# Patient Record
Sex: FEMALE | Race: WHITE | NOT HISPANIC OR LATINO | Employment: PART TIME | URBAN - METROPOLITAN AREA
[De-identification: names, ages, dates, MRNs, and addresses within clinical notes are randomized per-mention and may not be internally consistent; named-entity substitution may affect disease eponyms.]

---

## 2017-04-03 ENCOUNTER — HOSPITAL ENCOUNTER (OUTPATIENT)
Dept: MAMMOGRAPHY | Facility: CLINIC | Age: 62
Discharge: HOME | End: 2017-04-03
Attending: OBSTETRICS & GYNECOLOGY | Admitting: OBSTETRICS & GYNECOLOGY
Payer: COMMERCIAL

## 2017-04-03 DIAGNOSIS — Z12.31 VISIT FOR SCREENING MAMMOGRAM: ICD-10-CM

## 2017-04-03 PROCEDURE — G0202 SCR MAMMO BI INCL CAD: HCPCS

## 2017-05-02 DIAGNOSIS — I10 ESSENTIAL HYPERTENSION: ICD-10-CM

## 2017-05-02 DIAGNOSIS — E03.9 HYPOTHYROIDISM, UNSPECIFIED TYPE: ICD-10-CM

## 2017-05-02 RX ORDER — ATENOLOL 100 MG/1
TABLET ORAL
Qty: 30 TABLET | Refills: 0 | Status: SHIPPED | OUTPATIENT
Start: 2017-05-02 | End: 2017-05-27

## 2017-05-02 RX ORDER — LEVOTHYROXINE SODIUM 75 UG/1
TABLET ORAL
Qty: 30 TABLET | Refills: 0 | Status: SHIPPED | OUTPATIENT
Start: 2017-05-02 | End: 2017-07-06

## 2017-05-02 NOTE — TELEPHONE ENCOUNTER
atenolol (TENORMIN) 100 MG tablet    Last Written Prescription Date: 4/11/16  Last Fill Quantity: 30, # refills: 0    Last Office Visit with [facility], [facility] or [facility] prescribing provider:  6/9/16 with Dr. Boyer  Future Office Visit:    DUE for labs and OV.    BP Readings from Last 3 Encounters:   06/22/16 168/80   06/09/16 138/74   12/03/15 158/78       Levothyroxine   Last Written Prescription Date: 6/9/16  Last Office Visit with [facility] or [facility] prescribing provider:  6/9/16 with Dr. Boyer        TSH   Date Value Ref Range Status   10/21/2016 5.27 (H) 0.40 - 4.00 mU/L Final

## 2017-05-27 ENCOUNTER — MYC REFILL (OUTPATIENT)
Dept: INTERNAL MEDICINE | Facility: CLINIC | Age: 62
End: 2017-05-27

## 2017-05-27 DIAGNOSIS — M10.9 GOUT, UNSPECIFIED: ICD-10-CM

## 2017-05-27 DIAGNOSIS — I10 ESSENTIAL HYPERTENSION: ICD-10-CM

## 2017-05-27 DIAGNOSIS — E78.5 HYPERLIPIDEMIA LDL GOAL <100: ICD-10-CM

## 2017-05-30 RX ORDER — LOVASTATIN 20 MG
20 TABLET ORAL EVERY EVENING
Qty: 30 TABLET | Refills: 0 | Status: SHIPPED | OUTPATIENT
Start: 2017-05-30 | End: 2017-07-06

## 2017-05-30 RX ORDER — ATENOLOL 100 MG/1
100 TABLET ORAL DAILY
Qty: 30 TABLET | Refills: 0 | Status: SHIPPED | OUTPATIENT
Start: 2017-05-30 | End: 2017-07-06

## 2017-05-30 RX ORDER — HYDROCHLOROTHIAZIDE 25 MG/1
25 TABLET ORAL EVERY MORNING
Qty: 30 TABLET | Refills: 0 | Status: SHIPPED | OUTPATIENT
Start: 2017-05-30 | End: 2017-07-06

## 2017-05-30 RX ORDER — LOSARTAN POTASSIUM 100 MG/1
50 TABLET ORAL 2 TIMES DAILY
Qty: 30 TABLET | Refills: 0 | Status: SHIPPED | OUTPATIENT
Start: 2017-05-30 | End: 2017-07-06

## 2017-05-30 RX ORDER — ALLOPURINOL 300 MG/1
300 TABLET ORAL DAILY
Qty: 30 TABLET | Refills: 0 | Status: SHIPPED | OUTPATIENT
Start: 2017-05-30 | End: 2017-07-06

## 2017-05-30 NOTE — TELEPHONE ENCOUNTER
Medication is being filled for 1 time refill only due to:  Due for fasting labs and office visit

## 2017-05-30 NOTE — TELEPHONE ENCOUNTER
Message from MyChart:  Original authorizing provider: MD Jenna Garzon would like a refill of the following medications:  allopurinol (ZYLOPRIM) 300 MG tablet [Wilmar Boyer MD]  lovastatin (MEVACOR) 20 MG tablet [Wilmar Boyer MD]  hydrochlorothiazide (HYDRODIURIL) 25 MG tablet [Wilmar Boyer MD]  losartan (COZAAR) 100 MG tablet [Wilmar Boyer MD]  atenolol (TENORMIN) 100 MG tablet [Wilmar Boyer MD]    Preferred pharmacy: [id number] [id number] [address]    Comment:

## 2017-06-03 DIAGNOSIS — I10 ESSENTIAL HYPERTENSION: ICD-10-CM

## 2017-06-03 DIAGNOSIS — M10.9 GOUT, UNSPECIFIED: ICD-10-CM

## 2017-06-03 DIAGNOSIS — E78.5 HYPERLIPIDEMIA LDL GOAL <100: ICD-10-CM

## 2017-06-05 RX ORDER — ALLOPURINOL 300 MG/1
TABLET ORAL
Qty: 30 TABLET | Refills: 0 | OUTPATIENT
Start: 2017-06-05

## 2017-06-05 RX ORDER — LOSARTAN POTASSIUM 100 MG/1
TABLET ORAL
Qty: 30 TABLET | Refills: 10 | OUTPATIENT
Start: 2017-06-05

## 2017-06-05 RX ORDER — HYDROCHLOROTHIAZIDE 25 MG/1
TABLET ORAL
Qty: 30 TABLET | Refills: 10 | OUTPATIENT
Start: 2017-06-05

## 2017-06-05 RX ORDER — LOVASTATIN 20 MG
TABLET ORAL
Qty: 30 TABLET | Refills: 10 | OUTPATIENT
Start: 2017-06-05

## 2017-06-10 ENCOUNTER — HEALTH MAINTENANCE LETTER (OUTPATIENT)
Age: 62
End: 2017-06-10

## 2017-06-30 DIAGNOSIS — E11.9 TYPE 2 DIABETES MELLITUS WITHOUT COMPLICATION, WITHOUT LONG-TERM CURRENT USE OF INSULIN (H): Primary | ICD-10-CM

## 2017-06-30 NOTE — TELEPHONE ENCOUNTER
metFORMIN (GLUCOPHAGE) 1000 MG tablet         Last Written Prescription Date: 6/09/2016  Last Fill Quantity: 60, # refills: 11  Last Office Visit with [facility], [facility] or  [facility] prescribing provider:  6/22/2016   Next 5 appointments (look out 90 days)     Jul 06, 2017  9:30 AM CDT   PHYSICAL with Wilmar Boyer MD   [facility]    [address] [place] [id number] [phone]                   BP Readings from Last 3 Encounters:   06/22/16 168/80   06/09/16 138/74   12/03/15 158/78     Lab Results   Component Value Date    MICROL 33 06/08/2016     Lab Results   Component Value Date    UMALCR 22.57 06/08/2016     Creatinine   Date Value Ref Range Status   06/08/2016 0.77 0.52 - 1.04 mg/dL Final   ]  GFR Estimate   Date Value Ref Range Status   06/08/2016 75 >60 mL/min/1.7m2 Final     Comment:     Non  GFR Calc   05/01/2015 86 >60 mL/min/1.7m2 Final     Comment:     Non  GFR Calc   09/30/2014 79 >60 mL/min/1.7m2 Final     Comment:     Non  GFR Calc     GFR Estimate If Black   Date Value Ref Range Status   06/08/2016 >90   GFR Calc   >60 mL/min/1.7m2 Final   05/01/2015 >90   GFR Calc   >60 mL/min/1.7m2 Final   09/30/2014 >90   GFR Calc   >60 mL/min/1.7m2 Final     Lab Results   Component Value Date    CHOL 172 06/08/2016     Lab Results   Component Value Date    HDL 37 06/08/2016     Lab Results   Component Value Date    LDL 92 06/08/2016     Lab Results   Component Value Date    TRIG 214 06/08/2016     Lab Results   Component Value Date    CHOLHDLRATIO 4.6 05/01/2015     Lab Results   Component Value Date    AST 66 03/21/2016     Lab Results   Component Value Date    ALT 67 03/21/2016     Lab Results   Component Value Date    A1C 6.5 10/21/2016    A1C 6.9 03/21/2016    A1C 7.5 05/01/2015    A1C 7.4 09/30/2014    A1C 7.8 04/24/2014     Potassium   Date  Value Ref Range Status   06/08/2016 4.8 3.4 - 5.3 mmol/L Final

## 2017-06-30 NOTE — TELEPHONE ENCOUNTER
Routing refill request to provider for review/approval because:  Labs not current:  LIPIDS, micro albumin, BMP, AST/ALT  Patient needs to be seen because it has been more than 1 year since last office visit.  BP out of range per [facility] protocol

## 2017-07-06 ENCOUNTER — OFFICE VISIT (OUTPATIENT)
Dept: INTERNAL MEDICINE | Facility: CLINIC | Age: 62
End: 2017-07-06
Payer: COMMERCIAL

## 2017-07-06 VITALS
DIASTOLIC BLOOD PRESSURE: 80 MMHG | TEMPERATURE: 98 F | BODY MASS INDEX: 29.4 KG/M2 | SYSTOLIC BLOOD PRESSURE: 136 MMHG | HEART RATE: 55 BPM | HEIGHT: 68 IN | WEIGHT: 194 LBS | OXYGEN SATURATION: 98 %

## 2017-07-06 DIAGNOSIS — Z00.01 ENCOUNTER FOR ROUTINE ADULT MEDICAL EXAM WITH ABNORMAL FINDINGS: Primary | ICD-10-CM

## 2017-07-06 DIAGNOSIS — E03.9 HYPOTHYROIDISM, UNSPECIFIED TYPE: ICD-10-CM

## 2017-07-06 DIAGNOSIS — E78.5 HYPERLIPIDEMIA LDL GOAL <100: ICD-10-CM

## 2017-07-06 DIAGNOSIS — E11.9 TYPE 2 DIABETES MELLITUS WITHOUT COMPLICATION, WITHOUT LONG-TERM CURRENT USE OF INSULIN (H): ICD-10-CM

## 2017-07-06 DIAGNOSIS — Z23 NEED FOR TDAP VACCINATION: ICD-10-CM

## 2017-07-06 DIAGNOSIS — I10 ESSENTIAL HYPERTENSION: ICD-10-CM

## 2017-07-06 DIAGNOSIS — M10.9 GOUT, UNSPECIFIED: ICD-10-CM

## 2017-07-06 LAB
ALBUMIN SERPL-MCNC: 3.7 G/DL (ref 3.4–5)
ALP SERPL-CCNC: 61 U/L (ref 40–150)
ALT SERPL W P-5'-P-CCNC: 48 U/L (ref 0–50)
ANION GAP SERPL CALCULATED.3IONS-SCNC: 7 MMOL/L (ref 3–14)
AST SERPL W P-5'-P-CCNC: 52 U/L (ref 0–45)
BILIRUB SERPL-MCNC: 0.4 MG/DL (ref 0.2–1.3)
BUN SERPL-MCNC: 17 MG/DL (ref 7–30)
CALCIUM SERPL-MCNC: 8.9 MG/DL (ref 8.5–10.1)
CHLORIDE SERPL-SCNC: 105 MMOL/L (ref 94–109)
CHOLEST SERPL-MCNC: 172 MG/DL
CO2 SERPL-SCNC: 27 MMOL/L (ref 20–32)
CREAT SERPL-MCNC: 0.79 MG/DL (ref 0.52–1.04)
CREAT UR-MCNC: 83 MG/DL
GFR SERPL CREATININE-BSD FRML MDRD: 73 ML/MIN/1.7M2
GLUCOSE SERPL-MCNC: 118 MG/DL (ref 70–99)
HBA1C MFR BLD: 6.5 % (ref 4.3–6)
HDLC SERPL-MCNC: 41 MG/DL
LDLC SERPL CALC-MCNC: 85 MG/DL
MICROALBUMIN UR-MCNC: 22 MG/L
MICROALBUMIN/CREAT UR: 25.96 MG/G CR (ref 0–25)
NONHDLC SERPL-MCNC: 131 MG/DL
POTASSIUM SERPL-SCNC: 4.5 MMOL/L (ref 3.4–5.3)
PROT SERPL-MCNC: 7.4 G/DL (ref 6.8–8.8)
SODIUM SERPL-SCNC: 139 MMOL/L (ref 133–144)
T4 FREE SERPL-MCNC: 1.14 NG/DL (ref 0.76–1.46)
TRIGL SERPL-MCNC: 230 MG/DL
TSH SERPL DL<=0.005 MIU/L-ACNC: 4.39 MU/L (ref 0.4–4)

## 2017-07-06 PROCEDURE — 99396 PREV VISIT EST AGE 40-64: CPT | Mod: 25 | Performed by: INTERNAL MEDICINE

## 2017-07-06 PROCEDURE — 90715 TDAP VACCINE 7 YRS/> IM: CPT | Performed by: INTERNAL MEDICINE

## 2017-07-06 PROCEDURE — 90471 IMMUNIZATION ADMIN: CPT | Performed by: INTERNAL MEDICINE

## 2017-07-06 PROCEDURE — 84439 ASSAY OF FREE THYROXINE: CPT | Performed by: INTERNAL MEDICINE

## 2017-07-06 PROCEDURE — 84443 ASSAY THYROID STIM HORMONE: CPT | Performed by: INTERNAL MEDICINE

## 2017-07-06 PROCEDURE — 80061 LIPID PANEL: CPT | Performed by: INTERNAL MEDICINE

## 2017-07-06 PROCEDURE — 83036 HEMOGLOBIN GLYCOSYLATED A1C: CPT | Performed by: INTERNAL MEDICINE

## 2017-07-06 PROCEDURE — 80053 COMPREHEN METABOLIC PANEL: CPT | Performed by: INTERNAL MEDICINE

## 2017-07-06 PROCEDURE — 82043 UR ALBUMIN QUANTITATIVE: CPT | Performed by: INTERNAL MEDICINE

## 2017-07-06 PROCEDURE — 36415 COLL VENOUS BLD VENIPUNCTURE: CPT | Performed by: INTERNAL MEDICINE

## 2017-07-06 RX ORDER — ALBUTEROL SULFATE 90 UG/1
2 AEROSOL, METERED RESPIRATORY (INHALATION) EVERY 6 HOURS PRN
Qty: 1 INHALER | Refills: 0 | Status: CANCELLED | OUTPATIENT
Start: 2017-07-06

## 2017-07-06 RX ORDER — ATENOLOL 100 MG/1
100 TABLET ORAL DAILY
Qty: 30 TABLET | Refills: 11 | Status: SHIPPED | OUTPATIENT
Start: 2017-07-06 | End: 2018-07-16

## 2017-07-06 RX ORDER — LOSARTAN POTASSIUM 100 MG/1
50 TABLET ORAL 2 TIMES DAILY
Qty: 30 TABLET | Refills: 11 | Status: SHIPPED | OUTPATIENT
Start: 2017-07-06 | End: 2018-06-30

## 2017-07-06 RX ORDER — LEVOTHYROXINE SODIUM 75 UG/1
TABLET ORAL
Qty: 30 TABLET | Refills: 0
Start: 2017-07-06 | End: 2017-10-04 | Stop reason: DRUGHIGH

## 2017-07-06 RX ORDER — LOVASTATIN 20 MG
20 TABLET ORAL EVERY EVENING
Qty: 30 TABLET | Refills: 11 | Status: SHIPPED | OUTPATIENT
Start: 2017-07-06 | End: 2018-07-03

## 2017-07-06 RX ORDER — HYDROCHLOROTHIAZIDE 25 MG/1
25 TABLET ORAL EVERY MORNING
Qty: 30 TABLET | Refills: 11 | Status: SHIPPED | OUTPATIENT
Start: 2017-07-06 | End: 2018-06-30

## 2017-07-06 RX ORDER — ALLOPURINOL 300 MG/1
300 TABLET ORAL DAILY
Qty: 30 TABLET | Refills: 11 | Status: SHIPPED | OUTPATIENT
Start: 2017-07-06 | End: 2018-06-30

## 2017-07-06 NOTE — Clinical Note
Please abstract: Sees Dr Mcnulty ([facility]) with exam  April 2017. Last pap  done April 2016 and normal per pt

## 2017-07-06 NOTE — NURSING NOTE
"Chief Complaint   Patient presents with     Physical       Initial /80  Pulse 55  Temp 98  F (36.7  C) (Oral)  Ht 5' 7.5\" (1.715 m)  Wt 194 lb (88 kg)  SpO2 98%  BMI 29.94 kg/m2 Estimated body mass index is 29.94 kg/(m^2) as calculated from the following:    Height as of this encounter: 5' 7.5\" (1.715 m).    Weight as of this encounter: 194 lb (88 kg).  Medication Reconciliation: complete  "

## 2017-07-06 NOTE — PROGRESS NOTES
SUBJECTIVE:   CC: Jenna Card is an 61 year old woman who presents for preventive health visit.     Healthy Habits:  Answers for HPI/ROS submitted by the patient on 7/6/2017   Annual Exam:  Getting at least 3 servings of Calcium per day:: Yes  Bi-annual eye exam:: Yes  Dental care twice a year:: Yes  Sleep apnea or symptoms of sleep apnea:: Excessive snoring  Diet:: Diabetic  Frequency of exercise:: 2-3 days/week  Taking medications regularly:: Yes  Medication side effects:: None  Additional concerns today:: No  PHQ-2 Score: 0  Duration of exercise:: 15-30 minutes    Sees Dr Mcnulty ([facility]) with exam  April 2017. Last pap  Done April 2016 and normal per pt  Eye exam Dec 2016        Today's PHQ-2 Score:   PHQ-2 ( 1999 [employer]) 7/6/2017 7/6/2017   Q1: Little interest or pleasure in doing things 0 0   Q2: Feeling down, depressed or hopeless 0 0   PHQ-2 Score 0 0   Q1: Little interest or pleasure in doing things Not at all -   Q2: Feeling down, depressed or hopeless Not at all -   PHQ-2 Score 0 -       Abuse: Current or Past(Physical, Sexual or Emotional)- No  Do you feel safe in your environment - Yes    Social History   Substance Use Topics     Smoking status: Never Smoker     Smokeless tobacco: Not on file     Alcohol use Yes      Comment: 1 drink weekly     The patient does not drink >3 drinks per day nor >7 drinks per week.    Reviewed orders with patient.  Reviewed health maintenance and updated orders accordingly - Yes  Labs reviewed in [facility]    Patient over age 50, mutual decision to screen reflected in health maintenance.    Pertinent mammograms are reviewed under the imaging tab.  History of abnormal Pap smear: NO - age 30-65 PAP every 5 years with negative HPV co-testing recommended    Reviewed and updated as needed this visit by clinical staff  Tobacco  Allergies         Reviewed and updated as needed this visit by Provider            ROS:  C: NEGATIVE for fever, chills, change in weight  I:  "NEGATIVE for worrisome rashes, moles or lesions  E: NEGATIVE for vision changes or irritation. Eye exam Dec 2016.   ENT: NEGATIVE for ear, mouth and throat problems  R: NEGATIVE for significant cough or SOB  B: NEGATIVE for masses, tenderness or discharge  CV: NEGATIVE for chest pain, palpitations or peripheral edema  GI: NEGATIVE for nausea, abdominal pain, heartburn, or change in bowel habits  : NEGATIVE for unusual urinary or vaginal symptoms. No vaginal bleeding.  M: NEGATIVE for significant arthralgias or myalgia that limit activity. Hx osteoarthritis   N: NEGATIVE for weakness, dizziness or paresthesias except for rare CTS sx right side in AM  P: NEGATIVE for changes in mood or affect   E: Hx diabetes mellitus and thyroid treatment. Due for labs      Sugars AM and bedtime with Metformin 500mg BID:  103,129  104,107  89,99  95,102  100,101  105  107    Had previous neg Hep C screening with [facility] in Aug 2014 per pt.  your    OBJECTIVE:   /80  Pulse 55  Temp 98  F (36.7  C) (Oral)  Ht 5' 7.5\" (1.715 m)  Wt 194 lb (88 kg)  SpO2 98%  BMI 29.94 kg/m2  EXAM:  General appearance - healthy, alert, no distress  Skin - No rashes or lesions.  Head - normocephalic, atraumatic  Eyes - GIRMA, EOMI, fundi exam with nondilated pupils negative.  Ears - External ears normal. Canals clear. TM's normal.  Nose/Sinuses - Nares normal. Septum midline. Mucosa normal. No drainage or sinus tenderness.  Oropharynx - No erythema, no adenopathy, no exudates.  Neck - Supple without adenopathy or thyromegaly. No bruits.  Lungs - Clear to auscultation without wheezes/rhonchi.  Heart - Regular rate and rhythm without murmurs, clicks, or gallops.  Nodes - No supraclavicular, axillary, or inguinal adenopathy palpable.  Breasts - deferred  Abdomen - Abdomen soft, non-tender. BS normal. No masses or hepatosplenomegaly palpable. No bruits.  Extremities -No cyanosis, clubbing or edema.    Musculoskeletal - Spine ROM normal. Muscular " strength intact.   Peripheral pulses - radial=4/4, femoral=4/4, posterior tibial=4/4, dorsalis pedis=4/4,  Neuro - Gait normal. Reflexes normal and symmetric. Sensation grossly WNL.  Genital/Rectal - deferred      ASSESSMENT/PLAN:   1. Encounter for routine adult medical exam with abnormal findings    Pap, mammogram through GYN. Needs TDAP vaccine. Will do pneumovax at next appt    2. Type 2 diabetes mellitus without complication, without long-term current use of insulin (H)  Blood sugars at goal. Labs as ordered. Continue mds  - blood glucose monitoring (ACCU-CHEK MARSHALL PLUS) test strip; Check blood sugars once daily  Dispense: 1 Box; Refill: 11  - metFORMIN (GLUCOPHAGE) 500 MG tablet; Take 1 tablet (500 mg) by mouth 2 times daily (with meals)  Dispense: 180 tablet; Refill: 3  - Hemoglobin A1c  - Comprehensive metabolic panel  - Lipid panel reflex to direct LDL  - Microalbumin quantitative random urine  - TSH with free T4 reflex    3. Gout, unspecified  Controlled. Continue medications  - allopurinol (ZYLOPRIM) 300 MG tablet; Take 1 tablet (300 mg) by mouth daily  Dispense: 30 tablet; Refill: 11    4. Hyperlipidemia LDL goal <100  Controlled. Continue medications  - lovastatin (MEVACOR) 20 MG tablet; Take 1 tablet (20 mg) by mouth every evening  Dispense: 30 tablet; Refill: 11  - Comprehensive metabolic panel  - Lipid panel reflex to direct LDL    5. Essential hypertension  Controlled. Continue medications  - hydrochlorothiazide (HYDRODIURIL) 25 MG tablet; Take 1 tablet (25 mg) by mouth every morning  Dispense: 30 tablet; Refill: 11  - losartan (COZAAR) 100 MG tablet; Take 0.5 tablets (50 mg) by mouth 2 times daily  Dispense: 30 tablet; Refill: 11  - atenolol (TENORMIN) 100 MG tablet; Take 1 tablet (100 mg) by mouth daily TAKE 1 TABLET (100 MG) BY MOUTH EVERY EVENING  Dispense: 30 tablet; Refill: 11    6. Hypothyroidism, unspecified type   Lab as ordered to see if room to adjust dose  - levothyroxine  "(SYNTHROID/LEVOTHROID) 75 MCG tablet; 1/2 tab daily  Dispense: 30 tablet; Refill: 0    7. Need for Tdap vaccination  - TDAP VACCINE (ADACEL)      COUNSELING:   Reviewed preventive health counseling, as reflected in patient instructions         reports that she has never smoked. She does not have any smokeless tobacco history on file.    Estimated body mass index is 29.94 kg/(m^2) as calculated from the following:    Height as of this encounter: 5' 7.5\" (1.715 m).    Weight as of this encounter: 194 lb (88 kg).   Weight management plan: Discussed healthy diet and exercise guidelines and patient will follow up in 12 months in clinic to re-evaluate.    Counseling Resources:  ATP IV Guidelines  Pooled Cohorts Equation Calculator  Breast Cancer Risk Calculator  FRAX Risk Assessment  ICSI Preventive Guidelines  Dietary Guidelines for Americans, 2010  USDA's MyPlate  ASA Prophylaxis  Lung CA Screening      PLAN:     TDAP vaccine  Future pneumovax at next appt  Labs as ordered  Continue meds  Will review thyroid status with 37.5mcg dose currently    Wilmar Boyer MD  [facility]  "

## 2017-07-06 NOTE — NURSING NOTE
Screening Questionnaire for Adult Immunization    Are you sick today?   No   Do you have allergies to medications, food, a vaccine component or latex?   No   Have you ever had a serious reaction after receiving a vaccination?   No   Do you have a long-term health problem with heart disease, lung disease, asthma, kidney disease, metabolic disease (e.g. diabetes), anemia, or other blood disorder?   No   Do you have cancer, leukemia, HIV/AIDS, or any other immune system problem?   No   In the past 3 months, have you taken medications that affect  your immune system, such as prednisone, other steroids, or anticancer drugs; drugs for the treatment of rheumatoid arthritis, Crohn s disease, or psoriasis; or have you had radiation treatments?   No   Have you had a seizure, or a brain or other nervous system problem?   No   During the past year, have you received a transfusion of blood or blood     products, or been given immune (gamma) globulin or antiviral drug?   No   For women: Are you pregnant or is there a chance you could become        pregnant during the next month?   No   Have you received any vaccinations in the past 4 weeks?   No     Immunization questionnaire answers were all negative.      MNVFC doesn't apply on this patient    Per orders of Dr. Boyer, injection of TDAP given by Adrianna Camarena. Patient instructed to remain in clinic for 15 minutes afterwards, and to report any adverse reaction to me immediately.Prior to injection verified patient identity using patient's name and date of birth.     Screening performed by Adrianna Camarena on 7/6/2017 at 10:22 AM.  .

## 2017-07-06 NOTE — MR AVS SNAPSHOT
After Visit Summary   7/6/2017    Jenna Card    MRN: [id number]           Patient Information     Date Of Birth          1955        Visit Information        Provider Department      7/6/2017 9:30 AM Wilmar Boyer MD [facility]        Today's Diagnoses     Encounter for routine adult medical exam with abnormal findings    -  1    Type 2 diabetes mellitus without complication, without long-term current use of insulin (H)        Gout, unspecified        Hyperlipidemia LDL goal <100        Essential hypertension        Hypothyroidism, unspecified type        Need for Tdap vaccination          Care Instructions      Preventive Health Recommendations  Female Ages 50 - 64    Yearly exam: See your health care provider every year in order to  o Review health changes.   o Discuss preventive care.    o Review your medicines if your doctor has prescribed any.      Get a Pap test every three years (unless you have an abnormal result and your provider advises testing more often).    If you get Pap tests with HPV test, you only need to test every 5 years, unless you have an abnormal result.     You do not need a Pap test if your uterus was removed (hysterectomy) and you have not had cancer.    You should be tested each year for STDs (sexually transmitted diseases) if you're at risk.     Have a mammogram every 1 to 2 years.    Have a colonoscopy at age 50, or have a yearly FIT test (stool test). These exams screen for colon cancer.      Have a cholesterol test every 5 years, or more often if advised.    Have a diabetes test (fasting glucose) every three years. If you are at risk for diabetes, you should have this test more often.     If you are at risk for osteoporosis (brittle bone disease), think about having a bone density scan (DEXA).    Shots: Get a flu shot each year. Get a tetanus shot every 10 years.    Nutrition:     Eat at least 5 servings of fruits and vegetables each  day.    Eat whole-grain bread, whole-wheat pasta and brown rice instead of white grains and rice.    Talk to your provider about Calcium and Vitamin D.     Lifestyle    Exercise at least 150 minutes a week (30 minutes a day, 5 days a week). This will help you control your weight and prevent disease.    Limit alcohol to one drink per day.    No smoking.     Wear sunscreen to prevent skin cancer.     See your dentist every six months for an exam and cleaning.    See your eye doctor every 1 to 2 years.            Follow-ups after your visit        Who to contact     If you have questions or need follow up information about today's clinic visit or your schedule please contact [facility] directly at [phone].  Normal or non-critical lab and imaging results will be communicated to you by ServiceFramehart, letter or phone within 4 business days after the clinic has received the results. If you do not hear from us within 7 days, please contact the clinic through [employer] or phone. If you have a critical or abnormal lab result, we will notify you by phone as soon as possible.  Submit refill requests through [employer] or call your pharmacy and they will forward the refill request to us. Please allow 3 business days for your refill to be completed.          Additional Information About Your Visit        [employer] Information     [employer] gives you secure access to your electronic health record. If you see a primary care provider, you can also send messages to your care team and make appointments. If you have questions, please call your primary care clinic.  If you do not have a primary care provider, please call [phone] and they will assist you.        Care EveryWhere ID     This is your Care EveryWhere ID. This could be used by other organizations to access your [place] medical records  [id number]        Your Vitals Were     Pulse Temperature Height Pulse Oximetry BMI (Body Mass Index)       55 98  F  "(36.7  C) (Oral) 5' 7.5\" (1.715 m) 98% 29.94 kg/m2        Blood Pressure from Last 3 Encounters:   07/06/17 136/80   06/22/16 168/80   06/09/16 138/74    Weight from Last 3 Encounters:   07/06/17 194 lb (88 kg)   06/22/16 195 lb (88.5 kg)   06/09/16 195 lb (88.5 kg)              We Performed the Following     Comprehensive metabolic panel     Hemoglobin A1c     Lipid panel reflex to direct LDL     Microalbumin quantitative random urine     T4 free     TDAP VACCINE (ADACEL)     TSH with free T4 reflex     VACCINE ADMINISTRATION, INITIAL          Today's Medication Changes          These changes are accurate as of: 7/6/17  9:37 PM.  If you have any questions, ask your nurse or doctor.               These medicines have changed or have updated prescriptions.        Dose/Directions    levothyroxine 75 MCG tablet   Commonly known as:  SYNTHROID/LEVOTHROID   This may have changed:  See the new instructions.   Used for:  Hypothyroidism, unspecified type   Changed by:  Wilmar Boyer MD        1/2 tab daily   Quantity:  30 tablet   Refills:  0       * metFORMIN 1000 MG tablet   Commonly known as:  GLUCOPHAGE   This may have changed:  Another medication with the same name was added. Make sure you understand how and when to take each.   Used for:  Type 2 diabetes mellitus without complication, without long-term current use of insulin (H)   Changed by:  Wilmar Boyer MD        TAKE 1 TABLET (1,000 MG) BY MOUTH 2 TIMES DAILY (WITH MEALS)   Quantity:  60 tablet   Refills:  10       * metFORMIN 500 MG tablet   Commonly known as:  GLUCOPHAGE   This may have changed:  You were already taking a medication with the same name, and this prescription was added. Make sure you understand how and when to take each.   Used for:  Type 2 diabetes mellitus without complication, without long-term current use of insulin (H)   Changed by:  Wilmar Boyer MD        Dose:  500 mg   Take 1 tablet (500 mg) by mouth 2 times daily (with meals) "   Quantity:  180 tablet   Refills:  3       * Notice:  This list has 2 medication(s) that are the same as other medications prescribed for you. Read the directions carefully, and ask your doctor or other care provider to review them with you.         Where to get your medicines      These medications were sent to [id number] - [place], [place] - [address]  [address] [facility] [address]    Hours:  9Am-9Pm (M-F) 9Am-6Pm (S&S) Phone:  [phone]     allopurinol 300 MG tablet    atenolol 100 MG tablet    blood glucose monitoring test strip    hydrochlorothiazide 25 MG tablet    losartan 100 MG tablet    lovastatin 20 MG tablet    metFORMIN 500 MG tablet         Some of these will need a paper prescription and others can be bought over the counter.  Ask your nurse if you have questions.     You don't need a prescription for these medications     levothyroxine 75 MCG tablet                Primary Care Provider Office Phone # Fax #    Wilmar Boyer [phone]       [facility] [address] [place] [address]        Equal Access to Services     BRIANA SON : Hadii ariela ku hadasho Soomaali, waaxda luqadaha, qaybta kaalmada adeegyachar, millicent guerin . So [facility] [phone].    ATENCIÓN: Si habla español, tiene a weiss disposición servicios gratuitos de asistencia lingüística. Evelia al [phone].    We comply with applicable federal civil rights laws and [place] laws. We do not discriminate on the basis of race, color, national origin, age, disability sex, sexual orientation or gender identity.            Thank you!     Thank you for choosing [facility]  for your care. Our goal is always to provide you with excellent care. Hearing back from our patients is one way we can continue to improve our services. Please take a few minutes to complete the written survey that you may receive in the mail after your visit with us.  Thank you!             Your Updated Medication List - Protect others around you: Learn how to safely use, store and throw away your medicines at www.disposemymeds.org.          This list is accurate as of: 7/6/17  9:37 PM.  Always use your most recent med list.                   Brand Name Dispense Instructions for use Diagnosis    albuterol 108 (90 BASE) MCG/ACT Inhaler    PROAIR HFA/PROVENTIL HFA/VENTOLIN HFA    1 Inhaler    Inhale 2 puffs into the lungs every 6 hours as needed for shortness of breath / dyspnea or wheezing    Acute bronchitis with symptoms > 10 days       allopurinol 300 MG tablet    ZYLOPRIM    30 tablet    Take 1 tablet (300 mg) by mouth daily    Gout, unspecified       aspirin 81 MG tablet      Take 81 mg by mouth daily        atenolol 100 MG tablet    TENORMIN    30 tablet    Take 1 tablet (100 mg) by mouth daily TAKE 1 TABLET (100 MG) BY MOUTH EVERY EVENING    Essential hypertension       blood glucose monitoring lancets     1 Box    Use to test blood sugar one time daily or as directed.    Type 2 diabetes, HbA1c goal < 7% (H)       blood glucose monitoring meter device kit           blood glucose monitoring test strip    ACCU-CHEK MARSHALL PLUS    1 Box    Check blood sugars once daily    Type 2 diabetes mellitus without complication, without long-term current use of insulin (H)       CoQ10 200 MG Caps      Take 200 mg by mouth daily        fish oil-omega-3 fatty acids 1000 MG capsule      Take 1 capsule (1 g) by mouth daily    Hyperlipidemia LDL goal <100       fluticasone 110 MCG/ACT Inhaler    FLOVENT HFA    1 Inhaler    Inhale 2 puffs into the lungs 2 times daily    Acute bronchitis with symptoms > 10 days       hydrochlorothiazide 25 MG tablet    HYDRODIURIL    30 tablet    Take 1 tablet (25 mg) by mouth every morning    Essential hypertension       levothyroxine 75 MCG tablet    SYNTHROID/LEVOTHROID    30 tablet    1/2 tab daily    Hypothyroidism, unspecified type       losartan 100 MG  tablet    COZAAR    30 tablet    Take 0.5 tablets (50 mg) by mouth 2 times daily    Essential hypertension       lovastatin 20 MG tablet    MEVACOR    30 tablet    Take 1 tablet (20 mg) by mouth every evening    Hyperlipidemia LDL goal <100       * metFORMIN 1000 MG tablet    GLUCOPHAGE    60 tablet    TAKE 1 TABLET (1,000 MG) BY MOUTH 2 TIMES DAILY (WITH MEALS)    Type 2 diabetes mellitus without complication, without long-term current use of insulin (H)       * metFORMIN 500 MG tablet    GLUCOPHAGE    180 tablet    Take 1 tablet (500 mg) by mouth 2 times daily (with meals)    Type 2 diabetes mellitus without complication, without long-term current use of insulin (H)       triamcinolone 55 MCG/ACT Inhaler    NASACORT AQ    1 Bottle    Spray 2 sprays into both nostrils daily    Post-nasal drainage       * Notice:  This list has 2 medication(s) that are the same as other medications prescribed for you. Read the directions carefully, and ask your doctor or other care provider to review them with you.

## 2017-08-08 ENCOUNTER — TELEPHONE (OUTPATIENT)
Dept: NURSING | Facility: CLINIC | Age: 62
End: 2017-08-08

## 2017-08-08 NOTE — TELEPHONE ENCOUNTER
Pharmacy calling.  Wanting ok to change Atenolol 100mg qd to Atenolol 50mg, 2 per day, due to backorder on 100mg tablets.  RN approved this.

## 2017-10-03 DIAGNOSIS — E03.9 HYPOTHYROIDISM, UNSPECIFIED TYPE: ICD-10-CM

## 2017-10-03 DIAGNOSIS — E11.9 TYPE 2 DIABETES MELLITUS WITHOUT COMPLICATION, WITHOUT LONG-TERM CURRENT USE OF INSULIN (H): Primary | ICD-10-CM

## 2017-10-03 NOTE — TELEPHONE ENCOUNTER
levothyroxine     Last Written Prescription Date: 7/6/17  Last Quantity: 30, # refills: 0  Last Office Visit with G, P or [facility] prescribing provider: 7/6/17        TSH   Date Value Ref Range Status   07/06/2017 4.39 (H) 0.40 - 4.00 mU/L Final

## 2017-10-04 ENCOUNTER — MYC MEDICAL ADVICE (OUTPATIENT)
Dept: URGENT CARE | Facility: URGENT CARE | Age: 62
End: 2017-10-04

## 2017-10-04 DIAGNOSIS — E03.9 HYPOTHYROIDISM, UNSPECIFIED TYPE: Primary | ICD-10-CM

## 2017-10-04 RX ORDER — LEVOTHYROXINE SODIUM 50 UG/1
50 TABLET ORAL DAILY
Qty: 30 TABLET | Refills: 11 | Status: SHIPPED | OUTPATIENT
Start: 2017-10-04 | End: 2018-07-16 | Stop reason: DRUGHIGH

## 2017-10-04 RX ORDER — LEVOTHYROXINE SODIUM 88 UG/1
88 TABLET ORAL DAILY
Qty: 30 TABLET | Refills: 11 | Status: SHIPPED | OUTPATIENT
Start: 2017-10-04 | End: 2017-10-04 | Stop reason: DRUGHIGH

## 2017-10-04 RX ORDER — LEVOTHYROXINE SODIUM 75 UG/1
TABLET ORAL
Qty: 30 TABLET | Refills: 0 | OUTPATIENT
Start: 2017-10-04

## 2017-10-04 NOTE — TELEPHONE ENCOUNTER
Patient stated she got your voice message but she has been taking Levothyroxine 37.5 mcg not 75 mcg.  What dose would you like for patient to take now? Please advise.

## 2017-10-04 NOTE — TELEPHONE ENCOUNTER
MD spoke with [facility] pharmacy. Rx for levothyroxine 88mcg tab cancelled and rx done instead for 50mcg tab, 1 tab daily. Tried to call ptJaelyn WILLINGHAM LM with plan and also sent [facility] message with into

## 2017-10-04 NOTE — TELEPHONE ENCOUNTER
Med dose changed. LM for pt on her telephone line per [facility] and also sent [facility] message. Future labs ordered

## 2018-01-18 ENCOUNTER — MYC REFILL (OUTPATIENT)
Dept: INTERNAL MEDICINE | Facility: CLINIC | Age: 63
End: 2018-01-18

## 2018-01-18 DIAGNOSIS — J20.9 ACUTE BRONCHITIS WITH SYMPTOMS > 10 DAYS: ICD-10-CM

## 2018-01-18 RX ORDER — FLUTICASONE PROPIONATE 110 UG/1
2 AEROSOL, METERED RESPIRATORY (INHALATION) 2 TIMES DAILY
Qty: 1 INHALER | Refills: 1 | Status: CANCELLED | OUTPATIENT
Start: 2018-01-18

## 2018-01-18 NOTE — TELEPHONE ENCOUNTER
Message from [employer]:  Original authorizing provider: KIAH Torrez would like a refill of the following medications:  fluticasone (FLOVENT HFA) 110 MCG/ACT inhaler [Adrianna Neal PA-C]    Preferred pharmacy: [id number] [id number] [address]    Comment:

## 2018-01-22 NOTE — TELEPHONE ENCOUNTER
Routing refill request to provider for review/approval because:  Drug not on the [facility] refill protocol for diagnosis associated

## 2018-01-23 ENCOUNTER — MYC MEDICAL ADVICE (OUTPATIENT)
Dept: INTERNAL MEDICINE | Facility: CLINIC | Age: 63
End: 2018-01-23

## 2018-01-23 RX ORDER — DEXAMETHASONE 4 MG/1
TABLET ORAL
Qty: 1 INHALER | Refills: 0 | OUTPATIENT
Start: 2018-01-23

## 2018-04-23 ENCOUNTER — HOSPITAL ENCOUNTER (OUTPATIENT)
Dept: MAMMOGRAPHY | Facility: CLINIC | Age: 63
Discharge: HOME | End: 2018-04-23
Attending: OBSTETRICS & GYNECOLOGY | Admitting: OBSTETRICS & GYNECOLOGY
Payer: COMMERCIAL

## 2018-04-23 DIAGNOSIS — Z12.31 VISIT FOR SCREENING MAMMOGRAM: ICD-10-CM

## 2018-04-23 PROCEDURE — 77063 BREAST TOMOSYNTHESIS BI: CPT

## 2018-05-18 DIAGNOSIS — E03.9 HYPOTHYROIDISM, UNSPECIFIED TYPE: ICD-10-CM

## 2018-05-18 DIAGNOSIS — E11.9 TYPE 2 DIABETES MELLITUS WITHOUT COMPLICATION, WITHOUT LONG-TERM CURRENT USE OF INSULIN (H): ICD-10-CM

## 2018-05-18 LAB
ALBUMIN SERPL-MCNC: 3.8 G/DL (ref 3.4–5)
ALP SERPL-CCNC: 60 U/L (ref 40–150)
ALT SERPL W P-5'-P-CCNC: 55 U/L (ref 0–50)
ANION GAP SERPL CALCULATED.3IONS-SCNC: 8 MMOL/L (ref 3–14)
AST SERPL W P-5'-P-CCNC: 56 U/L (ref 0–45)
BILIRUB SERPL-MCNC: 0.5 MG/DL (ref 0.2–1.3)
BUN SERPL-MCNC: 17 MG/DL (ref 7–30)
CALCIUM SERPL-MCNC: 9.4 MG/DL (ref 8.5–10.1)
CHLORIDE SERPL-SCNC: 107 MMOL/L (ref 94–109)
CO2 SERPL-SCNC: 26 MMOL/L (ref 20–32)
CREAT SERPL-MCNC: 0.82 MG/DL (ref 0.52–1.04)
GFR SERPL CREATININE-BSD FRML MDRD: 70 ML/MIN/1.7M2
GLUCOSE SERPL-MCNC: 109 MG/DL (ref 70–99)
HBA1C MFR BLD: 6.6 % (ref 0–5.6)
POTASSIUM SERPL-SCNC: 4.3 MMOL/L (ref 3.4–5.3)
PROT SERPL-MCNC: 7.6 G/DL (ref 6.8–8.8)
SODIUM SERPL-SCNC: 141 MMOL/L (ref 133–144)
T4 FREE SERPL-MCNC: 1.18 NG/DL (ref 0.76–1.46)
TSH SERPL DL<=0.005 MIU/L-ACNC: 5.14 MU/L (ref 0.4–4)

## 2018-05-18 PROCEDURE — 36415 COLL VENOUS BLD VENIPUNCTURE: CPT | Performed by: INTERNAL MEDICINE

## 2018-05-18 PROCEDURE — 83036 HEMOGLOBIN GLYCOSYLATED A1C: CPT | Performed by: INTERNAL MEDICINE

## 2018-05-18 PROCEDURE — 80053 COMPREHEN METABOLIC PANEL: CPT | Performed by: INTERNAL MEDICINE

## 2018-05-18 PROCEDURE — 84439 ASSAY OF FREE THYROXINE: CPT | Performed by: INTERNAL MEDICINE

## 2018-05-18 PROCEDURE — 84443 ASSAY THYROID STIM HORMONE: CPT | Performed by: INTERNAL MEDICINE

## 2018-06-30 DIAGNOSIS — I10 ESSENTIAL HYPERTENSION: ICD-10-CM

## 2018-06-30 DIAGNOSIS — M1A.9XX0 CHRONIC GOUT WITHOUT TOPHUS, UNSPECIFIED CAUSE, UNSPECIFIED SITE: Primary | ICD-10-CM

## 2018-07-03 DIAGNOSIS — E78.5 HYPERLIPIDEMIA LDL GOAL <100: ICD-10-CM

## 2018-07-03 RX ORDER — LOSARTAN POTASSIUM 100 MG/1
TABLET ORAL
Qty: 30 TABLET | Refills: 0 | Status: SHIPPED | OUTPATIENT
Start: 2018-07-03 | End: 2018-07-29

## 2018-07-03 RX ORDER — LOVASTATIN 20 MG
20 TABLET ORAL EVERY EVENING
Qty: 30 TABLET | Refills: 0 | Status: SHIPPED | OUTPATIENT
Start: 2018-07-03 | End: 2018-07-29

## 2018-07-03 RX ORDER — HYDROCHLOROTHIAZIDE 25 MG/1
TABLET ORAL
Qty: 30 TABLET | Refills: 0 | Status: SHIPPED | OUTPATIENT
Start: 2018-07-03 | End: 2018-07-29

## 2018-07-03 NOTE — TELEPHONE ENCOUNTER
"Requested Prescriptions   Pending Prescriptions Disp Refills     lovastatin (MEVACOR) 20 MG tablet 30 tablet 11     Sig: Take 1 tablet (20 mg) by mouth every evening    Statins Protocol Passed    7/3/2018  9:48 AM       Passed - LDL on file in past 12 months    Recent Labs   Lab Test  07/06/17   1026   LDL  85            Passed - No abnormal creatine kinase in past 12 months    No lab results found.            Passed - Recent (12 mo) or future (30 days) visit within the authorizing provider's specialty    Patient had office visit in the last 12 months or has a visit in the next 30 days with authorizing provider or within the authorizing provider's specialty.  See \"Patient Info\" tab in inbasket, or \"Choose Columns\" in Meds & Orders section of the refill encounter.           Passed - Patient is age 18 or older       Passed - No active pregnancy on record       Passed - No positive pregnancy test in past 12 months   Last Written Prescription Date:  07/06/2017  Last Fill Quantity: 30,  # refills: 11   Last office visit: 7/6/2017 with prescribing provider:  07/06/2017   Future Office Visit:           "

## 2018-07-03 NOTE — LETTER
[facility]  [address] [id number] [phone]            Jenna Card  [address] [address] [facility] [place] [address]        July 3, 2018    Dear Jenna,    While refilling your prescription today, we noticed that you are due to have labs drawn and see your provider.  We will refill your prescription for 30 days, but a follow-up appointment must be made before any additional refills can be approved.     Taking care of your health is important to us and we look forward to seeing you in the near future.  Please call us at [phone] or [phone] (or use [employer]) to schedule an appointment.     Please disregard this notice if you have already made an appointment.    Sincerely,        [facility]

## 2018-07-03 NOTE — TELEPHONE ENCOUNTER
Routing refill request to provider for review/approval because:  Labs out of range:  ALT    Atenolol Routing refill request to provider for review/approval because:  Drug not active on patient's medication list for dose requested.    Prescription approved per [facility] Refill Protocol.  HCTZ and losartan

## 2018-07-03 NOTE — TELEPHONE ENCOUNTER
"Requested Prescriptions   Pending Prescriptions Disp Refills     allopurinol (ZYLOPRIM) 300 MG tablet [Pharmacy Med Name: Allopurinol Oral Tablet 300 MG] 30 tablet 10     Sig: TAKE ONE TABLET BY MOUTH ONE TIME DAILY    Gout Agents Protocol Failed    7/3/2018 11:37 AM       Failed - CBC on file in past 12 months    Recent Labs   Lab Test  09/30/14   1114   WBC  8.1   RBC  4.81   HGB  14.0   HCT  42.2   PLT  312       For [facility] ONLY: UNAS334 = WBC, IZDH264 = RBC         Failed - Has Uric Acid on file in past 12 months and value is less than 6    Recent Labs   Lab Test  03/21/16   1102   URIC  5.7     If level is 6mg/dL or greater, ok to refill one time and refer to provider.          Passed - ALT on file in past 12 months    Recent Labs   Lab Test  05/18/18   1012   ALT  55*            Passed - Recent (12 mo) or future (30 days) visit within the authorizing provider's specialty    Patient had office visit in the last 12 months or has a visit in the next 30 days with authorizing provider or within the authorizing provider's specialty.  See \"Patient Info\" tab in inbasket, or \"Choose Columns\" in Meds & Orders section of the refill encounter.           Passed - Patient is age 18 or older       Passed - No active pregnancy on record       Passed - Normal serum creatinine on file in the past 12 months    Recent Labs   Lab Test  05/18/18   1012   CR  0.82            Passed - No positive pregnancy test in past year        atenolol (TENORMIN) 50 MG tablet [Pharmacy Med Name: Atenolol Oral Tablet 50 MG] 60 tablet 8     Sig: TAKE TWO TABLETS BY MOUTH DAILY    Beta-Blockers Protocol Passed    7/3/2018 11:37 AM       Passed - Blood pressure under 140/90 in past 12 months    BP Readings from Last 3 Encounters:   07/06/17 136/80   06/22/16 168/80   06/09/16 138/74                Passed - Patient is age 6 or older       Passed - Recent (12 mo) or future (30 days) visit within the authorizing provider's specialty    Patient had office " "visit in the last 12 months or has a visit in the next 30 days with authorizing provider or within the authorizing provider's specialty.  See \"Patient Info\" tab in inbasket, or \"Choose Columns\" in Meds & Orders section of the refill encounter.            hydrochlorothiazide (HYDRODIURIL) 25 MG tablet [Pharmacy Med Name: HydroCHLOROthiazide Oral Tablet 25 MG] 30 tablet 10     Sig: TAKE ONE TABLET BY MOUTH EVERY MORNING    Diuretics (Including Combos) Protocol Passed    7/3/2018 11:37 AM       Passed - Blood pressure under 140/90 in past 12 months    BP Readings from Last 3 Encounters:   07/06/17 136/80   06/22/16 168/80   06/09/16 138/74                Passed - Recent (12 mo) or future (30 days) visit within the authorizing provider's specialty    Patient had office visit in the last 12 months or has a visit in the next 30 days with authorizing provider or within the authorizing provider's specialty.  See \"Patient Info\" tab in inbasket, or \"Choose Columns\" in Meds & Orders section of the refill encounter.           Passed - Patient is age 18 or older       Passed - No active pregancy on record       Passed - Normal serum creatinine on file in past 12 months    Recent Labs   Lab Test  05/18/18   1012   CR  0.82             Passed - Normal serum potassium on file in past 12 months    Recent Labs   Lab Test  05/18/18   1012   POTASSIUM  4.3                   Passed - Normal serum sodium on file in past 12 months    Recent Labs   Lab Test  05/18/18   1012   NA  141             Passed - No positive pregnancy test in past 12 months        losartan (COZAAR) 100 MG tablet [Pharmacy Med Name: Losartan Potassium Oral Tablet 100 MG] 30 tablet 10     Sig: TAKE HALF TABLET BY MOUTH TWICE DAILY    Angiotensin-II Receptors Passed    7/3/2018 11:37 AM       Passed - Blood pressure under 140/90 in past 12 months    BP Readings from Last 3 Encounters:   07/06/17 136/80   06/22/16 168/80   06/09/16 138/74                Passed - Recent " "(12 mo) or future (30 days) visit within the authorizing provider's specialty    Patient had office visit in the last 12 months or has a visit in the next 30 days with authorizing provider or within the authorizing provider's specialty.  See \"Patient Info\" tab in inbasket, or \"Choose Columns\" in Meds & Orders section of the refill encounter.           Passed - Patient is age 18 or older       Passed - No active pregnancy on record       Passed - Normal serum creatinine on file in past 12 months    Recent Labs   Lab Test  05/18/18   1012   CR  0.82            Passed - Normal serum potassium on file in past 12 months    Recent Labs   Lab Test  05/18/18   1012   POTASSIUM  4.3                   Passed - No positive pregnancy test in past 12 months        Last office visit: 7/6/2017 with prescribing provider:     Future Office Visit:   Next 5 appointments (look out 90 days)     Jul 16, 2018 11:30 AM CDT   [facility]YS[facility] with Wilmar Boyer MD   [facility]    [address] [address] [phone]                   "

## 2018-07-03 NOTE — TELEPHONE ENCOUNTER
Patient's yearly appointment is not until 7/16/2018 and she need her medications approved for at least a 1 month supply.

## 2018-07-04 PROBLEM — M1A.9XX0 CHRONIC GOUT WITHOUT TOPHUS, UNSPECIFIED CAUSE, UNSPECIFIED SITE: Status: ACTIVE | Noted: 2018-07-04

## 2018-07-04 RX ORDER — ATENOLOL 50 MG/1
TABLET ORAL
Qty: 60 TABLET | Refills: 11 | Status: SHIPPED | OUTPATIENT
Start: 2018-07-04 | End: 2019-06-19

## 2018-07-04 RX ORDER — ALLOPURINOL 300 MG/1
TABLET ORAL
Qty: 30 TABLET | Refills: 11 | Status: SHIPPED | OUTPATIENT
Start: 2018-07-04 | End: 2019-06-19

## 2018-07-16 ENCOUNTER — TELEPHONE (OUTPATIENT)
Dept: INTERNAL MEDICINE | Facility: CLINIC | Age: 63
End: 2018-07-16

## 2018-07-16 ENCOUNTER — OFFICE VISIT (OUTPATIENT)
Dept: INTERNAL MEDICINE | Facility: CLINIC | Age: 63
End: 2018-07-16
Payer: COMMERCIAL

## 2018-07-16 VITALS
WEIGHT: 192 LBS | DIASTOLIC BLOOD PRESSURE: 78 MMHG | SYSTOLIC BLOOD PRESSURE: 132 MMHG | RESPIRATION RATE: 16 BRPM | BODY MASS INDEX: 29.1 KG/M2 | HEART RATE: 60 BPM | TEMPERATURE: 98.7 F | HEIGHT: 68 IN | OXYGEN SATURATION: 98 %

## 2018-07-16 DIAGNOSIS — Z00.01 ENCOUNTER FOR ROUTINE ADULT MEDICAL EXAM WITH ABNORMAL FINDINGS: Primary | ICD-10-CM

## 2018-07-16 DIAGNOSIS — E03.9 HYPOTHYROIDISM, UNSPECIFIED TYPE: ICD-10-CM

## 2018-07-16 DIAGNOSIS — K75.9 HEPATITIS: ICD-10-CM

## 2018-07-16 DIAGNOSIS — E78.5 HYPERLIPIDEMIA LDL GOAL <100: ICD-10-CM

## 2018-07-16 DIAGNOSIS — I10 ESSENTIAL HYPERTENSION: ICD-10-CM

## 2018-07-16 DIAGNOSIS — R06.83 SNORING: ICD-10-CM

## 2018-07-16 DIAGNOSIS — E11.9 TYPE 2 DIABETES MELLITUS WITHOUT COMPLICATION, WITHOUT LONG-TERM CURRENT USE OF INSULIN (H): ICD-10-CM

## 2018-07-16 PROCEDURE — 99207 C FOOT EXAM  NO CHARGE: CPT | Mod: 25 | Performed by: INTERNAL MEDICINE

## 2018-07-16 PROCEDURE — 99213 OFFICE O/P EST LOW 20 MIN: CPT | Mod: 25 | Performed by: INTERNAL MEDICINE

## 2018-07-16 PROCEDURE — 99396 PREV VISIT EST AGE 40-64: CPT | Performed by: INTERNAL MEDICINE

## 2018-07-16 RX ORDER — LOSARTAN POTASSIUM 100 MG/1
TABLET ORAL
Qty: 30 TABLET | Refills: 0 | Status: CANCELLED | OUTPATIENT
Start: 2018-07-16

## 2018-07-16 RX ORDER — LEVOTHYROXINE SODIUM 125 UG/1
TABLET ORAL
Qty: 30 TABLET | Refills: 11 | Status: SHIPPED | OUTPATIENT
Start: 2018-07-16 | End: 2018-11-29 | Stop reason: DRUGHIGH

## 2018-07-16 RX ORDER — LEVOTHYROXINE SODIUM 125 UG/1
125 TABLET ORAL DAILY
Qty: 30 TABLET | Refills: 11 | Status: SHIPPED | OUTPATIENT
Start: 2018-07-16 | End: 2018-07-16

## 2018-07-16 RX ORDER — LEVOTHYROXINE SODIUM 50 UG/1
50 TABLET ORAL DAILY
Qty: 30 TABLET | Refills: 11 | Status: CANCELLED | OUTPATIENT
Start: 2018-07-16

## 2018-07-16 RX ORDER — LOVASTATIN 20 MG
20 TABLET ORAL EVERY EVENING
Qty: 30 TABLET | Refills: 0 | Status: CANCELLED | OUTPATIENT
Start: 2018-07-16

## 2018-07-16 RX ORDER — HYDROCHLOROTHIAZIDE 25 MG/1
25 TABLET ORAL EVERY MORNING
Qty: 30 TABLET | Refills: 0 | Status: CANCELLED | OUTPATIENT
Start: 2018-07-16

## 2018-07-16 NOTE — PROGRESS NOTES
SUBJECTIVE:   CC: Jenna Card is an 62 year old woman who presents for preventive health visit.     Healthy Habits:  Answers for HPI/ROS submitted by the patient on 7/15/2018   Annual Exam:  Getting at least 3 servings of Calcium per day:: Yes  Bi-annual eye exam:: Yes  Dental care twice a year:: NO  Sleep apnea or symptoms of sleep apnea:: Excessive snoring  Diet:: Regular (no restrictions), Low fat/cholesterol, Diabetic  Frequency of exercise:: 2-3 days/week  Taking medications regularly:: Yes  Medication side effects:: None  PHQ-2 Score: 0  Duration of exercise:: 15-30 minutes          Today's PHQ-2 Score:   PHQ-2 ( 1999 [employer]) 7/15/2018 7/6/2017   Q1: Little interest or pleasure in doing things 0 0   Q2: Feeling down, depressed or hopeless 0 0   PHQ-2 Score 0 0   Q1: Little interest or pleasure in doing things Not at all Not at all   Q2: Feeling down, depressed or hopeless Not at all Not at all   PHQ-2 Score 0 0       Abuse: Current or Past(Physical, Sexual or Emotional)- No  Do you feel safe in your environment - Yes    Social History   Substance Use Topics     Smoking status: Never Smoker     Smokeless tobacco: Not on file     Alcohol use Yes      Comment: 1 drink weekly     If you drink alcohol do you typically have >3 drinks per day or >7 drinks per week? No                     Reviewed orders with patient.  Reviewed health maintenance and updated orders accordingly - Yes  Labs reviewed in [facility]    Patient over age 50, mutual decision to screen reflected in health maintenance.    Pertinent mammograms are reviewed under the imaging tab.  History of abnormal Pap smear: NO - age 30-65 PAP every 5 years with negative HPV co-testing recommended     Reviewed and updated as needed this visit by clinical staff         Reviewed and updated as needed this visit by Provider            Sugars Am and HS:  112,116  119,105  122,105  X,109  117,108  94,119  126    ROS:  CONSTITUTIONAL: NEGATIVE for fever, chills  "Weight down 3 pounds  INTEGUMENTARY/SKIN: NEGATIVE for worrisome rashes, moles or lesions  EYES: NEGATIVE for vision changes or irritation. Due for eye exam  ENT: NEGATIVE for ear, mouth and throat problems  RESP: NEGATIVE for significant cough or SOB. Has snoring. Nap  15 min per day  BREAST: NEGATIVE for masses, tenderness or discharge. Mammogram normal May 2018 at GYN office  CV: NEGATIVE for chest pain, palpitations or peripheral edema. BPs < 130/80 at home. Walking at [facility] pharmacy and  Stairs 1-2 flights. No chest pain or shortness of breath   GI: NEGATIVE for nausea, abdominal pain, heartburn, or change in bowel habits.  : NEGATIVE for unusual urinary or vaginal symptoms. No vaginal bleeding. Has 3rd degree pelvic prolapse. Will be having surgery in October. Will also be having hysterectomy then  MUSCULOSKELETAL:  POSITIVE for osteoarthritis. Occ stiffness.  daily occ  NEURO: NEGATIVE for weakness, dizziness or paresthesias except for occ tingle right 1-3 fingers. Resolves with wrist brace. No sx now  PSYCHIATRIC: NEGATIVE for changes in mood or affect   ENDO: DM controlled. THyroid function too low. Lipids as below    Lab Results   Component Value Date     05/18/2018     Lab Results   Component Value Date    A1C 6.6 05/18/2018     Lab Results   Component Value Date    CHOL 172 07/06/2017     Lab Results   Component Value Date    LDL 85 07/06/2017     Lab Results   Component Value Date    HDL 41 07/06/2017     Lab Results   Component Value Date    TRIG 230 07/06/2017     Lab Results   Component Value Date    CR 0.82 05/18/2018     Lab Results   Component Value Date    ALT 55 05/18/2018     Lab Results   Component Value Date    AST 56 05/18/2018     Lab Results   Component Value Date    MICROL 22 07/06/2017     Lab Results   Component Value Date    TSH 5.14 05/18/2018         OBJECTIVE:   /78  Pulse 60  Temp 98.7  F (37.1  C) (Oral)  Resp 16  Ht 5' 7.5\" (1.715 m)  Wt 192 lb (87.1 kg) "  SpO2 98%  BMI 29.63 kg/m2  EXAM:  General appearance - healthy, alert, no distress  Skin - No rashes or lesions.  Head - normocephalic, atraumatic  Eyes - GIRMA, EOMI, fundi exam with nondilated pupils negative.  Ears - External ears normal. Canals clear. TM's normal.  Nose/Sinuses - Nares normal. Septum midline. Mucosa normal. No drainage or sinus tenderness.  Oropharynx - No erythema, no adenopathy, no exudates. Narrowed airway due to increased weight  Neck - Supple without adenopathy or thyromegaly. No bruits.  Lungs - Clear to auscultation without wheezes/rhonchi.  Heart - Regular rate and rhythm without murmurs, clicks, or gallops.  Nodes - No supraclavicular, axillary, or inguinal adenopathy palpable.  Breasts - deferred  Abdomen - Abdomen soft, non-tender. BS normal. No masses or hepatosplenomegaly palpable. No bruits.  Extremities -No cyanosis, clubbing. Trace BLE ankle nonpitting edema.    Musculoskeletal - Spine ROM normal. Muscular strength intact.   Peripheral pulses - radial=4/4, femoral=4/4, posterior tibial=4/4, dorsalis pedis=4/4,  Neuro - Gait normal. Reflexes normal and symmetric. Sensation grossly WNL.  Genital/Rectal - deferred           ASSESSMENT/PLAN:   1. Encounter for routine adult medical exam with abnormal findings  See plan discussion below. For HCM    2. Type 2 diabetes mellitus without complication, without long-term current use of insulin (H)  Controlled per last lab. Blood sugars at goal.  Repeat labs 6 weeks  - FOOT EXAM  - Hemoglobin A1c; Future  - Comprehensive metabolic panel; Future  - Lipid panel reflex to direct LDL Fasting; Future  - Albumin Random Urine Quantitative with Creat Ratio; Future    3. Hypothyroidism, unspecified type  Thyroid function too low. See plan discussion below for dose change  - levothyroxine (SYNTHROID/LEVOTHROID) 125 MCG tablet; 1/2 tab daily (total 62.5mcg per day)  Dispense: 30 tablet; Refill: 11  - TSH with free T4 reflex; Future  - OFFICE/OUTPT  "VISIT,EST,LEVL III    4. Essential hypertension  controlled    5. Hyperlipidemia LDL goal <100  Repeat fasting labs 6 weeks when thyroid function more normal. Previous non-HDL and trigs elevated. Continue Lovastatin for now. If elevated LFTS persist, will consider med change to see if causing vs other  - Comprehensive metabolic panel; Future  - Lipid panel reflex to direct LDL Fasting; Future    6. Snoring   Likely BIJU. Pt to see sleep clinic  - SLEEP EVALUATION & MANAGEMENT REFERRAL - ADULT -[facility] [place]  [phone] (Age 18 and up); Future    7. Hepatitis  Labs as ordwered. If still elevated, WIll hold statin and check RUQ U/S to r/o steatohepatitis  - Comprehensive metabolic panel; Future  - Erythrocyte sedimentation rate auto; Future  - Iron and iron binding capacity; Future      COUNSELING:   Reviewed preventive health counseling, as reflected in patient instructions  Special attention given to:        review of abnormal thyroid function       Regular exercise       Healthy diet/nutrition    BP Readings from Last 1 Encounters:   07/06/17 136/80     Estimated body mass index is 29.94 kg/(m^2) as calculated from the following:    Height as of 7/6/17: 5' 7.5\" (1.715 m).    Weight as of 7/6/17: 194 lb (88 kg).      Weight management plan: Discussed healthy diet and exercise guidelines and patient will follow up in 6 months in clinic to re-evaluate.     reports that she has never smoked. She does not have any smokeless tobacco history on file.      Counseling Resources:  ATP IV Guidelines  Pooled Cohorts Equation Calculator  Breast Cancer Risk Calculator  FRAX Risk Assessment  ICSI Preventive Guidelines  Dietary Guidelines for Americans, 2010  USDA's MyPlate  ASA Prophylaxis  Lung CA Screening      PLAN:   Stop Levothyroxine 50mcg tab  Start Levothyroxine 125mcg tab, 1/2 tab daily in AM for thyroid   Fasting blood and urine lab in 6 weeks  Referral to  Sleep Clinic - [place]. Call for " appt  Colonoscopy in 1 year   Check with insurance or speak with your pharmacist re: Shingrix vaccine coverage for shingles prevention.  This is a 2 shot series done 2-6 months apart  Prevnar -13  vaccine this Fall (October) when seen back for future preop  Eye exam  Pt was informed regarding extra E&M billing for medical issues not related to preventative physical                Wilmar Boyer MD  [facility]

## 2018-07-16 NOTE — MR AVS SNAPSHOT
After Visit Summary   7/16/2018    Jenna Card    MRN: [id number]           Patient Information     Date Of Birth          1955        Visit Information        Provider Department      7/16/2018 11:30 AM Wilmar Boyer MD [facility]        Today's Diagnoses     Snoring    -  1    Screening for HIV (human immunodeficiency virus)        Screening for diabetic retinopathy        Screening for diabetic peripheral neuropathy        Essential hypertension        Hypothyroidism, unspecified type        Hyperlipidemia LDL goal <100        Type 2 diabetes mellitus without complication, without long-term current use of insulin (H)          Care Instructions     Stop Levothyroxine 50mcg tab  Start Levothyroxine 125mcg tab, 1/2 tab daily in AM for thyroid   Fasting blood and urine lab in 6 weeks  Referral to  [facility]. Call for appt  Colonoscopy in 1 year   Check with insurance or speak with your pharmacist re: Shingrix vaccine coverage for shingles prevention.  This is a 2 shot series done 2-6 months apart  Prevnar -13  vaccine this Fall (October)  Eye exam  Pt was informed regarding extra E&M billing for medical issues not related to preventative physical           Follow-ups after your visit        Additional Services     SLEEP EVALUATION & MANAGEMENT REFERRAL - ADULT -[facility]  [phone] (Age 18 and up)       Please be aware that coverage of these services is subject to the terms and limitations of your health insurance plan.  Call member services at your health plan with any benefit or coverage questions.      Please bring the following to your appointment:    >>   List of current medications   >>   This referral request   >>   Any documents/labs given to you for this referral                      Future tests that were ordered for you today     Open Future Orders        Priority Expected Expires Ordered    SLEEP EVALUATION &  "MANAGEMENT REFERRAL - ADULT -[facility]  [phone] (Age 18 and up) Routine  7/16/2019 7/16/2018            Who to contact     If you have questions or need follow up information about today's clinic visit or your schedule please contact [facility] directly at [phone].  Normal or non-critical lab and imaging results will be communicated to you by MyChart, letter or phone within 4 business days after the clinic has received the results. If you do not hear from us within 7 days, please contact the clinic through Calmhart or phone. If you have a critical or abnormal lab result, we will notify you by phone as soon as possible.  Submit refill requests through LaunchHear or call your pharmacy and they will forward the refill request to us. Please allow 3 business days for your refill to be completed.          Additional Information About Your Visit        [employer] Information     [employer] gives you secure access to your electronic health record. If you see a primary care provider, you can also send messages to your care team and make appointments. If you have questions, please call your primary care clinic.  If you do not have a primary care provider, please call [phone] and they will assist you.        Care EveryWhere ID     This is your Care EveryWhere ID. This could be used by other organizations to access your [facility] medical records  [id number]        Your Vitals Were     Pulse Temperature Respirations Height Pulse Oximetry BMI (Body Mass Index)    60 98.7  F (37.1  C) (Oral) 16 5' 7.5\" (1.715 m) 98% 29.63 kg/m2       Blood Pressure from Last 3 Encounters:   07/16/18 138/80   07/06/17 136/80   06/22/16 168/80    Weight from Last 3 Encounters:   07/16/18 192 lb (87.1 kg)   07/06/17 194 lb (88 kg)   06/22/16 195 lb (88.5 kg)                 Today's Medication Changes          These changes are accurate as of 7/16/18 12:21 PM.  If you have any questions, ask " your nurse or doctor.               These medicines have changed or have updated prescriptions.        Dose/Directions    levothyroxine 125 MCG tablet   Commonly known as:  SYNTHROID/LEVOTHROID   This may have changed:    - medication strength  - how much to take  - additional instructions   Used for:  Hypothyroidism, unspecified type   Changed by:  Wilmar Boyer MD        Dose:  125 mcg   Take 1 tablet (125 mcg) by mouth daily 1/2 tab daily   Quantity:  30 tablet   Refills:  11            Where to get your medicines      These medications were sent to [id number] - [place], [place] - [address]  [address] [facility] [address]    Hours:  9Am-9Pm (M-F) 9Am-6Pm (S&S) Phone:  [phone]     levothyroxine 125 MCG tablet                Primary Care Provider Office Phone # Fax #    Wilmar Boyer [phone]       [address] [facility] [address]        Equal Access to Services     BRIANA [facility] HospitalJUNE : Hadii ariela de anda hadasho Soomaali, waaxda luqadaha, qaybta kaalmada adeegyada, waxay robertin hayneo guerin . So [facility] [phone].    ATENCIÓN: Si rossana watts, tiene a weiss disposición servicios gratuitos de asistencia lingüística. Llame al [phone].    We comply with applicable federal civil rights laws and [place] laws. We do not discriminate on the basis of race, color, national origin, age, disability, sex, sexual orientation, or gender identity.            Thank you!     Thank you for choosing [facility]  for your care. Our goal is always to provide you with excellent care. Hearing back from our patients is one way we can continue to improve our services. Please take a few minutes to complete the written survey that you may receive in the mail after your visit with us. Thank you!             Your Updated Medication List - Protect others around you: Learn how to safely use, store and throw away your medicines at www.disposemymeds.org.           This list is accurate as of 7/16/18 12:21 PM.  Always use your most recent med list.                   Brand Name Dispense Instructions for use Diagnosis    albuterol 108 (90 Base) MCG/ACT Inhaler    PROAIR HFA/PROVENTIL HFA/VENTOLIN HFA    1 Inhaler    Inhale 2 puffs into the lungs every 6 hours as needed for shortness of breath / dyspnea or wheezing    Acute bronchitis with symptoms > 10 days       allopurinol 300 MG tablet    ZYLOPRIM    30 tablet    TAKE ONE TABLET BY MOUTH ONE TIME DAILY    Chronic gout without tophus, unspecified cause, unspecified site       aspirin 81 MG tablet      Take 81 mg by mouth daily        atenolol 50 MG tablet    TENORMIN    60 tablet    TAKE TWO TABLETS BY MOUTH DAILY    Essential hypertension       blood glucose monitoring lancets     1 Box    Use to test blood sugar one time daily or as directed.    Type 2 diabetes, HbA1c goal < 7% (H)       blood glucose monitoring meter device kit           blood glucose monitoring test strip    ACCU-CHEK MARSHALL PLUS    1 Box    Check blood sugars once daily    Type 2 diabetes mellitus without complication, without long-term current use of insulin (H)       fish oil-omega-3 fatty acids 1000 MG capsule      Take 1 capsule (1 g) by mouth daily    Hyperlipidemia LDL goal <100       fluticasone 110 MCG/ACT Inhaler    FLOVENT HFA    1 Inhaler    Inhale 2 puffs into the lungs 2 times daily    Acute bronchitis with symptoms > 10 days       hydrochlorothiazide 25 MG tablet    HYDRODIURIL    30 tablet    TAKE ONE TABLET BY MOUTH EVERY MORNING    Essential hypertension       levothyroxine 125 MCG tablet    SYNTHROID/LEVOTHROID    30 tablet    Take 1 tablet (125 mcg) by mouth daily 1/2 tab daily    Hypothyroidism, unspecified type       losartan 100 MG tablet    COZAAR    30 tablet    TAKE HALF TABLET BY MOUTH TWICE DAILY    Essential hypertension       lovastatin 20 MG tablet    MEVACOR    30 tablet    Take 1 tablet (20 mg) by mouth every evening     Hyperlipidemia LDL goal <100       metFORMIN 500 MG tablet    GLUCOPHAGE    180 tablet    Take 1 tablet (500 mg) by mouth 2 times daily (with meals)    Type 2 diabetes mellitus without complication, without long-term current use of insulin (H)       triamcinolone 55 MCG/ACT Inhaler    NASACORT AQ    1 Bottle    Spray 2 sprays into both nostrils daily    Post-nasal drainage

## 2018-07-16 NOTE — PATIENT INSTRUCTIONS
Stop Levothyroxine 50mcg tab  Start Levothyroxine 125mcg tab, 1/2 tab daily in AM for thyroid   Fasting blood and urine lab in 6 weeks  Referral to  [facility]. Call for appt  Colonoscopy in 1 year   Check with insurance or speak with your pharmacist re: Shingrix vaccine coverage for shingles prevention.  This is a 2 shot series done 2-6 months apart  Prevnar -13  vaccine this Fall (October)  Eye exam  Pt was informed regarding extra E&M billing for medical issues not related to preventative physical

## 2018-07-16 NOTE — TELEPHONE ENCOUNTER
"Pharmacist needs clarification on levothyroxine (SYNTHROID/LEVOTHROID) 125 MCG tablet sent today. Sig says \"Take 1 tablet (125 mcg) by mouth daily 1/2 tab daily - Oral\". Please clarify. What dose is patient to take? 1 tab or 1/2 tab?   "

## 2018-07-29 DIAGNOSIS — E78.5 HYPERLIPIDEMIA LDL GOAL <100: ICD-10-CM

## 2018-07-29 DIAGNOSIS — I10 ESSENTIAL HYPERTENSION: ICD-10-CM

## 2018-07-29 DIAGNOSIS — E11.9 TYPE 2 DIABETES MELLITUS WITHOUT COMPLICATION, WITHOUT LONG-TERM CURRENT USE OF INSULIN (H): ICD-10-CM

## 2018-07-29 NOTE — LETTER
[facility]  [address] [id number] [phone]            Jenna Card  [address] [facility] [address]        July 31, 2018    Dear Jenna,    While refilling your prescription today, we noticed that you are due to have labs drawn.  We will refill your prescription for 30 days, but a follow-up appointment must be made before any additional refills can be approved.     Taking care of your health is important to us and we look forward to seeing you in the near future.  Please call us at [phone] or [phone] (or use RegenaStem) to schedule an appointment.     Please disregard this notice if you have already made an appointment.    Sincerely,        [facility]

## 2018-07-31 RX ORDER — LOVASTATIN 20 MG
20 TABLET ORAL AT BEDTIME
Qty: 30 TABLET | Refills: 0 | Status: SHIPPED | OUTPATIENT
Start: 2018-07-31 | End: 2018-08-29

## 2018-07-31 RX ORDER — LOSARTAN POTASSIUM 100 MG/1
TABLET ORAL
Qty: 90 TABLET | Refills: 1 | Status: SHIPPED | OUTPATIENT
Start: 2018-07-31 | End: 2019-01-28

## 2018-07-31 RX ORDER — HYDROCHLOROTHIAZIDE 25 MG/1
TABLET ORAL
Qty: 90 TABLET | Refills: 1 | Status: ON HOLD | OUTPATIENT
Start: 2018-07-31 | End: 2019-01-24

## 2018-07-31 NOTE — TELEPHONE ENCOUNTER
"Requested Prescriptions   Pending Prescriptions Disp Refills     hydrochlorothiazide (HYDRODIURIL) 25 MG tablet [Pharmacy Med Name: HydroCHLOROthiazide Oral Tablet 25 MG] 30 tablet 0     Sig: TAKE ONE TABLET BY MOUTH IN THE MORNING    Diuretics (Including Combos) Protocol Passed    7/29/2018  7:00 AM       Passed - Blood pressure under 140/90 in past 12 months    BP Readings from Last 3 Encounters:   07/16/18 132/78   07/06/17 136/80   06/22/16 168/80     Requested Prescriptions   Pending Prescriptions Disp Refills     hydrochlorothiazide (HYDRODIURIL) 25 MG tablet [Pharmacy Med Name: HydroCHLOROthiazide Oral Tablet 25 MG] 30 tablet 0     Sig: TAKE ONE TABLET BY MOUTH IN THE MORNING    Diuretics (Including Combos) Protocol Passed    7/29/2018  7:00 AM       Passed - Blood pressure under 140/90 in past 12 months    BP Readings from Last 3 Encounters:   07/16/18 132/78   07/06/17 136/80   06/22/16 168/80                Passed - Recent (12 mo) or future (30 days) visit within the authorizing provider's specialty    Patient had office visit in the last 12 months or has a visit in the next 30 days with authorizing provider or within the authorizing provider's specialty.  See \"Patient Info\" tab in inbasket, or \"Choose Columns\" in Meds & Orders section of the refill encounter.           Passed - Patient is age 18 or older       Passed - No active pregancy on record       Passed - Normal serum creatinine on file in past 12 months    Recent Labs   Lab Test  05/18/18   1012   CR  0.82             Passed - Normal serum potassium on file in past 12 months    Recent Labs   Lab Test  05/18/18   1012   POTASSIUM  4.3                   Passed - Normal serum sodium on file in past 12 months    Recent Labs   Lab Test  05/18/18   1012   NA  141             Passed - No positive pregnancy test in past 12 months        losartan (COZAAR) 100 MG tablet [Pharmacy Med Name: Losartan Potassium Oral Tablet 100 MG] 30 tablet 0     Sig: TAKE " "HALF TABLET BY MOUTH TWICE DAILY    Angiotensin-II Receptors Passed    7/29/2018  7:00 AM       Passed - Blood pressure under 140/90 in past 12 months    BP Readings from Last 3 Encounters:   07/16/18 132/78   07/06/17 136/80   06/22/16 168/80                Passed - Recent (12 mo) or future (30 days) visit within the authorizing provider's specialty    Patient had office visit in the last 12 months or has a visit in the next 30 days with authorizing provider or within the authorizing provider's specialty.  See \"Patient Info\" tab in inbasket, or \"Choose Columns\" in Meds & Orders section of the refill encounter.           Passed - Patient is age 18 or older       Passed - No active pregnancy on record       Passed - Normal serum creatinine on file in past 12 months    Recent Labs   Lab Test  05/18/18   1012   CR  0.82            Passed - Normal serum potassium on file in past 12 months    Recent Labs   Lab Test  05/18/18   1012   POTASSIUM  4.3                   Passed - No positive pregnancy test in past 12 months        metFORMIN (GLUCOPHAGE) 500 MG tablet [Pharmacy Med Name: MetFORMIN HCl Oral Tablet 500 MG] 60 tablet 2     Sig: TAKE ONE TABLET BY MOUTH TWICE DAILY WITH MEALS    Biguanide Agents Failed    7/29/2018  7:00 AM       Failed - Patient has documented LDL within the past 12 mos.    Recent Labs   Lab Test  07/06/17   1026   LDL  85            Passed - Blood pressure less than 140/90 in past 6 months    BP Readings from Last 3 Encounters:   07/16/18 132/78   07/06/17 136/80   06/22/16 168/80                Passed - Patient has had a Microalbumin in the past 12 mos.    Recent Labs   Lab Test  07/06/17   1148   MICROL  22   UMALCR  25.96*            Passed - Patient is age 10 or older       Passed - Patient has documented A1c within the specified period of time.    If HgbA1C is 8 or greater, it needs to be on file within the past 3 months.  If less than 8, must be on file within the past 6 months.     Recent " "Labs   Lab Test  05/18/18   1012   A1C  6.6*            Passed - Patient's CR is NOT>1.4 OR Patient's EGFR is NOT<45 within past 12 mos.    Recent Labs   Lab Test  05/18/18   1012   GFRESTIMATED  70   GFRESTBLACK  85       Recent Labs   Lab Test  05/18/18   1012   CR  0.82            Passed - Patient does NOT have a diagnosis of CHF.       Passed - Patient is not pregnant       Passed - Patient has not had a positive pregnancy test within the past 12 mos.        Passed - Recent (6 mo) or future (30 days) visit within the authorizing provider's specialty    Patient had office visit in the last 6 months or has a visit in the next 30 days with authorizing provider or within the authorizing provider's specialty.  See \"Patient Info\" tab in inbasket, or \"Choose Columns\" in Meds & Orders section of the refill encounter.            lovastatin (MEVACOR) 20 MG tablet [Pharmacy Med Name: Lovastatin Oral Tablet 20 MG] 30 tablet 0     Sig: Take 1 tablet (20 mg) by mouth every evening. Need appointment and labs for further refills.    Statins Protocol Failed    7/29/2018  7:00 AM       Failed - LDL on file in past 12 months    Recent Labs   Lab Test  07/06/17   1026   LDL  85            Passed - No abnormal creatine kinase in past 12 months    No lab results found.            Passed - Recent (12 mo) or future (30 days) visit within the authorizing provider's specialty    Patient had office visit in the last 12 months or has a visit in the next 30 days with authorizing provider or within the authorizing provider's specialty.  See \"Patient Info\" tab in inSureBookset, or \"Choose Columns\" in Meds & Orders section of the refill encounter.           Passed - Patient is age 18 or older       Passed - No active pregnancy on record       Passed - No positive pregnancy test in past 12 months        Last Written Prescription Date:  7/3/18  Last Fill Quantity: 30,  # refills: 0   Last office visit: 7/16/2018 with prescribing provider:  7/16/18 " "  Future Office Visit:               Passed - Recent (12 mo) or future (30 days) visit within the authorizing provider's specialty    Patient had office visit in the last 12 months or has a visit in the next 30 days with authorizing provider or within the authorizing provider's specialty.  See \"Patient Info\" tab in inbasket, or \"Choose Columns\" in Meds & Orders section of the refill encounter.           Passed - Patient is age 18 or older       Passed - No active pregancy on record       Passed - Normal serum creatinine on file in past 12 months    Recent Labs   Lab Test  05/18/18   1012   CR  0.82             Passed - Normal serum potassium on file in past 12 months    Recent Labs   Lab Test  05/18/18   1012   POTASSIUM  4.3                   Passed - Normal serum sodium on file in past 12 months    Recent Labs   Lab Test  05/18/18   1012   NA  141             Passed - No positive pregnancy test in past 12 months        losartan (COZAAR) 100 MG tablet [Pharmacy Med Name: Losartan Potassium Oral Tablet 100 MG] 30 tablet 0     Sig: TAKE HALF TABLET BY MOUTH TWICE DAILY    Angiotensin-II Receptors Passed    7/29/2018  7:00 AM       Passed - Blood pressure under 140/90 in past 12 months    BP Readings from Last 3 Encounters:   07/16/18 132/78   07/06/17 136/80   06/22/16 168/80                Passed - Recent (12 mo) or future (30 days) visit within the authorizing provider's specialty    Patient had office visit in the last 12 months or has a visit in the next 30 days with authorizing provider or within the authorizing provider's specialty.  See \"Patient Info\" tab in inbasket, or \"Choose Columns\" in Meds & Orders section of the refill encounter.           Passed - Patient is age 18 or older       Passed - No active pregnancy on record       Passed - Normal serum creatinine on file in past 12 months    Recent Labs   Lab Test  05/18/18   1012   CR  0.82            Passed - Normal serum potassium on file in past 12 months " "   Recent Labs   Lab Test  05/18/18   1012   POTASSIUM  4.3                   Passed - No positive pregnancy test in past 12 months        metFORMIN (GLUCOPHAGE) 500 MG tablet [Pharmacy Med Name: MetFORMIN HCl Oral Tablet 500 MG] 60 tablet 2     Sig: TAKE ONE TABLET BY MOUTH TWICE DAILY WITH MEALS    Biguanide Agents Failed    7/29/2018  7:00 AM       Failed - Patient has documented LDL within the past 12 mos.    Recent Labs   Lab Test  07/06/17   1026   LDL  85            Passed - Blood pressure less than 140/90 in past 6 months    BP Readings from Last 3 Encounters:   07/16/18 132/78   07/06/17 136/80   06/22/16 168/80                Passed - Patient has had a Microalbumin in the past 12 mos.    Recent Labs   Lab Test  07/06/17   1148   MICROL  22   UMALCR  25.96*            Passed - Patient is age 10 or older       Passed - Patient has documented A1c within the specified period of time.    If HgbA1C is 8 or greater, it needs to be on file within the past 3 months.  If less than 8, must be on file within the past 6 months.     Recent Labs   Lab Test  05/18/18   1012   A1C  6.6*            Passed - Patient's CR is NOT>1.4 OR Patient's EGFR is NOT<45 within past 12 mos.    Recent Labs   Lab Test  05/18/18   1012   GFRESTIMATED  70   GFRESTBLACK  85       Recent Labs   Lab Test  05/18/18   1012   CR  0.82            Passed - Patient does NOT have a diagnosis of CHF.       Passed - Patient is not pregnant       Passed - Patient has not had a positive pregnancy test within the past 12 mos.        Passed - Recent (6 mo) or future (30 days) visit within the authorizing provider's specialty    Patient had office visit in the last 6 months or has a visit in the next 30 days with authorizing provider or within the authorizing provider's specialty.  See \"Patient Info\" tab in inbasket, or \"Choose Columns\" in Meds & Orders section of the refill encounter.            lovastatin (MEVACOR) 20 MG tablet [Pharmacy Med Name: " "Lovastatin Oral Tablet 20 MG] 30 tablet 0     Sig: Take 1 tablet (20 mg) by mouth every evening. Need appointment and labs for further refills.    Statins Protocol Failed    7/29/2018  7:00 AM       Failed - LDL on file in past 12 months    Recent Labs   Lab Test  07/06/17   1026   LDL  85            Passed - No abnormal creatine kinase in past 12 months    No lab results found.            Passed - Recent (12 mo) or future (30 days) visit within the authorizing provider's specialty    Patient had office visit in the last 12 months or has a visit in the next 30 days with authorizing provider or within the authorizing provider's specialty.  See \"Patient Info\" tab in inbasket, or \"Choose Columns\" in Meds & Orders section of the refill encounter.           Passed - Patient is age 18 or older       Passed - No active pregnancy on record       Passed - No positive pregnancy test in past 12 months        Last Written Prescription Date:  7/3/18  Last Fill Quantity: 30,  # refills: 0   Last office visit: 7/16/2018 with prescribing provider:  7/16/18   Future Office Visit:      "

## 2018-07-31 NOTE — TELEPHONE ENCOUNTER
"Requested Prescriptions   Pending Prescriptions Disp Refills     hydrochlorothiazide (HYDRODIURIL) 25 MG tablet [Pharmacy Med Name: HydroCHLOROthiazide Oral Tablet 25 MG] 30 tablet 0     Sig: TAKE ONE TABLET BY MOUTH IN THE MORNING    Diuretics (Including Combos) Protocol Passed    7/31/2018 11:36 AM       Passed - Blood pressure under 140/90 in past 12 months    BP Readings from Last 3 Encounters:   07/16/18 132/78   07/06/17 136/80   06/22/16 168/80                Passed - Recent (12 mo) or future (30 days) visit within the authorizing provider's specialty    Patient had office visit in the last 12 months or has a visit in the next 30 days with authorizing provider or within the authorizing provider's specialty.  See \"Patient Info\" tab in inbasket, or \"Choose Columns\" in Meds & Orders section of the refill encounter.           Passed - Patient is age 18 or older       Passed - No active pregancy on record       Passed - Normal serum creatinine on file in past 12 months    Recent Labs   Lab Test  05/18/18   1012   CR  0.82             Passed - Normal serum potassium on file in past 12 months    Recent Labs   Lab Test  05/18/18   1012   POTASSIUM  4.3                   Passed - Normal serum sodium on file in past 12 months    Recent Labs   Lab Test  05/18/18   1012   NA  141             Passed - No positive pregnancy test in past 12 months        losartan (COZAAR) 100 MG tablet [Pharmacy Med Name: Losartan Potassium Oral Tablet 100 MG] 30 tablet 0     Sig: TAKE HALF TABLET BY MOUTH TWICE DAILY    Angiotensin-II Receptors Passed    7/31/2018 11:36 AM       Passed - Blood pressure under 140/90 in past 12 months    BP Readings from Last 3 Encounters:   07/16/18 132/78   07/06/17 136/80   06/22/16 168/80                Passed - Recent (12 mo) or future (30 days) visit within the authorizing provider's specialty    Patient had office visit in the last 12 months or has a visit in the next 30 days with authorizing provider " "or within the authorizing provider's specialty.  See \"Patient Info\" tab in inbasket, or \"Choose Columns\" in Meds & Orders section of the refill encounter.           Passed - Patient is age 18 or older       Passed - No active pregnancy on record       Passed - Normal serum creatinine on file in past 12 months    Recent Labs   Lab Test  05/18/18   1012   CR  0.82            Passed - Normal serum potassium on file in past 12 months    Recent Labs   Lab Test  05/18/18   1012   POTASSIUM  4.3                   Passed - No positive pregnancy test in past 12 months        metFORMIN (GLUCOPHAGE) 500 MG tablet [Pharmacy Med Name: MetFORMIN HCl Oral Tablet 500 MG] 60 tablet 2     Sig: TAKE ONE TABLET BY MOUTH TWICE DAILY WITH MEALS    Biguanide Agents Failed    7/31/2018 11:36 AM       Failed - Patient has documented LDL within the past 12 mos.    Recent Labs   Lab Test  07/06/17   1026   LDL  85            Passed - Blood pressure less than 140/90 in past 6 months    BP Readings from Last 3 Encounters:   07/16/18 132/78   07/06/17 136/80   06/22/16 168/80                Passed - Patient has had a Microalbumin in the past 12 mos.    Recent Labs   Lab Test  07/06/17   1148   MICROL  22   UMALCR  25.96*            Passed - Patient is age 10 or older       Passed - Patient has documented A1c within the specified period of time.    If HgbA1C is 8 or greater, it needs to be on file within the past 3 months.  If less than 8, must be on file within the past 6 months.     Recent Labs   Lab Test  05/18/18   1012   A1C  6.6*            Passed - Patient's CR is NOT>1.4 OR Patient's EGFR is NOT<45 within past 12 mos.    Recent Labs   Lab Test  05/18/18   1012   GFRESTIMATED  70   GFRESTBLACK  85       Recent Labs   Lab Test  05/18/18   1012   CR  0.82            Passed - Patient does NOT have a diagnosis of CHF.       Passed - Patient is not pregnant       Passed - Patient has not had a positive pregnancy test within the past 12 mos.     " "   Passed - Recent (6 mo) or future (30 days) visit within the authorizing provider's specialty    Patient had office visit in the last 6 months or has a visit in the next 30 days with authorizing provider or within the authorizing provider's specialty.  See \"Patient Info\" tab in inbasket, or \"Choose Columns\" in Meds & Orders section of the refill encounter.            lovastatin (MEVACOR) 20 MG tablet [Pharmacy Med Name: Lovastatin Oral Tablet 20 MG] 30 tablet 0     Sig: Take 1 tablet (20 mg) by mouth every evening. Need appointment and labs for further refills.    Statins Protocol Failed    7/31/2018 11:36 AM       Failed - LDL on file in past 12 months    Recent Labs   Lab Test  07/06/17   1026   LDL  85            Passed - No abnormal creatine kinase in past 12 months    No lab results found.            Passed - Recent (12 mo) or future (30 days) visit within the authorizing provider's specialty    Patient had office visit in the last 12 months or has a visit in the next 30 days with authorizing provider or within the authorizing provider's specialty.  See \"Patient Info\" tab in inbasket, or \"Choose Columns\" in Meds & Orders section of the refill encounter.           Passed - Patient is age 18 or older       Passed - No active pregnancy on record       Passed - No positive pregnancy test in past 12 months        Last Written Prescription Date:  7/6/17  Last Fill Quantity: 180,  # refills: 3   Last office visit: 7/16/2018 with prescribing provider:  7/16/18   Future Office Visit:      "

## 2018-08-17 ENCOUNTER — TRANSFERRED RECORDS (OUTPATIENT)
Dept: HEALTH INFORMATION MANAGEMENT | Facility: CLINIC | Age: 63
End: 2018-08-17

## 2018-08-27 ENCOUNTER — TELEPHONE (OUTPATIENT)
Dept: INTERNAL MEDICINE | Facility: CLINIC | Age: 63
End: 2018-08-27

## 2018-08-27 NOTE — TELEPHONE ENCOUNTER
Patient seeing cardiologist Wednesday this week and wants to know if Dr. Boyer  Has seen a report from the [facility] (lv. heart scan) yet.  She states they told  Her to see a cardiologist as soon as possible. Score was 830 and above.   She would like a call back as to this report.  [phone].  Works 3-11 ok to leave detailed  msg.

## 2018-08-27 NOTE — TELEPHONE ENCOUNTER
MD out of the office today. I have not seen any report so cannot comment further.  Pt also due for fasting labs now including labs to assess current cholesterol status and whether a stronger statin med would be indicated based on limited info provided below. Recommend pt both schedule fasting lab appt  And pt to also contact the [facility] where scan was done and pt to ask them to  Send report immediately to cardiology clinic (pt may call cardiology clinic to get their fax) so that the report will be available for cardiologist to review when pt seen by them later this week

## 2018-08-28 NOTE — TELEPHONE ENCOUNTER
Informed pt of message below. Anticipates getting cholesterol done on Friday with [facility].   [facility] heart [place] fax number [phone]

## 2018-08-29 ENCOUNTER — OFFICE VISIT (OUTPATIENT)
Dept: CARDIOLOGY | Facility: CLINIC | Age: 63
End: 2018-08-29
Payer: COMMERCIAL

## 2018-08-29 VITALS
WEIGHT: 190.1 LBS | SYSTOLIC BLOOD PRESSURE: 138 MMHG | DIASTOLIC BLOOD PRESSURE: 88 MMHG | BODY MASS INDEX: 28.81 KG/M2 | HEART RATE: 60 BPM | HEIGHT: 68 IN

## 2018-08-29 DIAGNOSIS — Z13.6 SCREENING FOR CARDIOVASCULAR CONDITION: ICD-10-CM

## 2018-08-29 DIAGNOSIS — E78.5 HYPERLIPIDEMIA LDL GOAL <100: ICD-10-CM

## 2018-08-29 DIAGNOSIS — R53.83 FATIGUE, UNSPECIFIED TYPE: ICD-10-CM

## 2018-08-29 DIAGNOSIS — I25.10 CORONARY ARTERY DISEASE INVOLVING NATIVE CORONARY ARTERY OF NATIVE HEART WITHOUT ANGINA PECTORIS: Primary | ICD-10-CM

## 2018-08-29 PROCEDURE — 93000 ELECTROCARDIOGRAM COMPLETE: CPT | Performed by: INTERNAL MEDICINE

## 2018-08-29 PROCEDURE — 99204 OFFICE O/P NEW MOD 45 MIN: CPT | Performed by: INTERNAL MEDICINE

## 2018-08-29 RX ORDER — ATORVASTATIN CALCIUM 20 MG/1
20 TABLET, FILM COATED ORAL DAILY
Qty: 90 TABLET | Refills: 3 | Status: SHIPPED | OUTPATIENT
Start: 2018-08-29 | End: 2019-08-20

## 2018-08-29 NOTE — MR AVS SNAPSHOT
After Visit Summary   8/29/2018    Jenna Card    MRN: [id number]           Patient Information     Date Of Birth          1955        Visit Information        Provider Department      8/29/2018 10:30 AM Chris Lozano MD [facility]        Today's Diagnoses     Screening for cardiovascular condition    -  1    Coronary artery disease involving native coronary artery of native heart without angina pectoris        Fatigue, unspecified type           Follow-ups after your visit        Your next 10 appointments already scheduled     Sep 05, 2018  8:00 AM CDT   New Sleep Patient with Reinier العراقي MD   [facility] ([facility])    [address]  [facility] [address]   [phone]            Sep 11, 2018 12:45 PM CDT   Ech Complete with [id number]   [facility] CV Echocardiography (Cardiovascular [facility] at [facility])    [address] [id number] [phone]           1.  Please bring or wear a comfortable two-piece outfit. 2.  You may eat, drink and take your normal medicines. 3.  For any questions that cannot be answered, please contact the ordering physician 4.  Please do not wear perfumes or scented lotions on the day of your exam.              Future tests that were ordered for you today     Open Future Orders        Priority Expected Expires Ordered    Lipid Profile Routine 11/27/2018 8/29/2019 8/29/2018    Echocardiogram Routine 9/5/2018 8/29/2019 8/29/2018            Who to contact     If you have questions or need follow up information about today's clinic visit or your schedule please contact [facility] directly at [phone].  Normal or non-critical lab and imaging results will be communicated to you by MyChart, letter or phone within 4 business days after the clinic has  "received the results. If you do not hear from us within 7 days, please contact the clinic through [employer] or phone. If you have a critical or abnormal lab result, we will notify you by phone as soon as possible.  Submit refill requests through [employer] or call your pharmacy and they will forward the refill request to us. Please allow 3 business days for your refill to be completed.          Additional Information About Your Visit        [employer] Information     [employer] gives you secure access to your electronic health record. If you see a primary care provider, you can also send messages to your care team and make appointments. If you have questions, please call your primary care clinic.  If you do not have a primary care provider, please call [phone] and they will assist you.        Care EveryWhere ID     This is your Care EveryWhere ID. This could be used by other organizations to access your [place] medical records  [id number]        Your Vitals Were     Pulse Height Breastfeeding? BMI (Body Mass Index)          60 1.715 m (5' 7.5\") No 29.33 kg/m2         Blood Pressure from Last 3 Encounters:   08/29/18 138/88   07/16/18 132/78   07/06/17 136/80    Weight from Last 3 Encounters:   08/29/18 86.2 kg (190 lb 1.6 oz)   07/16/18 87.1 kg (192 lb)   07/06/17 88 kg (194 lb)              We Performed the Following     EKG 12-lead complete w/read - Clinics (performed today)          Today's Medication Changes          These changes are accurate as of 8/29/18 11:13 AM.  If you have any questions, ask your nurse or doctor.               Start taking these medicines.        Dose/Directions    atorvastatin 20 MG tablet   Commonly known as:  LIPITOR   Used for:  Coronary artery disease involving native coronary artery of native heart without angina pectoris   Started by:  Chris Lozano MD        Dose:  20 mg   Take 1 tablet (20 mg) by mouth daily   Quantity:  90 tablet   Refills:  3         These medicines have " changed or have updated prescriptions.        Dose/Directions    albuterol 108 (90 Base) MCG/ACT inhaler   Commonly known as:  PROAIR HFA/PROVENTIL HFA/VENTOLIN HFA   This may have changed:  when to take this   Used for:  Acute bronchitis with symptoms > 10 days        Dose:  2 puff   Inhale 2 puffs into the lungs every 6 hours as needed for shortness of breath / dyspnea or wheezing   Quantity:  1 Inhaler   Refills:  0       fluticasone 110 MCG/ACT Inhaler   Commonly known as:  FLOVENT HFA   This may have changed:    - when to take this  - reasons to take this   Used for:  Acute bronchitis with symptoms > 10 days        Dose:  2 puff   Inhale 2 puffs into the lungs 2 times daily   Quantity:  1 Inhaler   Refills:  1       triamcinolone 55 MCG/ACT inhaler   Commonly known as:  NASACORT AQ   This may have changed:    - when to take this  - reasons to take this   Used for:  Post-nasal drainage        Dose:  2 spray   Spray 2 sprays into both nostrils daily   Quantity:  1 Bottle   Refills:  0         Stop taking these medicines if you haven't already. Please contact your care team if you have questions.     lovastatin 20 MG tablet   Commonly known as:  MEVACOR   Stopped by:  Chris Lozano MD                Where to get your medicines      These medications were sent to [id number] - [facility] [address]  [address] [facility] [address]    Hours:  9Am-9Pm (M-F) 9Am-6Pm (S&S) Phone:  [phone]     atorvastatin 20 MG tablet                Primary Care Provider Office Phone # Fax #    Wilmar Boyer [phone]       [address] [place] [id number]        Equal Access to Services     BRIANA SON : Haddarnell walden Soadriana, waaxda luqadaha, qaybta kaalmamillicent pringle. [facility] [phone].    ATENCIÓN: Si habla español, tiene a weiss disposición servicios gratuitos de asistencia lingüística. Llame al [phone].    We comply  with applicable federal civil rights laws and [place] laws. We do not discriminate on the basis of race, color, national origin, age, disability, sex, sexual orientation, or gender identity.            Thank you!     Thank you for choosing [facility]  for your care. Our goal is always to provide you with excellent care. Hearing back from our patients is one way we can continue to improve our services. Please take a few minutes to complete the written survey that you may receive in the mail after your visit with us. Thank you!             Your Updated Medication List - Protect others around you: Learn how to safely use, store and throw away your medicines at www.disposemymeds.org.          This list is accurate as of 8/29/18 11:13 AM.  Always use your most recent med list.                   Brand Name Dispense Instructions for use Diagnosis    albuterol 108 (90 Base) MCG/ACT inhaler    PROAIR HFA/PROVENTIL HFA/VENTOLIN HFA    1 Inhaler    Inhale 2 puffs into the lungs every 6 hours as needed for shortness of breath / dyspnea or wheezing    Acute bronchitis with symptoms > 10 days       allopurinol 300 MG tablet    ZYLOPRIM    30 tablet    TAKE ONE TABLET BY MOUTH ONE TIME DAILY    Chronic gout without tophus, unspecified cause, unspecified site       aspirin 81 MG tablet      Take 81 mg by mouth daily        atenolol 50 MG tablet    TENORMIN    60 tablet    TAKE TWO TABLETS BY MOUTH DAILY    Essential hypertension       atorvastatin 20 MG tablet    LIPITOR    90 tablet    Take 1 tablet (20 mg) by mouth daily    Coronary artery disease involving native coronary artery of native heart without angina pectoris       blood glucose monitoring lancets     1 Box    Use to test blood sugar one time daily or as directed.    Type 2 diabetes, HbA1c goal < 7% (H)       blood glucose monitoring meter device kit           blood glucose monitoring test strip    ACCU-CHEK MARSHALL PLUS    1  Box    Check blood sugars once daily    Type 2 diabetes mellitus without complication, without long-term current use of insulin (H)       fish oil-omega-3 fatty acids 1000 MG capsule      Take 1 capsule (1 g) by mouth daily    Hyperlipidemia LDL goal <100       fluticasone 110 MCG/ACT Inhaler    FLOVENT HFA    1 Inhaler    Inhale 2 puffs into the lungs 2 times daily    Acute bronchitis with symptoms > 10 days       hydrochlorothiazide 25 MG tablet    HYDRODIURIL    90 tablet    TAKE ONE TABLET BY MOUTH IN THE MORNING    Essential hypertension       levothyroxine 125 MCG tablet    SYNTHROID/LEVOTHROID    30 tablet    1/2 tab daily (total 62.5mcg per day)    Hypothyroidism, unspecified type       losartan 100 MG tablet    COZAAR    90 tablet    TAKE HALF TABLET BY MOUTH TWICE DAILY    Essential hypertension       metFORMIN 500 MG tablet    GLUCOPHAGE    180 tablet    TAKE ONE TABLET BY MOUTH TWICE DAILY WITH MEALS    Type 2 diabetes mellitus without complication, without long-term current use of insulin (H)       triamcinolone 55 MCG/ACT inhaler    NASACORT AQ    1 Bottle    Spray 2 sprays into both nostrils daily    Post-nasal drainage

## 2018-08-29 NOTE — TELEPHONE ENCOUNTER
"Requested Prescriptions   Pending Prescriptions Disp Refills     lovastatin (MEVACOR) 20 MG tablet [Pharmacy Med Name: Lovastatin Oral Tablet 20 MG] 30 tablet 0    Last Written Prescription Date:  07/31/2018  Last Fill Quantity: 30,  # refills: 0   Last office visit: 7/16/2018 with prescribing provider:  Merlin    Future Office Visit:     Sig: Take 1 tablet (20 mg) by mouth At Bedtime. NEED LABS FOR FURTHER REFILLS PER MD.    Statins Protocol Failed    8/29/2018  7:01 AM       Failed - LDL on file in past 12 months    Recent Labs   Lab Test  07/06/17   1026   LDL  85            Passed - No abnormal creatine kinase in past 12 months    No lab results found.            Passed - Recent (12 mo) or future (30 days) visit within the authorizing provider's specialty    Patient had office visit in the last 12 months or has a visit in the next 30 days with authorizing provider or within the authorizing provider's specialty.  See \"Patient Info\" tab in inbasket, or \"Choose Columns\" in Meds & Orders section of the refill encounter.           Passed - Patient is age 18 or older       Passed - No active pregnancy on record       Passed - No positive pregnancy test in past 12 months          "

## 2018-08-29 NOTE — PROGRESS NOTES
HPI and Plan:   See dictation    Orders Placed This Encounter   Procedures     Lipid Profile     EKG 12-lead complete w/read - Clinics (performed today)     Echocardiogram       Orders Placed This Encounter   Medications     atorvastatin (LIPITOR) 20 MG tablet     Sig: Take 1 tablet (20 mg) by mouth daily     Dispense:  90 tablet     Refill:  3       Medications Discontinued During This Encounter   Medication Reason     lovastatin (MEVACOR) 20 MG tablet          Encounter Diagnoses   Name Primary?     Screening for cardiovascular condition      Coronary artery disease involving native coronary artery of native heart without angina pectoris Yes     Fatigue, unspecified type        CURRENT MEDICATIONS:  Current Outpatient Prescriptions   Medication Sig Dispense Refill     albuterol (PROAIR HFA, PROVENTIL HFA, VENTOLIN HFA) 108 (90 BASE) MCG/ACT inhaler Inhale 2 puffs into the lungs every 6 hours as needed for shortness of breath / dyspnea or wheezing (Patient taking differently: Inhale 2 puffs into the lungs as needed for shortness of breath / dyspnea or wheezing ) 1 Inhaler 0     allopurinol (ZYLOPRIM) 300 MG tablet TAKE ONE TABLET BY MOUTH ONE TIME DAILY  30 tablet 11     aspirin 81 MG tablet Take 81 mg by mouth daily       atenolol (TENORMIN) 50 MG tablet TAKE TWO TABLETS BY MOUTH DAILY  60 tablet 11     atorvastatin (LIPITOR) 20 MG tablet Take 1 tablet (20 mg) by mouth daily 90 tablet 3     blood glucose monitoring (ACCU-CHEK MARSHALL PLUS) meter device kit   99     blood glucose monitoring (ACCU-CHEK MARSHALL PLUS) test strip Check blood sugars once daily 1 Box 11     blood glucose monitoring (ACCU-CHEK MULTICLIX) lancets Use to test blood sugar one time daily or as directed. 1 Box 11     fluticasone (FLOVENT HFA) 110 MCG/ACT inhaler Inhale 2 puffs into the lungs 2 times daily (Patient taking differently: Inhale 2 puffs into the lungs as needed ) 1 Inhaler 1     hydrochlorothiazide (HYDRODIURIL) 25 MG tablet TAKE ONE  TABLET BY MOUTH IN THE MORNING  90 tablet 1     levothyroxine (SYNTHROID/LEVOTHROID) 125 MCG tablet 1/2 tab daily (total 62.5mcg per day) 30 tablet 11     losartan (COZAAR) 100 MG tablet TAKE HALF TABLET BY MOUTH TWICE DAILY  90 tablet 1     metFORMIN (GLUCOPHAGE) 500 MG tablet TAKE ONE TABLET BY MOUTH TWICE DAILY WITH MEALS 180 tablet 0     Omega-3 Fatty Acids (OMEGA-3 FISH OIL) 1000 MG CAPS Take 1 capsule (1 g) by mouth daily       triamcinolone (NASACORT AQ) 55 MCG/ACT nasal inhaler Spray 2 sprays into both nostrils daily (Patient taking differently: Spray 2 sprays into both nostrils as needed ) 1 Bottle 0       ALLERGIES     Allergies   Allergen Reactions     Dapsone Hives     Sulfa Drugs Hives     Allergy to Dapsone- wants to stay away from all sulfa drugs       PAST MEDICAL HISTORY:  Past Medical History:   Diagnosis Date     Celiac disease      Cough     most laureano.  Cough lasting 6-8 weeks     Gout      Hepatitis     trhought related to celiac disease along with possible steatohepatitis     HTN (hypertension)      Hyperlipidemia LDL goal <100      Hypothyroidism 9/29/2014     Obesity 9/29/2014     Polyp of colon 2009    3mm. Hyperplastic     Type 2 diabetes mellitus without complication  (goal A1C<7) 10/20/2015       PAST SURGICAL HISTORY:  Past Surgical History:   Procedure Laterality Date     BIOPSY  1985    Dermatitis Herpetiformis diagnosis     C NONSPECIFIC PROCEDURE Left     arthroscopic knee surgery     C NONSPECIFIC PROCEDURE  1996     D&C for dysfunctional uterine bleeding - findings benign     COLONOSCOPY  2008    1 polyp removed     ENT SURGERY  1958    tonsils removed     ORTHOPEDIC SURGERY  2008    arthroscopic left knee       FAMILY HISTORY:  Family History   Problem Relation Age of Onset     GASTROINTESTINAL DISEASE Father      colon polyps     Hypertension Father      Hyperlipidemia Father      Cerebrovascular Disease Father      Obesity Father      Breast Cancer Maternal Grandmother       "Cancer - colorectal Other      mother's brother     Cerebrovascular Disease Other      Father's brother     Interstitial Lung Disease Mother      Pulmnary fibrosis       SOCIAL HISTORY:  Social History     Social History     Marital status: Single     Spouse name: N/A     Number of children: N/A     Years of education: N/A     Occupational History      [employer],[address]      Pharmacist at [employer]     Social History Main Topics     Smoking status: Never Smoker     Smokeless tobacco: Never Used     Alcohol use Yes      Comment: 1 drink weekly     Drug use: No     Sexual activity: No     Other Topics Concern     Parent/Sibling W/ Cabg, Mi Or Angioplasty Before 65f 55m? No     Social History Narrative       Review of Systems:  Skin:  Negative       Eyes:  Positive for glasses    ENT:  Negative      Respiratory:  Negative       Cardiovascular:  Negative      Gastroenterology: Negative      Genitourinary:  Negative      Musculoskeletal:  Positive for arthritis    Neurologic:  Negative      Psychiatric:  Negative      Heme/Lymph/Imm:  Negative      Endocrine:  Positive for thyroid disorder;diabetes      Physical Exam:  Vitals: /88 (BP Location: Right arm, Patient Position: Sitting, Cuff Size: Adult Regular)  Pulse 60  Ht 1.715 m (5' 7.5\")  Wt 86.2 kg (190 lb 1.6 oz)  Breastfeeding? No  BMI 29.33 kg/m2    Constitutional:  cooperative;in no acute distress        Skin:  warm and dry to the touch;no apparent skin lesions or masses noted          Head:  normocephalic        Eyes:  conjunctivae and lids unremarkable        Lymph:No Cervical lymphadenopathy present     ENT:           Neck:  carotid pulses are full and equal bilaterally;JVP normal;no carotid bruit        Respiratory:  clear to auscultation         Cardiac: regular rhythm;normal S1 and S2;no murmurs, gallops or rubs detected                pulses full and equal, no bruits auscultated                                        GI:  " abdomen soft;no masses;non-tender        Extremities and Muscular Skeletal:  no edema              Neurological:  no gross motor deficits        Psych:  Alert and Oriented x 3        CC  Wilmar Boyer MD  [address] [place] [address]

## 2018-08-29 NOTE — LETTER
8/29/2018    Wilmar Boyer MD  [address] [facility] [address]    RE: Jenna Card       Dear Colleague,    I had the pleasure of seeing Jenna Card in the [facility].    HPI and Plan:   See dictation    Orders Placed This Encounter   Procedures     Lipid Profile     EKG 12-lead complete w/read - Clinics (performed today)     Echocardiogram       Orders Placed This Encounter   Medications     atorvastatin (LIPITOR) 20 MG tablet     Sig: Take 1 tablet (20 mg) by mouth daily     Dispense:  90 tablet     Refill:  3       Medications Discontinued During This Encounter   Medication Reason     lovastatin (MEVACOR) 20 MG tablet          Encounter Diagnoses   Name Primary?     Screening for cardiovascular condition      Coronary artery disease involving native coronary artery of native heart without angina pectoris Yes     Fatigue, unspecified type        CURRENT MEDICATIONS:  Current Outpatient Prescriptions   Medication Sig Dispense Refill     albuterol (PROAIR HFA, PROVENTIL HFA, VENTOLIN HFA) 108 (90 BASE) MCG/ACT inhaler Inhale 2 puffs into the lungs every 6 hours as needed for shortness of breath / dyspnea or wheezing (Patient taking differently: Inhale 2 puffs into the lungs as needed for shortness of breath / dyspnea or wheezing ) 1 Inhaler 0     allopurinol (ZYLOPRIM) 300 MG tablet TAKE ONE TABLET BY MOUTH ONE TIME DAILY  30 tablet 11     aspirin 81 MG tablet Take 81 mg by mouth daily       atenolol (TENORMIN) 50 MG tablet TAKE TWO TABLETS BY MOUTH DAILY  60 tablet 11     atorvastatin (LIPITOR) 20 MG tablet Take 1 tablet (20 mg) by mouth daily 90 tablet 3     blood glucose monitoring (ACCU-CHEK MARSHALL PLUS) meter device kit   99     blood glucose monitoring (ACCU-CHEK MARSHALL PLUS) test strip Check blood sugars once daily 1 Box 11     blood glucose monitoring (ACCU-CHEK MULTICLIX) lancets Use to test blood sugar one time daily or as directed. 1 Box 11     fluticasone (FLOVENT  HFA) 110 MCG/ACT inhaler Inhale 2 puffs into the lungs 2 times daily (Patient taking differently: Inhale 2 puffs into the lungs as needed ) 1 Inhaler 1     hydrochlorothiazide (HYDRODIURIL) 25 MG tablet TAKE ONE TABLET BY MOUTH IN THE MORNING  90 tablet 1     levothyroxine (SYNTHROID/LEVOTHROID) 125 MCG tablet 1/2 tab daily (total 62.5mcg per day) 30 tablet 11     losartan (COZAAR) 100 MG tablet TAKE HALF TABLET BY MOUTH TWICE DAILY  90 tablet 1     metFORMIN (GLUCOPHAGE) 500 MG tablet TAKE ONE TABLET BY MOUTH TWICE DAILY WITH MEALS 180 tablet 0     Omega-3 Fatty Acids (OMEGA-3 FISH OIL) 1000 MG CAPS Take 1 capsule (1 g) by mouth daily       triamcinolone (NASACORT AQ) 55 MCG/ACT nasal inhaler Spray 2 sprays into both nostrils daily (Patient taking differently: Spray 2 sprays into both nostrils as needed ) 1 Bottle 0       ALLERGIES     Allergies   Allergen Reactions     Dapsone Hives     Sulfa Drugs Hives     Allergy to Dapsone- wants to stay away from all sulfa drugs       PAST MEDICAL HISTORY:  Past Medical History:   Diagnosis Date     Celiac disease      Cough     most laureano.  Cough lasting 6-8 weeks     Gout      Hepatitis     trhought related to celiac disease along with possible steatohepatitis     HTN (hypertension)      Hyperlipidemia LDL goal <100      Hypothyroidism 9/29/2014     Obesity 9/29/2014     Polyp of colon 2009    3mm. Hyperplastic     Type 2 diabetes mellitus without complication  (goal A1C<7) 10/20/2015       PAST SURGICAL HISTORY:  Past Surgical History:   Procedure Laterality Date     BIOPSY  1985    Dermatitis Herpetiformis diagnosis     C NONSPECIFIC PROCEDURE Left     arthroscopic knee surgery     C NONSPECIFIC PROCEDURE  1996     D&C for dysfunctional uterine bleeding - findings benign     COLONOSCOPY  2008    1 polyp removed     ENT SURGERY  1958    tonsils removed     ORTHOPEDIC SURGERY  2008    arthroscopic left knee       FAMILY HISTORY:  Family History   Problem Relation Age of  "Onset     GASTROINTESTINAL DISEASE Father      colon polyps     Hypertension Father      Hyperlipidemia Father      Cerebrovascular Disease Father      Obesity Father      Breast Cancer Maternal Grandmother      Cancer - colorectal Other      mother's brother     Cerebrovascular Disease Other      Father's brother     Interstitial Lung Disease Mother      Pulmnary fibrosis       SOCIAL HISTORY:  Social History     Social History     Marital status: Single     Spouse name: N/A     Number of children: N/A     Years of education: N/A     Occupational History      [employer] [place],[address]      Pharmacist at [employer]     Social History Main Topics     Smoking status: Never Smoker     Smokeless tobacco: Never Used     Alcohol use Yes      Comment: 1 drink weekly     Drug use: No     Sexual activity: No     Other Topics Concern     Parent/Sibling W/ Cabg, Mi Or Angioplasty Before 65f 55m? No     Social History Narrative       Review of Systems:  Skin:  Negative       Eyes:  Positive for glasses    ENT:  Negative      Respiratory:  Negative       Cardiovascular:  Negative      Gastroenterology: Negative      Genitourinary:  Negative      Musculoskeletal:  Positive for arthritis    Neurologic:  Negative      Psychiatric:  Negative      Heme/Lymph/Imm:  Negative      Endocrine:  Positive for thyroid disorder;diabetes      Physical Exam:  Vitals: /88 (BP Location: Right arm, Patient Position: Sitting, Cuff Size: Adult Regular)  Pulse 60  Ht 1.715 m (5' 7.5\")  Wt 86.2 kg (190 lb 1.6 oz)  Breastfeeding? No  BMI 29.33 kg/m2    Constitutional:  cooperative;in no acute distress        Skin:  warm and dry to the touch;no apparent skin lesions or masses noted          Head:  normocephalic        Eyes:  conjunctivae and lids unremarkable        Lymph:No Cervical lymphadenopathy present     ENT:           Neck:  carotid pulses are full and equal bilaterally;JVP normal;no carotid bruit        Respiratory:  " clear to auscultation         Cardiac: regular rhythm;normal S1 and S2;no murmurs, gallops or rubs detected                pulses full and equal, no bruits auscultated                                        GI:  abdomen soft;no masses;non-tender        Extremities and Muscular Skeletal:  no edema              Neurological:  no gross motor deficits        Psych:  Alert and Oriented x 3        CC  Wilmar Boyer MD  [address] [place] [address]                Thank you for allowing me to participate in the care of your patient.      Sincerely,     Chris Lozano MD, MD     [facility]    cc:   Wilmar Boyer MD  [address] [place] [id number]

## 2018-08-29 NOTE — PROGRESS NOTES
Service Date: 08/29/2018      REASON FOR CONSULTATION:  Positive CT calcium score.      PRIMARY CARE PHYSICIAN:  Dr. Wilmar Boyer.      HISTORY OF PRESENT ILLNESS:  I had the pleasure of seeing Jenna Card at the [facility] in [place] this morning.  She is a very pleasant 62-year-old female who is a patient of Dr. Ramakrishna Boyer.  She has known history of hypertension, hyperlipidemia and type 2 diabetes.  There is no family history of premature coronary artery disease.  She denies any cardiopulmonary symptoms at this point, although she has noticed some fatigue lately.      She underwent CT coronary calcium score at an outside facility earlier this year.  The scan showed calcium in patient's LAD, circumflex and RCA.  Overall, her calcium score was 830, which placed her at the 98th percentile.  Because of this, she was referred for further evaluation.      As discussed above, the patient denies significant exertional symptoms at this point including chest pain or shortness of breath.  She does not exercise regularly.      IMPRESSION AND PLAN:   1.  Coronary artery disease based on recent positive CT calcium score.   2.  Diabetes.   3.  Hypertension.   4.  Hyperlipidemia.   5.  Overweight.      I reviewed the patient's CT calcium score from [facility].  The scan shows that there is atherosclerotic plaque in the patient's coronary arteries.  All of her coronary arteries do have calcium suggesting diffuse atherosclerotic coronary artery disease.      The patient currently does not endorse any significant symptoms; therefore, I doubt that she has significant obstructive coronary artery disease.  We discussed the importance of risk factor control, given her recent diagnosis of coronary artery disease.  Her diabetes and blood pressure are well-controlled with the current program.  She has been on lovastatin for many years.  I recommended that we switch her to a different  statin.  She will start atorvastatin 20 mg daily.  She will continue low-dose aspirin indefinitely.      Given her fatigue, which could be multifactorial, we will obtain an echocardiogram to assess her LV function.  ECG obtained in the office today showed no evidence of prior infarction.  No acute ST-T changes are noted in the EKG either.      It was a pleasure seeing Ms. Bullard in the clinic this morning.  I appreciate the opportunity to be part of her care.      cc:   [place] [address]         CAMILLE JOSHUA MD             D: 2018   T: 2018   MT: al      Name:     SAUL BULLARD   MRN:      [id number]        Account:      [id number]   :      1955           Service Date: 2018      Document: [id number]

## 2018-08-29 NOTE — LETTER
8/29/2018      Wilmar Boyer MD  [address] [facility] [address]      RE: Jenna Card       Dear Colleague,    I had the pleasure of seeing Jenna Card in the [facility].    Service Date: 08/29/2018      REASON FOR CONSULTATION:  Positive CT calcium score.      PRIMARY CARE PHYSICIAN:  Dr. Wilmar Boyer.      HISTORY OF PRESENT ILLNESS:  I had the pleasure of seeing Jenna Card at the [facility] in [place] this morning.  She is a very pleasant 62-year-old female who is a patient of Dr. Ramakrishna Boyer.  She has known history of hypertension, hyperlipidemia and type 2 diabetes.  There is no family history of premature coronary artery disease.  She denies any cardiopulmonary symptoms at this point, although she has noticed some fatigue lately.      She underwent CT coronary calcium score at an outside facility earlier this year.  The scan showed calcium in patient's LAD, circumflex and RCA.  Overall, her calcium score was 830, which placed her at the 98th percentile.  Because of this, she was referred for further evaluation.      As discussed above, the patient denies significant exertional symptoms at this point including chest pain or shortness of breath.  She does not exercise regularly.      IMPRESSION AND PLAN:   1.  Coronary artery disease based on recent positive CT calcium score.   2.  Diabetes.   3.  Hypertension.   4.  Hyperlipidemia.   5.  Overweight.      I reviewed the patient's CT calcium score from [facility].  The scan shows that there is atherosclerotic plaque in the patient's coronary arteries.  All of her coronary arteries do have calcium suggesting diffuse atherosclerotic coronary artery disease.      The patient currently does not endorse any significant symptoms; therefore, I doubt that she has significant obstructive coronary artery disease.  We discussed the importance of risk factor control, given her  recent diagnosis of coronary artery disease.  Her diabetes and blood pressure are well-controlled with the current program.  She has been on lovastatin for many years.  I recommended that we switch her to a different statin.  She will start atorvastatin 20 mg daily.  She will continue low-dose aspirin indefinitely.      Given her fatigue, which could be multifactorial, we will obtain an echocardiogram to assess her LV function.  ECG obtained in the office today showed no evidence of prior infarction.  No acute ST-T changes are noted in the EKG either.      It was a pleasure seeing Ms. Bullard in the clinic this morning.  I appreciate the opportunity to be part of her care.      cc:   [place] [address]         CAMILLE JOSHUA MD             D: 2018   T: 2018   MT: al      Name:     SAUL BULLARD   MRN:      [id number]        Account:      [id number]   :      1955           Service Date: 2018      Document: [id number]         Outpatient Encounter Prescriptions as of 2018   Medication Sig Dispense Refill     albuterol (PROAIR HFA, PROVENTIL HFA, VENTOLIN HFA) 108 (90 BASE) MCG/ACT inhaler Inhale 2 puffs into the lungs every 6 hours as needed for shortness of breath / dyspnea or wheezing (Patient taking differently: Inhale 2 puffs into the lungs as needed for shortness of breath / dyspnea or wheezing ) 1 Inhaler 0     allopurinol (ZYLOPRIM) 300 MG tablet TAKE ONE TABLET BY MOUTH ONE TIME DAILY  30 tablet 11     aspirin 81 MG tablet Take 81 mg by mouth daily       atenolol (TENORMIN) 50 MG tablet TAKE TWO TABLETS BY MOUTH DAILY  60 tablet 11     atorvastatin (LIPITOR) 20 MG tablet Take 1 tablet (20 mg) by mouth daily 90 tablet 3     blood glucose monitoring (ACCU-CHEK MARSHALL PLUS) meter device kit   99     blood glucose monitoring (ACCU-CHEK MARSHALL PLUS) test strip Check blood sugars once daily 1 Box 11     blood glucose  monitoring (ACCU-CHEK MULTICLIX) lancets Use to test blood sugar one time daily or as directed. 1 Box 11     fluticasone (FLOVENT HFA) 110 MCG/ACT inhaler Inhale 2 puffs into the lungs 2 times daily (Patient taking differently: Inhale 2 puffs into the lungs as needed ) 1 Inhaler 1     hydrochlorothiazide (HYDRODIURIL) 25 MG tablet TAKE ONE TABLET BY MOUTH IN THE MORNING  90 tablet 1     levothyroxine (SYNTHROID/LEVOTHROID) 125 MCG tablet 1/2 tab daily (total 62.5mcg per day) 30 tablet 11     losartan (COZAAR) 100 MG tablet TAKE HALF TABLET BY MOUTH TWICE DAILY  90 tablet 1     metFORMIN (GLUCOPHAGE) 500 MG tablet TAKE ONE TABLET BY MOUTH TWICE DAILY WITH MEALS 180 tablet 0     Omega-3 Fatty Acids (OMEGA-3 FISH OIL) 1000 MG CAPS Take 1 capsule (1 g) by mouth daily       triamcinolone (NASACORT AQ) 55 MCG/ACT nasal inhaler Spray 2 sprays into both nostrils daily (Patient taking differently: Spray 2 sprays into both nostrils as needed ) 1 Bottle 0     [DISCONTINUED] lovastatin (MEVACOR) 20 MG tablet Take 1 tablet (20 mg) by mouth At Bedtime 30 tablet 0     No facility-administered encounter medications on file as of 8/29/2018.        Again, thank you for allowing me to participate in the care of your patient.      Sincerely,    Chris Lozano MD, MD     [facility]

## 2018-08-29 NOTE — TELEPHONE ENCOUNTER
Medication was changed to Lipitor by cardiology today, can PCP please discontinue/refuse medication.

## 2018-08-30 DIAGNOSIS — E78.5 HYPERLIPIDEMIA LDL GOAL <100: ICD-10-CM

## 2018-08-30 DIAGNOSIS — E11.9 TYPE 2 DIABETES MELLITUS WITHOUT COMPLICATION, WITHOUT LONG-TERM CURRENT USE OF INSULIN (H): ICD-10-CM

## 2018-08-30 DIAGNOSIS — E03.9 HYPOTHYROIDISM, UNSPECIFIED TYPE: ICD-10-CM

## 2018-08-30 DIAGNOSIS — K75.9 HEPATITIS: ICD-10-CM

## 2018-08-30 LAB
ERYTHROCYTE [SEDIMENTATION RATE] IN BLOOD BY WESTERGREN METHOD: 11 MM/H (ref 0–30)
HBA1C MFR BLD: 6.5 % (ref 0–5.6)

## 2018-08-30 PROCEDURE — 80061 LIPID PANEL: CPT | Performed by: INTERNAL MEDICINE

## 2018-08-30 PROCEDURE — 83036 HEMOGLOBIN GLYCOSYLATED A1C: CPT | Performed by: INTERNAL MEDICINE

## 2018-08-30 PROCEDURE — 82043 UR ALBUMIN QUANTITATIVE: CPT | Performed by: INTERNAL MEDICINE

## 2018-08-30 PROCEDURE — 83550 IRON BINDING TEST: CPT | Performed by: INTERNAL MEDICINE

## 2018-08-30 PROCEDURE — 83540 ASSAY OF IRON: CPT | Performed by: INTERNAL MEDICINE

## 2018-08-30 PROCEDURE — 84443 ASSAY THYROID STIM HORMONE: CPT | Performed by: INTERNAL MEDICINE

## 2018-08-30 PROCEDURE — 80053 COMPREHEN METABOLIC PANEL: CPT | Performed by: INTERNAL MEDICINE

## 2018-08-30 PROCEDURE — 36415 COLL VENOUS BLD VENIPUNCTURE: CPT | Performed by: INTERNAL MEDICINE

## 2018-08-30 PROCEDURE — 85652 RBC SED RATE AUTOMATED: CPT | Performed by: INTERNAL MEDICINE

## 2018-08-30 PROCEDURE — 84439 ASSAY OF FREE THYROXINE: CPT | Performed by: INTERNAL MEDICINE

## 2018-08-30 RX ORDER — LOVASTATIN 20 MG
TABLET ORAL
Qty: 30 TABLET | Refills: 0 | OUTPATIENT
Start: 2018-08-30

## 2018-08-31 LAB
ALBUMIN SERPL-MCNC: 4.1 G/DL (ref 3.4–5)
ALP SERPL-CCNC: 54 U/L (ref 40–150)
ALT SERPL W P-5'-P-CCNC: 46 U/L (ref 0–50)
ANION GAP SERPL CALCULATED.3IONS-SCNC: 14 MMOL/L (ref 3–14)
AST SERPL W P-5'-P-CCNC: 49 U/L (ref 0–45)
BILIRUB SERPL-MCNC: 0.5 MG/DL (ref 0.2–1.3)
BUN SERPL-MCNC: 23 MG/DL (ref 7–30)
CALCIUM SERPL-MCNC: 8.8 MG/DL (ref 8.5–10.1)
CHLORIDE SERPL-SCNC: 105 MMOL/L (ref 94–109)
CHOLEST SERPL-MCNC: 156 MG/DL
CO2 SERPL-SCNC: 20 MMOL/L (ref 20–32)
CREAT SERPL-MCNC: 0.72 MG/DL (ref 0.52–1.04)
GFR SERPL CREATININE-BSD FRML MDRD: 81 ML/MIN/1.7M2
GLUCOSE SERPL-MCNC: 103 MG/DL (ref 70–99)
HDLC SERPL-MCNC: 39 MG/DL
IRON SATN MFR SERPL: 22 % (ref 15–46)
IRON SERPL-MCNC: 80 UG/DL (ref 35–180)
LDLC SERPL CALC-MCNC: 79 MG/DL
NONHDLC SERPL-MCNC: 117 MG/DL
POTASSIUM SERPL-SCNC: 4.3 MMOL/L (ref 3.4–5.3)
PROT SERPL-MCNC: 7.2 G/DL (ref 6.8–8.8)
SODIUM SERPL-SCNC: 139 MMOL/L (ref 133–144)
T4 FREE SERPL-MCNC: 1.27 NG/DL (ref 0.76–1.46)
TIBC SERPL-MCNC: 372 UG/DL (ref 240–430)
TRIGL SERPL-MCNC: 191 MG/DL
TSH SERPL DL<=0.005 MIU/L-ACNC: 5.09 MU/L (ref 0.4–4)

## 2018-09-02 LAB
CREAT UR-MCNC: 89 MG/DL
MICROALBUMIN UR-MCNC: 14 MG/L
MICROALBUMIN/CREAT UR: 15.84 MG/G CR (ref 0–25)

## 2018-09-04 ENCOUNTER — TELEPHONE (OUTPATIENT)
Dept: CARDIOLOGY | Facility: CLINIC | Age: 63
End: 2018-09-04

## 2018-09-04 NOTE — TELEPHONE ENCOUNTER
Received VM from patient stating that she has a friend who is a cardiologist in [place] and she discussed the plan to get an echo done and they were wondering rather patient should have stress echo done instead prior to her surgery.     Tried to call patient back. No answer. Left VM informing patient of Dr. Lozano's note below. Asked patient what surgery she was scheduled to have. Left team 4 direct number to call back to discuss further.     8/29/18 OV Dr. Lozano  I reviewed the patient's CT calcium score from [facility].  The scan shows that there is atherosclerotic plaque in the patient's coronary arteries.  All of her coronary arteries do have calcium suggesting diffuse atherosclerotic coronary artery disease.       The patient currently does not endorse any significant symptoms; therefore, I doubt that she has significant obstructive coronary artery disease.  We discussed the importance of risk factor control, given her recent diagnosis of coronary artery disease.  Her diabetes and blood pressure are well-controlled with the current program.  She has been on lovastatin for many years.  I recommended that we switch her to a different statin.  She will start atorvastatin 20 mg daily.  She will continue low-dose aspirin indefinitely.       Given her fatigue, which could be multifactorial, we will obtain an echocardiogram to assess her LV function.  ECG obtained in the office today showed no evidence of prior infarction.  No acute ST-T changes are noted in the EKG either.

## 2018-09-05 ENCOUNTER — OFFICE VISIT (OUTPATIENT)
Dept: SLEEP MEDICINE | Facility: CLINIC | Age: 63
End: 2018-09-05
Payer: COMMERCIAL

## 2018-09-05 VITALS
WEIGHT: 187.2 LBS | HEIGHT: 68 IN | BODY MASS INDEX: 28.37 KG/M2 | SYSTOLIC BLOOD PRESSURE: 150 MMHG | DIASTOLIC BLOOD PRESSURE: 68 MMHG | OXYGEN SATURATION: 97 % | HEART RATE: 57 BPM

## 2018-09-05 DIAGNOSIS — E66.3 OVERWEIGHT: ICD-10-CM

## 2018-09-05 DIAGNOSIS — G47.9 SLEEP DISTURBANCE: Primary | ICD-10-CM

## 2018-09-05 DIAGNOSIS — R06.83 SNORING: ICD-10-CM

## 2018-09-05 PROCEDURE — 99204 OFFICE O/P NEW MOD 45 MIN: CPT | Performed by: INTERNAL MEDICINE

## 2018-09-05 RX ORDER — ZOLPIDEM TARTRATE 5 MG/1
TABLET ORAL
Qty: 1 TABLET | Refills: 0 | Status: SHIPPED | OUTPATIENT
Start: 2018-09-05 | End: 2018-11-29

## 2018-09-05 NOTE — NURSING NOTE
"Chief Complaint   Patient presents with     Consult     referral Dr Boyer may have surgery by end of year [employer] 3:00P-11:30P       Initial /68  Pulse 57  Ht 1.715 m (5' 7.52\")  Wt 84.9 kg (187 lb 3.2 oz)  SpO2 97%  BMI 28.87 kg/m2 Estimated body mass index is 28.87 kg/(m^2) as calculated from the following:    Height as of this encounter: 1.715 m (5' 7.52\").    Weight as of this encounter: 84.9 kg (187 lb 3.2 oz).    Medication Reconciliation: complete    Neck circumference: 16 inches / 41 centimeters.    DME: no    ESS 5    Norma Blackwood, Sleep Clinic specialist  "

## 2018-09-05 NOTE — PATIENT INSTRUCTIONS
"MY TREATMENT INFORMATION FOR SLEEP DISTURBANCE-  Jenna Card    DOCTOR : Reinier العراقي MD  SLEEP CENTER :  [place]    MY CONTACT NUMBER:[phone]        If I haven't had a sleep study yet, what can I expect?  A personal story from Hussein  [email or web address]?v=AxPLmlRpnCs        Suspected sleep apnea: Sleep study ordered.    Follow up in sleep clinic 1-2 weeks after sleep study to discuss results of sleep study and treatment options.    Patient was advised not to drive if drowsy or sleepy.    Frequently asked questions:  1. What is Obstructive Sleep Apnea (BIJU)? BIJU is the most common type of sleep apnea. Apnea literally means, \"without breath.\" It is characterized by repetitive pauses in breathing, despite continued effort to breathe, and is usually associated with a reduction in blood oxygen saturation. Apneas can last 10 to over 60 seconds. It is caused by narrowing or collapse of the upper airway as muscles relax during sleep. Severity of sleep apnea is determined by frequency of breathing events and their effect on your sleep and oxygen levels determined during sleep testing.   2. What are the consequences of BIJU? Symptoms include: daytime sleepiness- possibly increasing the risk of falling asleep while driving, unrefreshing/restless sleep, snoring, insomnia, waking frequently to urinate, waking with heartburn or reflux, reduced concentration and memory, and morning headaches. Other health consequences may include development of high blood pressure and other cardiovascular disease in persons who are susceptible. Untreated BIJU  can contribute to heart disease, stroke and diabetes.   3. What are the treatment options? In most situations, sleep apnea is a lifelong disease that must be managed with daily therapy. Medications are not effective for sleep apnea and surgery is generally not performed until other therapies have been tried. Therapy is usually tailored to the individual patient " based on many factors including your wishes as well as severity of sleep apnea and severity of obesity. Continuous Positive Airway (CPAP) is the most reliable treatment. An oral device to hold your jaw forward is usually the next most reliable option. Other options include postioning devices (to keep you off your back), weight loss, and surgery including a tongue pacing device. There is more detail about some of these options below.    Central sleep apnea is a disorder in which your breathing repeatedly stops and starts during sleep.  Central sleep apnea occurs because your brain doesn't send proper signals to the muscles that control your breathing. This condition is different from obstructive sleep apnea, in which you can't breathe normally because of upper airway obstruction. Central sleep apnea is less common than obstructive sleep apnea.  Central sleep apnea may occur as a result of other conditions, such as heart failure and stroke. Sleeping at a high altitude and pain medications also may cause central sleep apnea.        1. CPAP-  WHAT DOES IT DO AND HOW CAN I LEARN TO WEAR IT?                               BEFORE I START, CAN I WATCH A MOVIE TO GET A PLAN ON HOW TO USE CPAP?  https://www.[employer].com/watch?y=c6Q19wv456M      Continuous positive airway pressure, or CPAP, is the most effective treatment for obstructive sleep apnea. It works by blowing room air, through a mask, to hold your throat open. A decision to use CPAP is a major step forward in the pursuit of a healthier life. The successful use of CPAP will help you breathe easier, sleep better and live healthier. You can choose CPAP equipment from any durable medical equipment provider that meets your needs.  Using CPAP can be a positive experience if you keep these hoskins points in mind:  1. Commitment  CPAP is not a quick fix for your problem. It involves a long-term commitment to improve your sleep and your health.    2. Communication  Stay in close  "communication with both your sleep doctor and your CPAP supplier. Ask lots of questions and seek help when you need it.    3. Consistency  Use CPAP all night, every night and for every nap. You will receive the maximum health benefits from CPAP when you use it every time that you sleep. This will also make it easier for your body to adjust to the treatment.    4. Correction  The first machine and mask that you try may not be the best ones for you. Work with your sleep doctor and your CPAP supplier to make corrections to your equipment selection. Ask about trying a different type of machine or mask if you have ongoing problems. Make sure that your mask is a good fit and learn to use your equipment properly.    5. Challenge  Tell a family member or close friend to ask you each morning if you used your CPAP the previous night. Have someone to challenge you to give it your best effort.    6. Connection   Your adjustment to CPAP will be easier if you are able to connect with others who use the same treatment. Ask your sleep doctor if there is a support group in your area for people who have sleep apnea, or look for one on the Internet.  7. Comfort   Increase your level of comfort by using a saline spray, decongestant or heated humidifier if CPAP irritates your nose, mouth or throat. Use your unit's \"ramp\" setting to slowly get used to the air pressure level. There may be soft pads you can buy that will fit over your mask straps. Look on www.CPAP.com for accessories that can help make CPAP use more comfortable.  8. Cleaning   Clean your mask, tubing and headgear on a regular basis. Put this time in your schedule so that you don't forget to do it. Check and replace the filters for your CPAP unit and humidifier.    9. Completion   Although you are never finished with CPAP therapy, you should reward yourself by celebrating the completion of your first month of treatment. Expect this first month to be your hardest period of " adjustment. It will involve some trial and error as you find the machine, mask and pressure settings that are right for you.    10. Continuation  After your first month of treatment, continue to make a daily commitment to use your CPAP all night, every night and for every nap.    CPAP-Tips to starting with success:  Begin using your CPAP for short periods of time during the day while you watch TV or read.    Use CPAP every night and for every nap. Using it less often reduces the health benefits and makes it harder for your body to get used to it.    Make small adjustments to your mask, tubing, straps and headgear until you get the right fit. Tightening the mask may actually worsen the leak.  If it leaves significant marks on your face or irritates the bridge of your nose, it may not be the best mask for you.  Speak with the person who supplied the mask and consider trying other masks. Insurances will allow you to try different masks during the first month of starting CPAP.  Insurance also covers a new mask, hose and filter about every 6 months.    Use a saline nasal spray to ease mild nasal congestion. Neti-Pot or saline nasal rinses may also help. Nasal gel sprays can help reduce nasal dryness.  Biotene mouthwash can be helpful to protect your teeth if you experience frequent dry mouth.  Dry mouth may be a sign of air escaping out of your mouth or out of the mask in the case of a full face mask.  Speak with your provider if you expect that is the case.     Take a nasal decongestant to relieve more severe nasal or sinus congestion.  Do not use Afrin (oxymetazoline) nasal spray more than 3 days in a row.  Speak with your sleep doctor if your nasal congestion is chronic.    Use a heated humidifier that fits your CPAP model to enhance your breathing comfort. Adjust the heat setting up if you get a dry nose or throat, down if you get condensation in the hose or mask.  Position the CPAP lower than you so that any  condensation in the hose drains back into the machine rather than towards the mask.    Try a system that uses nasal pillows if traditional masks give you problems.    Clean your mask, tubing and headgear once a week. Make sure the equipment dries fully.    Regularly check and replace the filters for your CPAP unit and humidifier.    Work closely with your sleep provider and your CPAP supplier to make sure that you have the machine, mask and air pressure setting that works best for you. It is better to stop using it and call your provider to solve problems than to lay awake all night frustrated with the device.    BESIDES CPAP, WHAT OTHER THERAPIES ARE THERE?      Positioning Device  Positioning devices are generally used when sleep apnea is mild and only occurs on your back.This example shows a pillow that straps around the waist. It may be appropriate for those whose sleep study shows milder sleep apnea that occurs primarily when lying flat on one's back. Preliminary studies have shown benefit but effectiveness at home may need to be verified by a home sleep test. These devices are generally not covered by medical insurance.                      Oral Appliance  What is oral appliance therapy?  An oral appliance is a small acrylic device that fits over the upper and lower teeth or tongue (similar to an orthodontic retainer or a mouth guard). This device slightly advances the lower jaw or tongue, which moves the base of the tongue forward, opens the airway, improves breathing and can effectively treat snoring and obstructive sleep apnea sleep apnea. The appliance is fabricated and customized by a qualified dentist with experience in treating snoring and sleep apnea. Oral appliances are usually well tolerated and have relatively high compliance by patients1, 2, 3.  When is an oral appliance indicated?  Oral appliance therapy is recommended as a first-line treatment for patients with primary snoring, mild sleep apnea,  and for patients with moderate sleep apnea who prefer appliance therapy to use of CPAP4, 5. Severity of sleep apnea is determined by sleep testing and is based on the number of respiratory events per hour of sleep.   How successful is oral appliance therapy?  The success rate of oral appliance therapy in patients with mild sleep apnea is 75-80% while in patients with moderate sleep apnea it is 50-70%. The chance of success in patients with severe sleep apnea is 40-50%. The research also shows that oral appliances have a beneficial effect on the cardiovascular health of BIJU patients at the same magnitude as CPAP therapy7.  Oral appliances should be a second-line treatment in cases of severe sleep apnea, but if not completely successful then a combination therapy utilizing CPAP plus oral appliance therapy may be effective. Oral appliances tend to be effective in a broad range of patients although studies show that the patients who have the highest success are females, younger patients, those with milder disease, and less severe obesity. 3, 6.   The chances of success are lower in patients who have more severe BIJU, are older, and those who are morbidly obese.     Example of an oral appliance   Finding a dentist that practices dental sleep medicine  Specific training is available through the American Academy of Dental Sleep Medicine for dentists interested in working in the field of sleep. To find a dentist who is educated in the field of sleep and the use of oral appliances, near you, visit the Web site of the American Academy of Dental Sleep Medicine; also see   http://www.accpstorage.org/newOrganization/patients/oralAppliances.pdf  To search for a dentist certified in these practices:  Http://aadsm.org/FindADentist.aspx?1  1. Alex et al. Objectively measured vs self-reported compliance during oral appliance therapy for sleep-disordered breathing. Chest 2013; 144(5): 9682-4736.  2. Marcelo et al. Objective  measurement of compliance during oral appliance therapy for sleep-disordered breathing. Thorax 2013; 68(1): 91-96.  3. Kinza et al. Mandibular advancement devices in 620 men and women with BIJU and snoring: tolerability and predictors of treatment success. Chest 2004; 125: 8546-7863.  4. Cassie et al. Oral appliances for snoring and BIJU: a review. Sleep 2006; 29: 244-262.  5. Roby et al. Oral appliance treatment for BIJU: an update. J Clin Sleep Med 2014; 10(2): 215-227.  6. Fransico et al. Predictors of OSAH treatment outcome. J Dent Res 2007; 86: 8619-4130.    Nasal Valves                 Nasal valves may not be effective if you have frequent nasal congestion or have difficulty breathing through your nose. They may be an option for mild apnea if other options are not well tolerated. The efficacy of these devices is generally less than CPAP or oral appliances.      Weight Loss:    Weight management is a personal decision.  If you are interested in exploring weight loss strategies, the following discussion covers the impact on weight loss on sleep apnea and the approaches that may be successful.    Weight loss decreases severity of sleep apnea in most people with obesity. For those with mild obesity who have developed snoring with weight gain, even 15-30 pound weight loss can improve and occasionally eliminate sleep apnea.  Structured and life-long dietary and health habits are necessary to lose weight and keep healthier weight levels.     Though there may be significant health benefits from weight loss, long-term weight loss is very difficult to achieve- studies show success with dietary management in less than 10% of people. In addition, substantial weight loss may require years of dietary control and may be difficult if patients have severe obesity. In these cases, surgical management may be considered.  Finally, older individuals who have tolerated obesity without health complications may be less  likely to benefit from weight loss strategies.    Your BMI is Body mass index is 28.87 kg/(m^2).  Body mass index (BMI) is one way to tell whether you are at a healthy weight, overweight, or obese. It measures your weight in relation to your height.  A BMI of 18.5 to 24.9 is in the healthy range. A person with a BMI of 25 to 29.9 is considered overweight, and someone with a BMI of 30 or greater is considered obese. More than two-thirds of American adults are considered overweight or obese.  Being overweight or obese increases the risk for further weight gain. Excess weight may lead to heart disease and diabetes.  Creating and following plans for healthy eating and physical activity may help you improve your health.  Weight control is part of healthy lifestyle and includes exercise, emotional health, and healthy eating habits. Careful eating habits lifelong are the mainstay of weight control. Though there are significant health benefits from weight loss, long-term weight loss with diet alone may be very difficult to achieve- studies show long-term success with dietary management in less than 10% of people. Attaining a healthy weight may be especially difficult to achieve in those with severe obesity. In some cases, medications, devices and surgical management might be considered.  What can you do?  If you are overweight or obese and are interested in methods for weight loss, you should discuss this with your provider.     Consider reducing daily calorie intake by 500 calories.     Keep a food journal.     Avoiding skipping meals, consider cutting portions instead.    Diet combined with exercise helps maintain muscle while optimizing fat loss. Strength training is particularly important for building and maintaining muscle mass. Exercise helps reduce stress, increase energy, and improves fitness. Increasing exercise without diet control, however, may not burn enough calories to loose weight.       Start walking three  days a week 10-20 minutes at a time    Work towards walking thirty minutes five days a week     Eventually, increase the speed of your walking for 1-2 minutes at time    In addition, we recommend that you review healthy lifestyles and methods for weight loss available through the National Institutes of Health patient information sites:  http://win.niddk.nih.gov/publications/index.htm    And look into health and wellness programs that may be available through your health insurance provider, employer, local community center, or timmy club.    Weight management plan: Patient was referred to their PCP to discuss a diet and exercise plan.    Surgery:    Upper Airway Surgery for BIJU  Surgery for BIJU is a second-line treatment option in the management of sleep apnea.  Surgery should be considered for patients who are having a difficult time tolerating CPAP.    Surgery for BIJU is directed at areas that are responsible for narrowing or complete obstruction of the airway during sleep.  There are a wide range of procedures available to enlarge and/or stabilize the airway to prevent blockage of breathing in the three major areas where it can occur: the palate, tongue, and nasal regions.  Successful surgical treatment depends on the accurate identification of the factors responsible for obstructive sleep apnea in each person.  A personalized approach is required because there is no single treatment that works well for everyone.  Because of anatomic variation, consultation with an examination by a sleep surgeon is a critical first step in determining what surgical options are best for each patient.  In some cases, examination during sedation may be recommended in order to guide the selection of procedures.  Patients will be counseled about risks and benefits as well as the typical recovery course after surgery. Surgery is typically not a cure for a person s BIJU.  However, surgery will often significantly improve one s BIJU  severity (termed  success rate ).  Even in the absence of a cure, surgery will decrease the cardiovascular risk associated with OSA7; improve overall quality of life8 (sleepiness, functionality, sleep quality, etc).          Palate Procedures:  Patients with BIJU often have narrowing of their airway in the region of their tonsils and uvula.  The goals of palate procedures are to widen the airway in this region as well as to help the tissues resist collapse.  Modern palate procedure techniques focus on tissue conservation and soft tissue rearrangement, rather than tissue removal.  Often the uvula is preserved in this procedure. Residual sleep apnea is common in patient after pharyngoplasty with an average reduction in sleep apnea events of 33%2.      Tongue Procedures:  While patients are awake, the muscles that surround the throat are active and keep this region open for breathing. These muscles relax during sleep, allowing the tongue and other structures to collapse and block breathing.  There are several different tongue procedures available.  Selection of a tongue base procedure depends on characteristics seen on physical exam.  Generally, procedures are aimed at removing bulky tissues in this area or preventing the back of the tongue from falling back during sleep.  Success rates for tongue surgery range from 50-62%3.    Hypoglossal Nerve Stimulation:  Hypoglossal nerve stimulation has recently received approval from the [place] Food and Drug Administration for the treatment of obstructive sleep apnea.  This is based on research showing that the system was safe and effective in treating sleep apnea6.  Results showed that the median AHI score decreased 68%, from 29.3 to 9.0. This therapy uses an implant system that senses breathing patterns and delivers mild stimulation to airway muscles, which keeps the airway open during sleep.  The system consists of three fully implanted components: a small generator  (similar in size to a pacemaker), a breathing sensor, and a stimulation lead.  Using a small handheld remote, a patient turns the therapy on before bed and off upon awakening.    Candidates for this device must be greater than 22 years of age, have moderate to severe BIJU (AHI between 20-65), BMI less than 32, have tried CPAP/oral appliance without success, and have appropriate upper airway anatomy (determined by a sleep endoscopy performed by Dr. Cavazos).    Hypoglossal Nerve Stimulation Pathway:    The sleep surgeon s office will work with the patient through the insurance prior-authorization process (including communications and appeals).    Nasal Procedures:  Nasal obstruction can interfere with nasal breathing during the day and night.  Studies have shown that relief of nasal obstruction can improve the ability of some patients to tolerate positive airway pressure therapy for obstructive sleep apnea1.  Treatment options include medications such as nasal saline, topical corticosteroid and antihistamine sprays, and oral medications such as antihistamines or decongestants. Non-surgical treatments can include external nasal dilators for selected patients. If these are not successful by themselves, surgery can improve the nasal airway either alone or in combination with these other options.      Combination Procedures:  Combination of surgical procedures and other treatments may be recommended, particularly if patients have more than one area of narrowing or persistent positional disease.  The success rate of combination surgery ranges from 66-80%2,3.      1. Ernestina PANG. The Role of the Nose in Snoring and Obstructive Sleep Apnoea: An Update.  Eur Arch Otorhinolaryngol. 2011; 268: 1365-73.  2.  Danica SM; Jose Enrique JA; Valerie JR; Pallanch JF; Josh NIXON; Tess RODRIGUEZ; Adelia OTT. Surgical modifications of the upper airway for obstructive sleep apnea in adults: a systematic review and meta-analysis. SLEEP  2010;33(10):6080-1267. Chaim CEVALLOS. Hypopharyngeal surgery in obstructive sleep apnea: an evidence-based medicine review.  Arch Otolaryngol Head Neck Surg. 2006 Feb;132(2):206-13.  3. Juanito ZAVALA, Zev Y, Ventura ALONZO. The efficacy of anatomically based multilevel surgery for obstructive sleep apnea. Otolaryngol Head Neck Surg. 2003 Oct;129(4):327-35.  4. Kezirian E, Goldberg A. Hypopharyngeal Surgery in Obstructive Sleep Apnea: An Evidence-Based Medicine Review. Arch Otolaryngol Head Neck Surg. 2006 Feb;132(2):206-13.  5. Hanane HANNA et al. Upper-Airway Stimulation for Obstructive Sleep Apnea.  N Engl J Med. 2014 Jan 9;370(2):139-49.  6. Abdelrahman Y et al. Increased Incidence of Cardiovascular Disease in Middle-aged Men with Obstructive Sleep Apnea. Am J Respir Crit Care Med; 2002 166: 159-165  7. Estella EM et al. Studying Life Effects and Effectiveness of Palatopharyngoplasty (SLEEP) study: Subjective Outcomes of Isolated Uvulopalatopharyngoplasty. Otolaryngol Head Neck Surg. 2011; 144: 623-631.  8.   Your blood pressure was checked while you were in clinic today.  Please read the guidelines below about what these numbers mean and what you should do about them.  Your systolic blood pressure is the top number.  This is the pressure when the heart is pumping.  Your diastolic blood pressure is the bottom number.  This is the pressure in between beats.  If your systolic blood pressure is less than 120 and your diastolic blood pressure is less than 80, then your blood pressure is normal. There is nothing more that you need to do about it  If your systolic blood pressure is 120-139 or your diastolic blood pressure is 80-89, your blood pressure may be higher than it should be.  You should have your blood pressure re-checked within a year by a primary care provider.  If your systolic blood pressure is 140 or greater or your diastolic blood pressure is 90 or greater, you may have high blood pressure.  High blood pressure is treatable,  but if left untreated over time it can put you at risk for heart attack, stroke, or kidney failure.  You should have your blood pressure re-checked by a primary care provider within the next four weeks.

## 2018-09-05 NOTE — MR AVS SNAPSHOT
"              After Visit Summary   9/5/2018    Jenna Card    MRN: [id number]           Patient Information     Date Of Birth          1955        Visit Information        Provider Department      9/5/2018 8:00 AM Reinier العراقي MD [facility] [place]        Today's Diagnoses     Sleep disturbance    -  1    Snoring        Overweight          Care Instructions    MY TREATMENT INFORMATION FOR SLEEP DISTURBANCE-  Jenna Card    DOCTOR : Reinier العراقي MD  SLEEP CENTER :  [place]    MY CONTACT NUMBER:[phone]        If I haven't had a sleep study yet, what can I expect?  A personal story from [employer]  https://www.MailPix.com/watch?v=AxPLmlRpnCs        Suspected sleep apnea: Sleep study ordered.    Follow up in sleep clinic 1-2 weeks after sleep study to discuss results of sleep study and treatment options.    Patient was advised not to drive if drowsy or sleepy.    Frequently asked questions:  1. What is Obstructive Sleep Apnea (BIJU)? BIJU is the most common type of sleep apnea. Apnea literally means, \"without breath.\" It is characterized by repetitive pauses in breathing, despite continued effort to breathe, and is usually associated with a reduction in blood oxygen saturation. Apneas can last 10 to over 60 seconds. It is caused by narrowing or collapse of the upper airway as muscles relax during sleep. Severity of sleep apnea is determined by frequency of breathing events and their effect on your sleep and oxygen levels determined during sleep testing.   2. What are the consequences of BIJU? Symptoms include: daytime sleepiness- possibly increasing the risk of falling asleep while driving, unrefreshing/restless sleep, snoring, insomnia, waking frequently to urinate, waking with heartburn or reflux, reduced concentration and memory, and morning headaches. Other health consequences may include development of high blood pressure and other cardiovascular disease in persons who " are susceptible. Untreated BIJU  can contribute to heart disease, stroke and diabetes.   3. What are the treatment options? In most situations, sleep apnea is a lifelong disease that must be managed with daily therapy. Medications are not effective for sleep apnea and surgery is generally not performed until other therapies have been tried. Therapy is usually tailored to the individual patient based on many factors including your wishes as well as severity of sleep apnea and severity of obesity. Continuous Positive Airway (CPAP) is the most reliable treatment. An oral device to hold your jaw forward is usually the next most reliable option. Other options include postioning devices (to keep you off your back), weight loss, and surgery including a tongue pacing device. There is more detail about some of these options below.    Central sleep apnea is a disorder in which your breathing repeatedly stops and starts during sleep.  Central sleep apnea occurs because your brain doesn't send proper signals to the muscles that control your breathing. This condition is different from obstructive sleep apnea, in which you can't breathe normally because of upper airway obstruction. Central sleep apnea is less common than obstructive sleep apnea.  Central sleep apnea may occur as a result of other conditions, such as heart failure and stroke. Sleeping at a high altitude and pain medications also may cause central sleep apnea.        1. CPAP-  WHAT DOES IT DO AND HOW CAN I LEARN TO WEAR IT?                               BEFORE I START, CAN I WATCH A MOVIE TO GET A PLAN ON HOW TO USE CPAP?  https://www.Jack and Jakeâ€™s.com/watch?c=w7S92pa601V      Continuous positive airway pressure, or CPAP, is the most effective treatment for obstructive sleep apnea. It works by blowing room air, through a mask, to hold your throat open. A decision to use CPAP is a major step forward in the pursuit of a healthier life. The successful use of CPAP will help  "you breathe easier, sleep better and live healthier. You can choose CPAP equipment from any durable medical equipment provider that meets your needs.  Using CPAP can be a positive experience if you keep these hoskins points in mind:  1. Commitment  CPAP is not a quick fix for your problem. It involves a long-term commitment to improve your sleep and your health.    2. Communication  Stay in close communication with both your sleep doctor and your CPAP supplier. Ask lots of questions and seek help when you need it.    3. Consistency  Use CPAP all night, every night and for every nap. You will receive the maximum health benefits from CPAP when you use it every time that you sleep. This will also make it easier for your body to adjust to the treatment.    4. Correction  The first machine and mask that you try may not be the best ones for you. Work with your sleep doctor and your CPAP supplier to make corrections to your equipment selection. Ask about trying a different type of machine or mask if you have ongoing problems. Make sure that your mask is a good fit and learn to use your equipment properly.    5. Challenge  Tell a family member or close friend to ask you each morning if you used your CPAP the previous night. Have someone to challenge you to give it your best effort.    6. Connection   Your adjustment to CPAP will be easier if you are able to connect with others who use the same treatment. Ask your sleep doctor if there is a support group in your area for people who have sleep apnea, or look for one on the Internet.  7. Comfort   Increase your level of comfort by using a saline spray, decongestant or heated humidifier if CPAP irritates your nose, mouth or throat. Use your unit's \"ramp\" setting to slowly get used to the air pressure level. There may be soft pads you can buy that will fit over your mask straps. Look on www.CPAP.com for accessories that can help make CPAP use more comfortable.  8. Cleaning   Clean " your mask, tubing and headgear on a regular basis. Put this time in your schedule so that you don't forget to do it. Check and replace the filters for your CPAP unit and humidifier.    9. Completion   Although you are never finished with CPAP therapy, you should reward yourself by celebrating the completion of your first month of treatment. Expect this first month to be your hardest period of adjustment. It will involve some trial and error as you find the machine, mask and pressure settings that are right for you.    10. Continuation  After your first month of treatment, continue to make a daily commitment to use your CPAP all night, every night and for every nap.    CPAP-Tips to starting with success:  Begin using your CPAP for short periods of time during the day while you watch TV or read.    Use CPAP every night and for every nap. Using it less often reduces the health benefits and makes it harder for your body to get used to it.    Make small adjustments to your mask, tubing, straps and headgear until you get the right fit. Tightening the mask may actually worsen the leak.  If it leaves significant marks on your face or irritates the bridge of your nose, it may not be the best mask for you.  Speak with the person who supplied the mask and consider trying other masks. Insurances will allow you to try different masks during the first month of starting CPAP.  Insurance also covers a new mask, hose and filter about every 6 months.    Use a saline nasal spray to ease mild nasal congestion. Neti-Pot or saline nasal rinses may also help. Nasal gel sprays can help reduce nasal dryness.  Biotene mouthwash can be helpful to protect your teeth if you experience frequent dry mouth.  Dry mouth may be a sign of air escaping out of your mouth or out of the mask in the case of a full face mask.  Speak with your provider if you expect that is the case.     Take a nasal decongestant to relieve more severe nasal or sinus  congestion.  Do not use Afrin (oxymetazoline) nasal spray more than 3 days in a row.  Speak with your sleep doctor if your nasal congestion is chronic.    Use a heated humidifier that fits your CPAP model to enhance your breathing comfort. Adjust the heat setting up if you get a dry nose or throat, down if you get condensation in the hose or mask.  Position the CPAP lower than you so that any condensation in the hose drains back into the machine rather than towards the mask.    Try a system that uses nasal pillows if traditional masks give you problems.    Clean your mask, tubing and headgear once a week. Make sure the equipment dries fully.    Regularly check and replace the filters for your CPAP unit and humidifier.    Work closely with your sleep provider and your CPAP supplier to make sure that you have the machine, mask and air pressure setting that works best for you. It is better to stop using it and call your provider to solve problems than to lay awake all night frustrated with the device.    BESIDES CPAP, WHAT OTHER THERAPIES ARE THERE?      Positioning Device  Positioning devices are generally used when sleep apnea is mild and only occurs on your back.This example shows a pillow that straps around the waist. It may be appropriate for those whose sleep study shows milder sleep apnea that occurs primarily when lying flat on one's back. Preliminary studies have shown benefit but effectiveness at home may need to be verified by a home sleep test. These devices are generally not covered by medical insurance.                      Oral Appliance  What is oral appliance therapy?  An oral appliance is a small acrylic device that fits over the upper and lower teeth or tongue (similar to an orthodontic retainer or a mouth guard). This device slightly advances the lower jaw or tongue, which moves the base of the tongue forward, opens the airway, improves breathing and can effectively treat snoring and obstructive  sleep apnea sleep apnea. The appliance is fabricated and customized by a qualified dentist with experience in treating snoring and sleep apnea. Oral appliances are usually well tolerated and have relatively high compliance by patients1, 2, 3.  When is an oral appliance indicated?  Oral appliance therapy is recommended as a first-line treatment for patients with primary snoring, mild sleep apnea, and for patients with moderate sleep apnea who prefer appliance therapy to use of CPAP4, 5. Severity of sleep apnea is determined by sleep testing and is based on the number of respiratory events per hour of sleep.   How successful is oral appliance therapy?  The success rate of oral appliance therapy in patients with mild sleep apnea is 75-80% while in patients with moderate sleep apnea it is 50-70%. The chance of success in patients with severe sleep apnea is 40-50%. The research also shows that oral appliances have a beneficial effect on the cardiovascular health of BIJU patients at the same magnitude as CPAP therapy7.  Oral appliances should be a second-line treatment in cases of severe sleep apnea, but if not completely successful then a combination therapy utilizing CPAP plus oral appliance therapy may be effective. Oral appliances tend to be effective in a broad range of patients although studies show that the patients who have the highest success are females, younger patients, those with milder disease, and less severe obesity. 3, 6.   The chances of success are lower in patients who have more severe BIJU, are older, and those who are morbidly obese.     Example of an oral appliance   Finding a dentist that practices dental sleep medicine  Specific training is available through the American Academy of Dental Sleep Medicine for dentists interested in working in the field of sleep. To find a dentist who is educated in the field of sleep and the use of oral appliances, near you, visit the Web site of the American Academy  of Dental Sleep Medicine; also see   http://www.accpstorage.org/newOrganization/patients/oralAppliances.pdf  To search for a dentist certified in these practices:  Http://aadsm.org/FindADentist.aspx?1  1. Alex et al. Objectively measured vs self-reported compliance during oral appliance therapy for sleep-disordered breathing. Chest 2013; 144(5): 1408-8853.  2. Marcelo et al. Objective measurement of compliance during oral appliance therapy for sleep-disordered breathing. Thorax 2013; 68(1): 91-96.  3. Kinza et al. Mandibular advancement devices in 620 men and women with BIJU and snoring: tolerability and predictors of treatment success. Chest 2004; 125: 4859-3210.  4. Cassie et al. Oral appliances for snoring and BIJU: a review. Sleep 2006; 29: 244-262.  5. Roby, et al. Oral appliance treatment for BIJU: an update. J Clin Sleep Med 2014; 10(2): 215-227.  6. Fransico et al. Predictors of OSAH treatment outcome. J Dent Res 2007; 86: 4393-7938.    Nasal Valves                 Nasal valves may not be effective if you have frequent nasal congestion or have difficulty breathing through your nose. They may be an option for mild apnea if other options are not well tolerated. The efficacy of these devices is generally less than CPAP or oral appliances.      Weight Loss:    Weight management is a personal decision.  If you are interested in exploring weight loss strategies, the following discussion covers the impact on weight loss on sleep apnea and the approaches that may be successful.    Weight loss decreases severity of sleep apnea in most people with obesity. For those with mild obesity who have developed snoring with weight gain, even 15-30 pound weight loss can improve and occasionally eliminate sleep apnea.  Structured and life-long dietary and health habits are necessary to lose weight and keep healthier weight levels.     Though there may be significant health benefits from weight loss,  long-term weight loss is very difficult to achieve- studies show success with dietary management in less than 10% of people. In addition, substantial weight loss may require years of dietary control and may be difficult if patients have severe obesity. In these cases, surgical management may be considered.  Finally, older individuals who have tolerated obesity without health complications may be less likely to benefit from weight loss strategies.    Your BMI is Body mass index is 28.87 kg/(m^2).  Body mass index (BMI) is one way to tell whether you are at a healthy weight, overweight, or obese. It measures your weight in relation to your height.  A BMI of 18.5 to 24.9 is in the healthy range. A person with a BMI of 25 to 29.9 is considered overweight, and someone with a BMI of 30 or greater is considered obese. More than two-thirds of American adults are considered overweight or obese.  Being overweight or obese increases the risk for further weight gain. Excess weight may lead to heart disease and diabetes.  Creating and following plans for healthy eating and physical activity may help you improve your health.  Weight control is part of healthy lifestyle and includes exercise, emotional health, and healthy eating habits. Careful eating habits lifelong are the mainstay of weight control. Though there are significant health benefits from weight loss, long-term weight loss with diet alone may be very difficult to achieve- studies show long-term success with dietary management in less than 10% of people. Attaining a healthy weight may be especially difficult to achieve in those with severe obesity. In some cases, medications, devices and surgical management might be considered.  What can you do?  If you are overweight or obese and are interested in methods for weight loss, you should discuss this with your provider.     Consider reducing daily calorie intake by 500 calories.     Keep a food journal.     Avoiding  skipping meals, consider cutting portions instead.    Diet combined with exercise helps maintain muscle while optimizing fat loss. Strength training is particularly important for building and maintaining muscle mass. Exercise helps reduce stress, increase energy, and improves fitness. Increasing exercise without diet control, however, may not burn enough calories to loose weight.       Start walking three days a week 10-20 minutes at a time    Work towards walking thirty minutes five days a week     Eventually, increase the speed of your walking for 1-2 minutes at time    In addition, we recommend that you review healthy lifestyles and methods for weight loss available through the National Institutes of Health patient information sites:  http://win.niddk.nih.gov/publications/index.htm    And look into health and wellness programs that may be available through your health insurance provider, employer, local community center, or timmy club.    Weight management plan: Patient was referred to their PCP to discuss a diet and exercise plan.    Surgery:    Upper Airway Surgery for BIJU  Surgery for BIJU is a second-line treatment option in the management of sleep apnea.  Surgery should be considered for patients who are having a difficult time tolerating CPAP.    Surgery for BIJU is directed at areas that are responsible for narrowing or complete obstruction of the airway during sleep.  There are a wide range of procedures available to enlarge and/or stabilize the airway to prevent blockage of breathing in the three major areas where it can occur: the palate, tongue, and nasal regions.  Successful surgical treatment depends on the accurate identification of the factors responsible for obstructive sleep apnea in each person.  A personalized approach is required because there is no single treatment that works well for everyone.  Because of anatomic variation, consultation with an examination by a sleep surgeon is a critical  first step in determining what surgical options are best for each patient.  In some cases, examination during sedation may be recommended in order to guide the selection of procedures.  Patients will be counseled about risks and benefits as well as the typical recovery course after surgery. Surgery is typically not a cure for a person s BIJU.  However, surgery will often significantly improve one s BIJU severity (termed  success rate ).  Even in the absence of a cure, surgery will decrease the cardiovascular risk associated with OSA7; improve overall quality of life8 (sleepiness, functionality, sleep quality, etc).          Palate Procedures:  Patients with BIJU often have narrowing of their airway in the region of their tonsils and uvula.  The goals of palate procedures are to widen the airway in this region as well as to help the tissues resist collapse.  Modern palate procedure techniques focus on tissue conservation and soft tissue rearrangement, rather than tissue removal.  Often the uvula is preserved in this procedure. Residual sleep apnea is common in patient after pharyngoplasty with an average reduction in sleep apnea events of 33%2.      Tongue Procedures:  While patients are awake, the muscles that surround the throat are active and keep this region open for breathing. These muscles relax during sleep, allowing the tongue and other structures to collapse and block breathing.  There are several different tongue procedures available.  Selection of a tongue base procedure depends on characteristics seen on physical exam.  Generally, procedures are aimed at removing bulky tissues in this area or preventing the back of the tongue from falling back during sleep.  Success rates for tongue surgery range from 50-62%3.    Hypoglossal Nerve Stimulation:  Hypoglossal nerve stimulation has recently received approval from the [place] Food and Drug Administration for the treatment of obstructive sleep apnea.  This  is based on research showing that the system was safe and effective in treating sleep apnea6.  Results showed that the median AHI score decreased 68%, from 29.3 to 9.0. This therapy uses an implant system that senses breathing patterns and delivers mild stimulation to airway muscles, which keeps the airway open during sleep.  The system consists of three fully implanted components: a small generator (similar in size to a pacemaker), a breathing sensor, and a stimulation lead.  Using a small handheld remote, a patient turns the therapy on before bed and off upon awakening.    Candidates for this device must be greater than 22 years of age, have moderate to severe BIJU (AHI between 20-65), BMI less than 32, have tried CPAP/oral appliance without success, and have appropriate upper airway anatomy (determined by a sleep endoscopy performed by Dr. Cavazos).    Hypoglossal Nerve Stimulation Pathway:    The sleep surgeon s office will work with the patient through the insurance prior-authorization process (including communications and appeals).    Nasal Procedures:  Nasal obstruction can interfere with nasal breathing during the day and night.  Studies have shown that relief of nasal obstruction can improve the ability of some patients to tolerate positive airway pressure therapy for obstructive sleep apnea1.  Treatment options include medications such as nasal saline, topical corticosteroid and antihistamine sprays, and oral medications such as antihistamines or decongestants. Non-surgical treatments can include external nasal dilators for selected patients. If these are not successful by themselves, surgery can improve the nasal airway either alone or in combination with these other options.      Combination Procedures:  Combination of surgical procedures and other treatments may be recommended, particularly if patients have more than one area of narrowing or persistent positional disease.  The success rate of combination  surgery ranges from 66-80%2,3.      1. Ernestina PANG. The Role of the Nose in Snoring and Obstructive Sleep Apnoea: An Update.  Eur Arch Otorhinolaryngol. 2011; 268: 1365-73.  2.  Danica SM; Jsoe Enrique JA; Valerie JR; Pallanch JF; Josh MB; Tess SG; Adelia OTT. Surgical modifications of the upper airway for obstructive sleep apnea in adults: a systematic review and meta-analysis. SLEEP 2010;33(10):6952-8250. Chaim CEVALLOS. Hypopharyngeal surgery in obstructive sleep apnea: an evidence-based medicine review.  Arch Otolaryngol Head Neck Surg. 2006 Feb;132(2):206-13.  3. Juanito YH1, Zev Y, Ventura ALONZO. The efficacy of anatomically based multilevel surgery for obstructive sleep apnea. Otolaryngol Head Neck Surg. 2003 Oct;129(4):327-35.  4. Chaim CEVALLOS, Goldberg A. Hypopharyngeal Surgery in Obstructive Sleep Apnea: An Evidence-Based Medicine Review. Arch Otolaryngol Head Neck Surg. 2006 Feb;132(2):206-13.  5. Macko PJ et al. Upper-Airway Stimulation for Obstructive Sleep Apnea.  N Engl J Med. 2014 Jan 9;370(2):139-49.  6. Abdelrahman Y et al. Increased Incidence of Cardiovascular Disease in Middle-aged Men with Obstructive Sleep Apnea. Am J Respir Crit Care Med; 2002 166: 159-165  7. Soria EM et al. Studying Life Effects and Effectiveness of Palatopharyngoplasty (SLEEP) study: Subjective Outcomes of Isolated Uvulopalatopharyngoplasty. Otolaryngol Head Neck Surg. 2011; 144: 623-631.  8.   Your blood pressure was checked while you were in clinic today.  Please read the guidelines below about what these numbers mean and what you should do about them.  Your systolic blood pressure is the top number.  This is the pressure when the heart is pumping.  Your diastolic blood pressure is the bottom number.  This is the pressure in between beats.  If your systolic blood pressure is less than 120 and your diastolic blood pressure is less than 80, then your blood pressure is normal. There is nothing more that you need to do about it  If your  systolic blood pressure is 120-139 or your diastolic blood pressure is 80-89, your blood pressure may be higher than it should be.  You should have your blood pressure re-checked within a year by a primary care provider.  If your systolic blood pressure is 140 or greater or your diastolic blood pressure is 90 or greater, you may have high blood pressure.  High blood pressure is treatable, but if left untreated over time it can put you at risk for heart attack, stroke, or kidney failure.  You should have your blood pressure re-checked by a primary care provider within the next four weeks.              Follow-ups after your visit        Your next 10 appointments already scheduled     Sep 11, 2018 12:45 PM CDT   Ech Complete with [id number]   [facility] CV Echocardiography (Cardiovascular Imaging at [facility])    [address] [place] [id number] [phone]           1.  Please bring or wear a comfortable two-piece outfit. 2.  You may eat, drink and take your normal medicines. 3.  For any questions that cannot be answered, please contact the ordering physician 4.  Please do not wear perfumes or scented lotions on the day of your exam.              Future tests that were ordered for you today     Open Future Orders        Priority Expected Expires Ordered    Comprehensive Sleep Study Routine  3/4/2019 9/5/2018            Who to contact     If you have questions or need follow up information about today's clinic visit or your schedule please contact [facility] directly at [phone].  Normal or non-critical lab and imaging results will be communicated to you by MyChart, letter or phone within 4 business days after the clinic has received the results. If you do not hear from us within 7 days, please contact the clinic through MyChart or phone. If you have a critical or abnormal lab result, we will notify you by phone as soon as possible.  Submit  "refill requests through [employer] or call your pharmacy and they will forward the refill request to us. Please allow 3 business days for your refill to be completed.          Additional Information About Your Visit        [employer] Information     [employer] gives you secure access to your electronic health record. If you see a primary care provider, you can also send messages to your care team and make appointments. If you have questions, please call your primary care clinic.  If you do not have a primary care provider, please call [phone] and they will assist you.        Care EveryWhere ID     This is your Care EveryWhere ID. This could be used by other organizations to access your [facility] medical records  [id number]        Your Vitals Were     Pulse Height Pulse Oximetry BMI (Body Mass Index)          57 1.715 m (5' 7.52\") 97% 28.87 kg/m2         Blood Pressure from Last 3 Encounters:   09/05/18 150/68   08/29/18 138/88   07/16/18 132/78    Weight from Last 3 Encounters:   09/05/18 84.9 kg (187 lb 3.2 oz)   08/29/18 86.2 kg (190 lb 1.6 oz)   07/16/18 87.1 kg (192 lb)              We Performed the Following     SLEEP EVALUATION & MANAGEMENT REFERRAL - ADULT -[facility]  [phone] (Age 18 and up)          Today's Medication Changes          These changes are accurate as of 9/5/18  8:57 AM.  If you have any questions, ask your nurse or doctor.               Start taking these medicines.        Dose/Directions    zolpidem 5 MG tablet   Commonly known as:  AMBIEN   Used for:  Sleep disturbance   Started by:  Reinier العراقي MD        Take tablet by mouth 15 minutes prior to sleep, for Sleep Study   Quantity:  1 tablet   Refills:  0         These medicines have changed or have updated prescriptions.        Dose/Directions    albuterol 108 (90 Base) MCG/ACT inhaler   Commonly known as:  PROAIR HFA/PROVENTIL HFA/VENTOLIN HFA   This may have changed:  when to take this   Used for:  Acute " bronchitis with symptoms > 10 days        Dose:  2 puff   Inhale 2 puffs into the lungs every 6 hours as needed for shortness of breath / dyspnea or wheezing   Quantity:  1 Inhaler   Refills:  0       fluticasone 110 MCG/ACT Inhaler   Commonly known as:  FLOVENT HFA   This may have changed:    - when to take this  - reasons to take this   Used for:  Acute bronchitis with symptoms > 10 days        Dose:  2 puff   Inhale 2 puffs into the lungs 2 times daily   Quantity:  1 Inhaler   Refills:  1       triamcinolone 55 MCG/ACT inhaler   Commonly known as:  NASACORT AQ   This may have changed:    - when to take this  - reasons to take this   Used for:  Post-nasal drainage        Dose:  2 spray   Spray 2 sprays into both nostrils daily   Quantity:  1 Bottle   Refills:  0            Where to get your medicines      Some of these will need a paper prescription and others can be bought over the counter.  Ask your nurse if you have questions.     Bring a paper prescription for each of these medications     zolpidem 5 MG tablet                Primary Care Provider Office Phone # Fax #    Wilmar Boyer [phone]       [address] [place] [id number]        Equal Access to Services     [facility]: Hadii ariela walden Soadriana, waaxda luqadaha, qaybta kaalmada aderomelia, millicent guerin . So [facility] [phone].    ATENCIÓN: Si habla español, tiene a weiss disposición servicios gratuitos de asistencia lingüística. [facility] [phone].    We comply with applicable federal civil rights laws and [place] laws. We do not discriminate on the basis of race, color, national origin, age, disability, sex, sexual orientation, or gender identity.            Thank you!     Thank you for choosing [facility]  for your care. Our goal is always to provide you with excellent care. Hearing back from our patients is one way we can continue to improve our services. Please take  a few minutes to complete the written survey that you may receive in the mail after your visit with us. Thank you!             Your Updated Medication List - Protect others around you: Learn how to safely use, store and throw away your medicines at www.disposemymeds.org.          This list is accurate as of 9/5/18  8:57 AM.  Always use your most recent med list.                   Brand Name Dispense Instructions for use Diagnosis    albuterol 108 (90 Base) MCG/ACT inhaler    PROAIR HFA/PROVENTIL HFA/VENTOLIN HFA    1 Inhaler    Inhale 2 puffs into the lungs every 6 hours as needed for shortness of breath / dyspnea or wheezing    Acute bronchitis with symptoms > 10 days       allopurinol 300 MG tablet    ZYLOPRIM    30 tablet    TAKE ONE TABLET BY MOUTH ONE TIME DAILY    Chronic gout without tophus, unspecified cause, unspecified site       aspirin 81 MG tablet      Take 81 mg by mouth daily        atenolol 50 MG tablet    TENORMIN    60 tablet    TAKE TWO TABLETS BY MOUTH DAILY    Essential hypertension       atorvastatin 20 MG tablet    LIPITOR    90 tablet    Take 1 tablet (20 mg) by mouth daily    Coronary artery disease involving native coronary artery of native heart without angina pectoris       blood glucose monitoring lancets     1 Box    Use to test blood sugar one time daily or as directed.    Type 2 diabetes, HbA1c goal < 7% (H)       blood glucose monitoring meter device kit           blood glucose monitoring test strip    ACCU-CHEK MARSHALL PLUS    1 Box    Check blood sugars once daily    Type 2 diabetes mellitus without complication, without long-term current use of insulin (H)       fish oil-omega-3 fatty acids 1000 MG capsule      Take 1 capsule (1 g) by mouth daily    Hyperlipidemia LDL goal <100       fluticasone 110 MCG/ACT Inhaler    FLOVENT HFA    1 Inhaler    Inhale 2 puffs into the lungs 2 times daily    Acute bronchitis with symptoms > 10 days       hydrochlorothiazide 25 MG tablet     HYDRODIURIL    90 tablet    TAKE ONE TABLET BY MOUTH IN THE MORNING    Essential hypertension       levothyroxine 125 MCG tablet    SYNTHROID/LEVOTHROID    30 tablet    1/2 tab daily (total 62.5mcg per day)    Hypothyroidism, unspecified type       losartan 100 MG tablet    COZAAR    90 tablet    TAKE HALF TABLET BY MOUTH TWICE DAILY    Essential hypertension       metFORMIN 500 MG tablet    GLUCOPHAGE    180 tablet    TAKE ONE TABLET BY MOUTH TWICE DAILY WITH MEALS    Type 2 diabetes mellitus without complication, without long-term current use of insulin (H)       triamcinolone 55 MCG/ACT inhaler    NASACORT AQ    1 Bottle    Spray 2 sprays into both nostrils daily    Post-nasal drainage       zolpidem 5 MG tablet    AMBIEN    1 tablet    Take tablet by mouth 15 minutes prior to sleep, for Sleep Study    Sleep disturbance

## 2018-09-05 NOTE — PROGRESS NOTES
[facility]  Outpatient Sleep Medicine Consultation  September 5, 2018      Name: Jenna Card MRN# [id number]   Age: 62 year old YOB: 1955     Date of Consultation: September 5, 2018  Consultation is requested by: Wilmar Boyer MD  [address] [place] [address]  Primary care provider: Wilmar Boyer  [place] clinic: [facility]3]       Reason for Sleep Consult:     Jenna Card is a 62 year old female nightly snoring, gasping, choking, poor quality of sleep and excessive daytime sleepiness and tiredness.         Assessment and Plan:     Summary Sleep Diagnoses/Recommendations:    1. Sleep Disturbance:  High suspicion of sleep disordered breathing based on patient's symptoms (snoring, excessive daytime sleepiness and tiredness and waking up gasping air and choking), high BMI, neck circumference and oropharyngeal examination. Will schedule PSG with PAP titration in second half if patient meets criteria for BIJU in first half of PSG. We also discussed the pathophysiology of sleep disordered breathing and the importance of treating it if S/he should have it. Patient is advised not to drive if he/she feels drowsy or sleepy.  Follow up after sleep study to discuss the result of sleep study and treatment options.    2.  Overweight:  Counseled regarding weight loss through diet modification and increased physical activity. Patient was given instuctions of weight loss and advised to follow up her PCP for further weight loss interventions.        Orders Placed This Encounter   Procedures     Comprehensive Sleep Study    Ambien 5 mg x1    Summary Counseling:  See instructions    Counseling included a comprehensive review of diagnostic and therapeutic strategies as well as risks of inadequate therapy.  Educational materials provided in instructions.    All questions were answered.  The patient indicates understanding of the above issues and agrees with the plan set forth.            History of Present Illness:     Jenna Card is a 62 year old female with history of DM type II, hypertension, hyperlipidemia, hypothyroidism, obesity, gout and celiac disease who presents to the [facility] in [place] with complains of sleep disturbance. I was asked to see Ms. Card regarding sleep disturbance and snoring by Dr. Boyer.   Patient is preparing for pelvic prolapse surgery at the end of the year, and her primary care doctor recommended evaluation prior to surgery.  Patient complaints loud snoring, waking up gasping air and choking, dry mouth and throat, mouth breathing, difficult breathing through the nose, grinding her teeth, excessive daytime sleepiness and tiredness.  Patient denies morning headaches, acid reflux and occasional night sweats.  Patient had restless legs night related to aching during at work and NSAIDs and tylenol helps.  Patient works an evening shift, she goes to bed at 12:30-1:00 AM. She lives alone and no bed partner for long time. She denies falling asleep but awake 1-2 times a night for bathroom. When she drinks alcohol at night, her snoring is much .    Please see below for sleep ROS details.    PREVIOUS IN- LAB or HOME SLEEP STUDIES:  None     SLEEP-WAKE SCHEDULE:     Jenna Card     -Describes themself as a night person;      -ON WEEKDAYS and ON WEEKENDS, goes to sleep at 12:30-1:00 AM during the week; awakens  8:00 AM without an alarm; falls asleep in < 5 minutes; denies difficulty falling asleep.     -Awakens 1-2 times a night for < 5 minutes before falling back to sleep; awakens to go to the bathroom.      -Total sleep time: 7-8 hours per night.    -Naps 4-6 times/days per week for 15-20 minutes, feels refreshed after naps; takes no inadvertant naps.       BEDTIME ACTIVITIES AND SHIFT WORK:    Jenna Card    -does read in bed and does not use electronics in bed and watch TV in bed.     -does not do shift work.  He/she works evening shifts.       Occupation: Pharm Tech      SCALES       SLEEP APNEA: Stopbang score: 5       INSOMNIA:  Insomnia severity score: 7       SLEEPINESS: [place] sleepiness scale (ESS):  5   Drowsy driving/near accidents: No          PHQ9: N/A    SLEEP COMPLAINTS:   Snoring- 7 days/week  Witness apnea: No  Gasping/Choking: Yes  Excessive daytime sleep: Yes  Toss/turn: Yes  Excessive tiredness/fatigue:  Yes  Morning headaches: No/Yes  Dry mouth/throat: Yes  Mouth breather: Yes  Dyspnea: No  Coexisting Lung disease: No    Coexisting Heart disease: No    Does patient have a bed partner: No  Has bed partner been sleeping separately because of snoring:  N/A            RLS Screen: When you try to relax in the evening or sleep at  night, do you ever have unpleasant, restless feelings in your  legs that can be relieved by walking or movement? Yes    Periodic limb movement: No    Narcolepsy:       denies sudden urges of sleep attacks     denies cataplexy     denies sleep paralysis      denies hallucinations     Sleep Behaviors:     denies leg symptoms/movements     denies motor restlessness     denies night terrors     denies bruxism     denies automatic behaviors    Other subjective complaints:     denies anxiety or rumination      denies pain and discomfort at  night     denies waking up with heart pounding or racing     denies GERD/heartburn         Parasomnia:   NREM - denies recurrent persistent confusional arousal, night eating or sleep terrors. She had hx of sleep walking as youth.  REM  - denies dream enactment; no injuries.     Safety: None             Medications:     Current Outpatient Prescriptions   Medication Sig     albuterol (PROAIR HFA, PROVENTIL HFA, VENTOLIN HFA) 108 (90 BASE) MCG/ACT inhaler Inhale 2 puffs into the lungs every 6 hours as needed for shortness of breath / dyspnea or wheezing (Patient taking differently: Inhale 2 puffs into the lungs as needed for shortness of breath / dyspnea or wheezing )     allopurinol  (ZYLOPRIM) 300 MG tablet TAKE ONE TABLET BY MOUTH ONE TIME DAILY      aspirin 81 MG tablet Take 81 mg by mouth daily     atenolol (TENORMIN) 50 MG tablet TAKE TWO TABLETS BY MOUTH DAILY      atorvastatin (LIPITOR) 20 MG tablet Take 1 tablet (20 mg) by mouth daily     blood glucose monitoring (ACCU-CHEK MARSHALL PLUS) meter device kit      blood glucose monitoring (ACCU-CHEK MARSHALL PLUS) test strip Check blood sugars once daily     blood glucose monitoring (ACCU-CHEK MULTICLIX) lancets Use to test blood sugar one time daily or as directed.     fluticasone (FLOVENT HFA) 110 MCG/ACT inhaler Inhale 2 puffs into the lungs 2 times daily (Patient taking differently: Inhale 2 puffs into the lungs as needed )     hydrochlorothiazide (HYDRODIURIL) 25 MG tablet TAKE ONE TABLET BY MOUTH IN THE MORNING      levothyroxine (SYNTHROID/LEVOTHROID) 125 MCG tablet 1/2 tab daily (total 62.5mcg per day)     losartan (COZAAR) 100 MG tablet TAKE HALF TABLET BY MOUTH TWICE DAILY      metFORMIN (GLUCOPHAGE) 500 MG tablet TAKE ONE TABLET BY MOUTH TWICE DAILY WITH MEALS     Omega-3 Fatty Acids (OMEGA-3 FISH OIL) 1000 MG CAPS Take 1 capsule (1 g) by mouth daily     triamcinolone (NASACORT AQ) 55 MCG/ACT nasal inhaler Spray 2 sprays into both nostrils daily (Patient taking differently: Spray 2 sprays into both nostrils as needed )     No current facility-administered medications for this visit.         Medication that can affect sleep: None    Allergies   Allergen Reactions     Dapsone Hives     Sulfa Drugs Hives     Allergy to Dapsone- wants to stay away from all sulfa drugs            Past Medical History:     Does not need 02 supplement at night     Past Medical History:   Diagnosis Date     Celiac disease      Cough     most laureano.  Cough lasting 6-8 weeks     Gout      Hepatitis     trhought related to celiac disease along with possible steatohepatitis     HTN (hypertension)      Hyperlipidemia LDL goal <100      Hypothyroidism 9/29/2014      Obesity 9/29/2014     Polyp of colon 2009    3mm. Hyperplastic     Type 2 diabetes mellitus without complication  (goal A1C<7) 10/20/2015               Past Surgical History:     Yes previous upper airway surgery, tonsillectomy    Past Surgical History:   Procedure Laterality Date     BIOPSY  1985    Dermatitis Herpetiformis diagnosis     C NONSPECIFIC PROCEDURE Left     arthroscopic knee surgery     C NONSPECIFIC PROCEDURE  1996     D&C for dysfunctional uterine bleeding - findings benign     COLONOSCOPY  2008    1 polyp removed     ENT SURGERY  1958    tonsils removed     ORTHOPEDIC SURGERY  2008    arthroscopic left knee            Social History:     Social History   Substance Use Topics     Smoking status: Never Smoker     Smokeless tobacco: Never Used     Alcohol use Yes      Comment: 1 drink weekly         Chemical History:     Tobacco: No     Uses 1 cups/day of coffee. Last caffeine intake is usually before 9 am    EtOH: Yes, rarely  Recreational Drugs: No    Psych Hx:   None    Current dangers to self or others: None           Family History:     Family History   Problem Relation Age of Onset     GASTROINTESTINAL DISEASE Father      colon polyps     Hypertension Father      Hyperlipidemia Father      Cerebrovascular Disease Father      Obesity Father      Breast Cancer Maternal Grandmother      Cancer - colorectal Other      mother's brother     Cerebrovascular Disease Other      Father's brother     Interstitial Lung Disease Mother      Pulmnary fibrosis        Sleep Family Hx:        RLS- No  BIJU - No, father snored but not diagnosed.  Insomnia - No  Parasomnia - No         Review of Systems:     A complete 10 point review of systems was negative other than HPI or as commented below:   Patient denies chest pain, dyspnea with activity and or rest, wheezing, abdominal pain, n&v, fever, chills, dysuria, leg pain or swelling. Patient is also denies ear pain, sore throat, postnasal drip,  dry cough.  Patient  "had running nose related to allergies, muscle pain and joint pain.    Jenna Card has gained 5 pounds in the last year.            Physical Examination:   /68  Pulse 57  Ht 1.715 m (5' 7.52\")  Wt 84.9 kg (187 lb 3.2 oz)  SpO2 97%  BMI 28.87 kg/m2     Neck Circumference: 41 cm   Constitutional: . Awake, alert, cooperative, in no apparent distress  Mood: euthymic; affect congruent with full range and intensity.  Attention/Concentration:  Normal   Eyes: Pupils round and reactive. No icterus.  ENT: Mallampati Class: IV.   Tonsillar Stage: 0  surgically removed  Clear nasal passages. Enlarged inferior turbinates. No deviated septum.  Oropharynx: No high arched palate. No pharyngeal erythema or exudates, micrognathia, elongated uvula. No lateral narrowing  Tongue: No relative macroglossia   Dentition: Good.  Dentures: None  Neck: Supple, no thyroid enlargement.   Cardiovascular: Regular S1 and S2, no murmurs or gallops.    Pulmonary:  Chest symmetric, lungs reveal decreased breath sounds at bases. No rales or wheezes.  Abdomen: Soft, obese, non tender.  Extremities:  trace pedal edema.  Muscle/joint: Strength and tone normal   Skin:  No rash or significant lesions examined areas of skin.   Neurologic: Alert, oriented x3, no focal neurological deficit.           Data: All pertinent previous laboratory data reviewed     No results found for: PH, PHARTERIAL, PO2, VC9OSVCNMTO, SAT, PCO2, HCO3, BASEEXCESS, CAPRICE, BEB  Lab Results   Component Value Date    TSH 5.09 (H) 08/30/2018    TSH 5.14 (H) 05/18/2018     Lab Results   Component Value Date     (H) 08/30/2018     (H) 05/18/2018     Lab Results   Component Value Date    HGB 14.0 09/30/2014    HGB 13.5 04/24/2014     Lab Results   Component Value Date    BUN 23 08/30/2018    BUN 17 05/18/2018    CR 0.72 08/30/2018    CR 0.82 05/18/2018     Lab Results   Component Value Date    CO2 20 08/30/2018    CO2 26 05/18/2018     No results found for: " EMMA      Echocardiography: No    Chest x-ray: No    PFT: No        Copy to: Wilmar Boyer  Copy to: Wilmar العراقي MD 9/5/2018   [facility]  [address], [place] [address]   [phone] Clinic    Total time spent with patient: 43 minutes with this patient today in which 25 minutes was spent in counseling/coordination of care and going over planned testing and recommendations.

## 2018-09-11 ENCOUNTER — HOSPITAL ENCOUNTER (OUTPATIENT)
Dept: CARDIOLOGY | Facility: CLINIC | Age: 63
Discharge: HOME | End: 2018-09-11
Attending: INTERNAL MEDICINE | Admitting: INTERNAL MEDICINE
Payer: COMMERCIAL

## 2018-09-11 DIAGNOSIS — I25.10 CORONARY ARTERY DISEASE INVOLVING NATIVE CORONARY ARTERY OF NATIVE HEART WITHOUT ANGINA PECTORIS: ICD-10-CM

## 2018-09-11 DIAGNOSIS — R53.83 FATIGUE, UNSPECIFIED TYPE: ICD-10-CM

## 2018-09-11 PROCEDURE — 93306 TTE W/DOPPLER COMPLETE: CPT

## 2018-09-11 PROCEDURE — 93306 TTE W/DOPPLER COMPLETE: CPT | Mod: 26 | Performed by: INTERNAL MEDICINE

## 2018-09-27 ENCOUNTER — THERAPY VISIT (OUTPATIENT)
Dept: SLEEP MEDICINE | Facility: CLINIC | Age: 63
End: 2018-09-27
Payer: COMMERCIAL

## 2018-09-27 DIAGNOSIS — R06.83 SNORING: ICD-10-CM

## 2018-09-27 DIAGNOSIS — G47.9 SLEEP DISTURBANCE: ICD-10-CM

## 2018-09-27 PROCEDURE — 95810 POLYSOM 6/> YRS 4/> PARAM: CPT | Performed by: INTERNAL MEDICINE

## 2018-09-27 NOTE — MR AVS SNAPSHOT
After Visit Summary   9/27/2018    Jenna Card    MRN: [id number]           Patient Information     Date Of Birth          1955        Visit Information        Provider Department      9/27/2018 8:30 PM BED 1  SLEEP [facility]        Today's Diagnoses     Sleep disturbance        Snoring           Follow-ups after your visit        Your next 10 appointments already scheduled     Oct 04, 2018 10:00 AM CDT   Return Sleep Patient with Reinier العراقي MD   [facility] ([facility])    [address] [address] [address] [phone]              Who to contact     If you have questions or need follow up information about today's clinic visit or your schedule please contact [facility] directly at [phone].  Normal or non-critical lab and imaging results will be communicated to you by MyChart, letter or phone within 4 business days after the clinic has received the results. If you do not hear from us within 7 days, please contact the clinic through MyChart or phone. If you have a critical or abnormal lab result, we will notify you by phone as soon as possible.  Submit refill requests through [employer] or call your pharmacy and they will forward the refill request to us. Please allow 3 business days for your refill to be completed.          Additional Information About Your Visit        MyChart Information     [employer] gives you secure access to your electronic health record. If you see a primary care provider, you can also send messages to your care team and make appointments. If you have questions, please call your primary care clinic.  If you do not have a primary care provider, please call [phone] and they will assist you.        Care EveryWhere ID     This is your Care EveryWhere ID. This could be used by other organizations to access your [facility] medical records  [id number]          Blood Pressure from Last 3 Encounters:   09/05/18 150/68   08/29/18 138/88   07/16/18 132/78    Weight from Last 3 Encounters:   09/05/18 84.9 kg (187 lb 3.2 oz)   08/29/18 86.2 kg (190 lb 1.6 oz)   07/16/18 87.1 kg (192 lb)              We Performed the Following     Comprehensive Sleep Study          Today's Medication Changes          These changes are accurate as of 9/27/18 11:59 PM.  If you have any questions, ask your nurse or doctor.               These medicines have changed or have updated prescriptions.        Dose/Directions    albuterol 108 (90 Base) MCG/ACT inhaler   Commonly known as:  PROAIR HFA/PROVENTIL HFA/VENTOLIN HFA   This may have changed:  when to take this   Used for:  Acute bronchitis with symptoms > 10 days        Dose:  2 puff   Inhale 2 puffs into the lungs every 6 hours as needed for shortness of breath / dyspnea or wheezing   Quantity:  1 Inhaler   Refills:  0       fluticasone 110 MCG/ACT Inhaler   Commonly known as:  FLOVENT HFA   This may have changed:    - when to take this  - reasons to take this   Used for:  Acute bronchitis with symptoms > 10 days        Dose:  2 puff   Inhale 2 puffs into the lungs 2 times daily   Quantity:  1 Inhaler   Refills:  1       triamcinolone 55 MCG/ACT inhaler   Commonly known as:  NASACORT AQ   This may have changed:    - when to take this  - reasons to take this   Used for:  Post-nasal drainage        Dose:  2 spray   Spray 2 sprays into both nostrils daily   Quantity:  1 Bottle   Refills:  0                Primary Care Provider Office Phone # Fax #    Wilmar Boyer [phone]       [address] [place] [id number]        Equal Access to Services     BRIANA [facility] AH: Hadii ariela walden Soadriana, waaxda luqadaha, qaybta kaalmada adeegyada, millicent ladd. So [facility] [phone].    ATENCIÓN: Si habla español, tiene a weiss disposición servicios gratuitos de asistencia lingüística. Llame al  [phone].    We comply with applicable federal civil rights laws and [place] laws. We do not discriminate on the basis of race, color, national origin, age, disability, sex, sexual orientation, or gender identity.            Thank you!     Thank you for choosing [facility]  for your care. Our goal is always to provide you with excellent care. Hearing back from our patients is one way we can continue to improve our services. Please take a few minutes to complete the written survey that you may receive in the mail after your visit with us. Thank you!             Your Updated Medication List - Protect others around you: Learn how to safely use, store and throw away your medicines at www.disposemymeds.org.          This list is accurate as of 9/27/18 11:59 PM.  Always use your most recent med list.                   Brand Name Dispense Instructions for use Diagnosis    albuterol 108 (90 Base) MCG/ACT inhaler    PROAIR HFA/PROVENTIL HFA/VENTOLIN HFA    1 Inhaler    Inhale 2 puffs into the lungs every 6 hours as needed for shortness of breath / dyspnea or wheezing    Acute bronchitis with symptoms > 10 days       allopurinol 300 MG tablet    ZYLOPRIM    30 tablet    TAKE ONE TABLET BY MOUTH ONE TIME DAILY    Chronic gout without tophus, unspecified cause, unspecified site       aspirin 81 MG tablet      Take 81 mg by mouth daily        atenolol 50 MG tablet    TENORMIN    60 tablet    TAKE TWO TABLETS BY MOUTH DAILY    Essential hypertension       atorvastatin 20 MG tablet    LIPITOR    90 tablet    Take 1 tablet (20 mg) by mouth daily    Coronary artery disease involving native coronary artery of native heart without angina pectoris       blood glucose monitoring lancets     1 Box    Use to test blood sugar one time daily or as directed.    Type 2 diabetes, HbA1c goal < 7% (H)       blood glucose monitoring meter device kit           blood glucose monitoring test strip    ACCU-CHEK MARSHALL PLUS    1  Box    Check blood sugars once daily    Type 2 diabetes mellitus without complication, without long-term current use of insulin (H)       fish oil-omega-3 fatty acids 1000 MG capsule      Take 1 capsule (1 g) by mouth daily    Hyperlipidemia LDL goal <100       fluticasone 110 MCG/ACT Inhaler    FLOVENT HFA    1 Inhaler    Inhale 2 puffs into the lungs 2 times daily    Acute bronchitis with symptoms > 10 days       hydrochlorothiazide 25 MG tablet    HYDRODIURIL    90 tablet    TAKE ONE TABLET BY MOUTH IN THE MORNING    Essential hypertension       levothyroxine 125 MCG tablet    SYNTHROID/LEVOTHROID    30 tablet    1/2 tab daily (total 62.5mcg per day)    Hypothyroidism, unspecified type       losartan 100 MG tablet    COZAAR    90 tablet    TAKE HALF TABLET BY MOUTH TWICE DAILY    Essential hypertension       metFORMIN 500 MG tablet    GLUCOPHAGE    180 tablet    TAKE ONE TABLET BY MOUTH TWICE DAILY WITH MEALS    Type 2 diabetes mellitus without complication, without long-term current use of insulin (H)       triamcinolone 55 MCG/ACT inhaler    NASACORT AQ    1 Bottle    Spray 2 sprays into both nostrils daily    Post-nasal drainage       zolpidem 5 MG tablet    AMBIEN    1 tablet    Take tablet by mouth 15 minutes prior to sleep, for Sleep Study    Sleep disturbance

## 2018-10-01 LAB — SLPCOMP: NORMAL

## 2018-10-01 NOTE — PROCEDURES
" SLEEP STUDY INTERPRETATION  DIAGNOSTIC POLYSOMNOGRAPHY REPORT      Patient: SAUL BULLARD  YOB: 1955  Study Date: 9/27/2018  MRN: [id number]  Referring Provider: Balwinder Boyer MD   Ordering Provider: Reinier العراقي MD     Indications for Polysomnography: The patient is a 62 y old Female who is 5' 7\" and weighs 187.0 lbs. Her BMI is 29.0, [place] sleepiness scale 5.0 and neck circumference is 41.0 cm. Relevant medical history includes DM type I, hypertension, hyperlipidemia, hypothyroidism, obesity, gout, celiac disease, pre-op evaluation of pelvic prolapse surgery, snoring, witnessed apnea and excessive daytime sleepiness. A diagnostic polysomnogram was performed to evaluate for sleep apnea, periodic leg movements, CSA, hypoventilation and hypoxemia.    Polysomnogram Data: A full night polysomnogram recorded the standard physiologic parameters including EEG, EOG, EMG, ECG, nasal and oral airflow. Respiratory parameters of chest and abdominal movements were recorded with respiratory inductance plethysmography. Oxygen saturation was recorded by pulse oximetry. Hypopnea scoring rule used: 1B 4%.    Sleep Architecture: All stages of sleep were achieved with reduced REM sleep. There was increased sleep latency in spite of sleep aid. There were increased sleep fragmentation and poor sleep efficiency.  The total recording time of the polysomnogram was 484.1 minutes. The total sleep time was 394.0 minutes. Sleep latency was increased at 51.8 minutes with the use of a sleep aid (Zolpidem 5 mg). REM latency was 122.0 minutes. Arousal index was increased at 34.0 arousals per hour. Sleep efficiency was decreased at 81.4%. Wake after sleep onset was 37.5 minutes. The patient spent 7.4% of total sleep time in Stage N1, 67.4% in Stage N2, 18.3% in Stage N3, and 7.0% in REM. Time in REM supine was 0.5 minutes.    Respiration: There was moderate sleep disordered breathing, characterized predominantly by obstructive " respiratory apneas and hypopneas. The sleep disordered breathing may be underestimated by reduced REM sleep and supine sleep. There was no sleep related hypoxemia.    Events ? The polysomnogram revealed a presence of 39 obstructive, 1 central, and - mixed apneas resulting in an apnea index of 6.1 events per hour. There were 61 obstructive hypopneas and - central hypopneas resulting in an obstructive hypopnea index of 9.3 and central hypopnea index of - events per hour. The combined apnea/hypopnea index was 15.4 events per hour (central apnea/hypopnea index was 0.2 events per hour). The REM AHI was 2.2 events per hour. The supine AHI was 80.6 events per hour. The RERA index was 1.7 events per hour.  The RDI was 17.1 events per hour.    Snoring - was reported as loud and intermittent.    Respiratory rate and pattern - was notable for normal respiratory rate and pattern.    Sustained Sleep Associated Hypoventilation - Transcutaneous carbon dioxide monitoring was not used, however significant hypoventilation was not suggested by oximetry.    Sleep Associated Hypoxemia - (Greater than 5 minutes O2 sat at or below 88%) was not present. Baseline oxygen saturation was 94.9%. Lowest oxygen saturation was 73.9%. Time spent less than or equal to 88% was 3.5 minutes. Time spent less than or equal to 89% was 5.7 minutes.    Movement Activity: There were periodic leg movements with associated arousals. There was no abnormal nocturnal sleep behavior.    Periodic Limb Activity - There were 101 PLMs during the entire study. The PLM index was 15.4 movements per hour. The PLM Arousal Index was 10.7 per hour.    REM EMG Activity - Excessive transient/sustained muscle activity was not present.    Nocturnal Behavior - Abnormal sleep related behaviors were not noted during NREM / REM sleep.     Bruxism - None apparent.    Cardiac Summary: There was normal sinus rhythm throughout the night except a run of 4 beats of supraventricular  tachycardia at epoch # 746.  The average pulse rate was 48.4 bpm. The minimum pulse rate was 39.9 bpm while the maximum pulse rate was 73.2 bpm.  Arrhythmias were noted.    Assessment:     Obstructive Sleep Apnea G47.33    Periodic Limb Movement Disorder G47.61    Supraventricular tachycardia    Recommendations:    Based on the presence of moderate obstructive sleep apnea and excessive daytime sleepiness, treatment could be empirically initiated with Auto?titrating PAP therapy with a range of 8 to 16 cmH2O. Recommend clinical follow up with sleep management team.    Pharmacologic therapy should be used for management of restless legs syndrome only if present and clinically indicated and not based on the presence of periodic limb movements alone.    One run of supraventricular tachycardia related to untreated sleep disordered breathing, follow up primary care doctor.    Advice regarding the risks of drowsy driving.    Suggest optimizing sleep schedule and avoiding sleep deprivation.    Weight management (if BMI > 30).    Avoid sedating medications, including narcotics, and alcohol, as these may exacerbate sleep apnea and/or underlying respiratory disorders.     Follow up primary care doctor as scheduled.           _____________________________________   electronically signed by: LANDON VAIL MD (10/1/18)    cc: Balwinder Boyer MD

## 2018-10-04 ENCOUNTER — OFFICE VISIT (OUTPATIENT)
Dept: SLEEP MEDICINE | Facility: CLINIC | Age: 63
End: 2018-10-04
Payer: COMMERCIAL

## 2018-10-04 VITALS
SYSTOLIC BLOOD PRESSURE: 159 MMHG | BODY MASS INDEX: 28.34 KG/M2 | HEIGHT: 68 IN | HEART RATE: 60 BPM | OXYGEN SATURATION: 98 % | DIASTOLIC BLOOD PRESSURE: 68 MMHG | WEIGHT: 187 LBS

## 2018-10-04 DIAGNOSIS — G47.33 OSA (OBSTRUCTIVE SLEEP APNEA): Primary | ICD-10-CM

## 2018-10-04 DIAGNOSIS — G47.61 PLMD (PERIODIC LIMB MOVEMENT DISORDER): ICD-10-CM

## 2018-10-04 PROCEDURE — 99213 OFFICE O/P EST LOW 20 MIN: CPT | Performed by: INTERNAL MEDICINE

## 2018-10-04 NOTE — MR AVS SNAPSHOT
After Visit Summary   10/4/2018    Jenna Card    MRN: [id number]           Patient Information     Date Of Birth          1955        Visit Information        Provider Department      10/4/2018 10:00 AM Reinier اعلراقي MD [facility] [place]        Today's Diagnoses     BIJU (obstructive sleep apnea)    -  1    PLMD (periodic limb movement disorder)          Care Instructions    MY TREATMENT INFORMATION FOR SLEEP APNEA-  Jenna Card    MY CONTACT NUMBERS ARE:  [phone]  DOCTOR : Reinier العراقي MD  SLEEP CENTER :  [place]  CPAP EQUIPMENT     You have moderate sleep apnea, auto-CPAP is prescribed for you.   AHI 0-5/hr normal  AHI 5-15 evens of hour is a mild sleep apnea  AHI 15-30/hr is moderate sleep apnea  AHI over 30/hr is severe sleep apnea)    CPAP therapy includes adaptation to a mask interface and a delivery of air pressure.    You will be provided with an auto-titrating CPAP with a pressure range of 8-16 cmH2O with heated humidity to limit nasal congestion. Adjust the heat level on humidifier to find a setting that prevents dry nose but does not cause condensation in the hose or mask. Use distilled water in the humidifier.    The CPAP has a ramp function that starts the pressure lower than your prescribed pressure and gradually increases it over a number of minutes.  This may make it easier to fall asleep.                  Try to use the CPAP every-night, all night, at least 7-8 hours each night.  Daytime naps are not advised, but use CPAP if taking naps. Many insurances require that we prove you are using the CPAP at least 4 hours on at least 70% of nights over a 30 day period. We have 90 days to meet those criteria.    Objective measure goal  Compliance  Goal >70% (preferrably 100%)  Leak   Goal < 10% (less than 24 L/min)  AHI  Goal < 5 events per hour   Usage  Goal 7-8 hours.         Patient was advised not to drive if drowsy or sleepy.              "   Discussed weight management and the impact of weight gain on sleep apnea.  Let me know if you snore or feel the pressure is too high.    - Try mask desensitization as below and do not remove mask headgear when you go to bathroom at night, but just unplug the hose.    You can get new supplies (mask, hose and filter) for your CPAP every 3-6 months, covered by insurance. You do not need to get supplies that often, but they are available if you would like them.  You may exchange the mask once within the first month if you feel the initial mask does not fit well.  Contact your medical equipment provider for equipment issues.  Please let me know if you have any return of snoring, daytime sleepiness or poor sleep quality. We will want to make sure your CPAP is adequately treating your apnea.  There is a website called CPAP.com that has accessories that may make CPAP use easier. Please visit it at your convenience.    Our phone number is [phone]    Follow-up 4-6 weeks after PAP usage.  Bring your CPAP machine with you to the follow up appointment.    Frequently asked questions:  1. What is Obstructive Sleep Apnea (BIJU)? BIJU is the most common type of sleep apnea. Apnea literally means, \"without breath.\" It is characterized by repetitive pauses in breathing, despite continued effort to breathe, and is usually associated with a reduction in blood oxygen saturation. Apneas can last 10 to over 60 seconds. It is caused by narrowing or collapse of the upper airway as muscles relax during sleep. Severity of sleep apnea is determined by frequency of breathing events and their effect on your sleep and oxygen levels determined during sleep testing.   2. What are the consequences of BIJU? Symptoms include: daytime sleepiness- possibly increasing the risk of falling asleep while driving, unrefreshing/restless sleep, snoring, insomnia, waking frequently to urinate, waking with heartburn or reflux, reduced concentration and " memory, and morning headaches. Other health consequences may include development of high blood pressure and other cardiovascular disease in persons who are susceptible. Untreated BIJU  can contribute to heart disease, stroke and diabetes.   3. What are the treatment options? In most situations, sleep apnea is a lifelong disease that must be managed with daily therapy. Medications are not effective for sleep apnea and surgery is generally not performed until other therapies have been tried. Therapy is usually tailored to the individual patient based on many factors including your wishes as well as severity of sleep apnea and severity of obesity. Continuous Positive Airway (CPAP) is the most reliable treatment. An oral device to hold your jaw forward is usually      IF I HAVE SLEEP APNEA.....  WHERE CAN I FIND MORE INFORMATION?    American Academy of Sleep Medicine Patient information on sleep disorders:  http://yoursleep.aasmnet.org    THINGS I SHOULD REMEMBER  In most situations, sleep apnea is a lifelong disease that must be managed with daily therapy. Untreated disease, when severe, may result in an increased risk for an array of problems from heart disease to mood changes, car accidents and shorter lifespan.    CPAP-  WHY AND HOW?                                    Continuous positive airway pressure, or CPAP, is the most effective treatment for obstructive sleep apnea. A decision to use CPAP is a major step forward in the pursuit of a healthier life. The successful use of CPAP will help you breathe easier, sleep better and live healthier. Using CPAP can be a positive experience if you keep these hoskins points in mind:  1. Commitment  CPAP is not a quick fix for your problem. It involves a long-term commitment to improve your sleep and your health.    2. Communication  Stay in close communication with both your sleep doctor and your CPAP supplier. Ask lots of questions and seek help when you need  "it.    3. Consistency  Use CPAP all night, every night and for every nap. You will receive the maximum health benefits from CPAP when you use it every time that you sleep. This will also make it easier for your body to adjust to the treatment.    4. Correction  The first machine and mask that you try may not be the best ones for you. Work with your sleep doctor and your CPAP supplier to make corrections to your equipment selection. Ask about trying a different type of machine or mask if you have ongoing problems. Make sure that your mask is a good fit and learn to use your equipment properly.    5. Challenge  Tell a family member or close friend to ask you each morning if you used your CPAP the previous night. Have someone to challenge you to give it your best effort.    6. Connection   Your adjustment to CPAP will be easier if you are able to connect with others who use the same treatment. Ask your sleep doctor if there is a support group in your area for people who have sleep apnea, or look for one on the Internet.    7. Comfort   Increase your level of comfort by using a saline spray, decongestant or heated humidifier if CPAP irritates your nose, mouth or throat. Use your unit's \"ramp\" setting to slowly get used to the air pressure level. There may be soft pads you can buy that will fit over your mask straps. Look on www.CPAP.com for accessories such as these straps, a pillow contoured for side-sleeping with CPAP, longer hoses, hose covers to reduce condensation, or stands to keep the hose out of your way.                                                              8. Cleaning   Clean your mask, tubing and headgear on a regular basis. Put this time in your schedule so that you don't forget to do it. Check and replace the filters for your CPAP unit and humidifier.    9. Completion   Although you are never finished with CPAP therapy, you should reward yourself by celebrating the completion of your first month of " treatment. Expect this first month to be your hardest period of adjustment. It will involve some trial and error as you find the machine, mask and pressure settings that are right for you.    10. Continuation  After your first month of treatment, continue to make a daily commitment to use your CPAP all night, every night and for every nap.    CPAP-Tips to starting with success:  Begin using your CPAP for short periods of time during the day while you watch TV or read.    Use CPAP every night and for every nap. Using it less often reduces the health benefits and makes it harder for your body to get used to it.    Newer CPAP models are virtually silent; however, if you find the sound of your CPAP machine to be bothersome, place the unit under your bed to dampen the sound.     Make small adjustments to your mask, tubing, straps and headgear until you get the right fit. Tightening the mask may actually worsen the leak.  If it leaves significant marks on your face or irritates the bridge of your nose, it may not be the best mask for you.  Speak with the person who supplied the mask and consider trying other masks.    Use a saline nasal spray to ease mild nasal congestion. Neti-Pot or saline nasal rinses may also help. Nasal gel sprays can help reduce nasal dryness.  Biotene mouthwash can be helpful to protect your teeth if you experience frequent dry mouth.  Dry mouth may be a sign of air escaping out of your mouth or out of the mask in the case of a full face mask.  Speak with your provider if you expect that is the case.     Take a nasal decongestant to relieve more severe nasal or sinus congestion.  Do not use Afrin (oxymetazoline) nasal spray more than 3 days in a row.  Speak with your sleep doctor if your nasal congestion is chronic.    Use a heated humidifier that fits your CPAP model to enhance your breathing comfort. Adjust the heat setting up if you get a dry nose or throat, down if you get condensation in the  "hose or mask.  Position the CPAP lower than you so that any condensation in the hose drains back into the machine rather than towards the mask.    Try a system that uses nasal pillows if traditional masks give you problems.    Clean your mask, tubing and headgear once a week. Make sure the equipment dries fully.    Regularly check and replace the filters for your CPAP unit and humidifier.    Work closely with your sleep doctor and your CPAP supplier to make sure that you have the machine, mask and air pressure setting that works best for you.        MASK DESENSITIZATION:    If you are experiencing some anxiety about trying a PAP mask or breathing with pressurized air try the following steps in sequence to get used to PAP. This is called \"desensitization\".    1.  Wear mask (disconnected from the device) for 60 minutes daily awake and use a distraction: Sit and watch TV, read, or listen to music with the mask on. Take the mask off and put it back on, as needed. This can be in a chair in the living room, for example.    2.  When you can use the mask without taking it off for 60 minutes and without anxiety, then proceed to step 3.    3.  Attach the mask to the PAP device, and switch the unit  on  and practice breathing through the mask for one hour while watching television, reading or performing some other sedentary, distracting activity.     4.  Once you are comfortable wearing PAP for 30-60 minutes without anxiety while distracted with an activity, try to use it in bed. You can continue distraction in bed by watching TV, reading, listening to music or an audio book, etc.  The main goal is to  not think about using PAP  but pay attention to relaxing.    5. Use the PAP during scheduled one-hour naps at home.    6. Use PAP during initial 3-4 hours of nocturnal sleep.    7. Use PAP through an entire night of sleep.    Weight Loss:    Weight management is a personal decision.  If you are interested in exploring weight " loss strategies, the following discussion covers the impact on weight loss on sleep apnea and the approaches that may be successful.    Weight loss decreases severity of sleep apnea in most people with obesity. For those with mild obesity who have developed snoring with weight gain, even 15-30 pound weight loss can improve and occasionally eliminate sleep apnea.  Structured and life-long dietary and health habits are necessary to lose weight and keep healthier weight levels.     Though there may be significant health benefits from weight loss, long-term weight loss is very difficult to achieve- studies show success with dietary management in less than 10% of people. In addition, substantial weight loss may require years of dietary control and may be difficult if patients have severe obesity. In these cases, surgical management may be considered.  Finally, older individuals who have tolerated obesity without health complications may be less likely to benefit from weight loss strategies.    Your BMI is Body mass index is 28.84 kg/(m^2).  Body mass index (BMI) is one way to tell whether you are at a healthy weight, overweight, or obese. It measures your weight in relation to your height.  A BMI of 18.5 to 24.9 is in the healthy range. A person with a BMI of 25 to 29.9 is considered overweight, and someone with a BMI of 30 or greater is considered obese. More than two-thirds of American adults are considered overweight or obese.  Being overweight or obese increases the risk for further weight gain. Excess weight may lead to heart disease and diabetes.  Creating and following plans for healthy eating and physical activity may help you improve your health.  Weight control is part of healthy lifestyle and includes exercise, emotional health, and healthy eating habits. Careful eating habits lifelong are the mainstay of weight control. Though there are significant health benefits from weight loss, long-term weight loss with  diet alone may be very difficult to achieve- studies show long-term success with dietary management in less than 10% of people. Attaining a healthy weight may be especially difficult to achieve in those with severe obesity. In some cases, medications, devices and surgical management might be considered.  What can you do?  If you are overweight or obese and are interested in methods for weight loss, you should discuss this with your provider.     Consider reducing daily calorie intake by 500 calories.     Keep a food journal.     Avoiding skipping meals, consider cutting portions instead.    Diet combined with exercise helps maintain muscle while optimizing fat loss. Strength training is particularly important for building and maintaining muscle mass. Exercise helps reduce stress, increase energy, and improves fitness. Increasing exercise without diet control, however, may not burn enough calories to loose weight.       Start walking three days a week 10-20 minutes at a time    Work towards walking thirty minutes five days a week     Eventually, increase the speed of your walking for 1-2 minutes at time    In addition, we recommend that you review healthy lifestyles and methods for weight loss available through the National Institutes of Health patient information sites:  http://win.niddk.nih.gov/publications/index.htm    And look into health and wellness programs that may be available through your health insurance provider, employer, local community center, or timmy club.    Weight management plan: Patient was referred to their PCP to discuss a diet and exercise plan.    Your blood pressure was checked while you were in clinic today.  Please read the guidelines below about what these numbers mean and what you should do about them.  Your systolic blood pressure is the top number.  This is the pressure when the heart is pumping.  Your diastolic blood pressure is the bottom number.  This is the pressure in between  beats.  If your systolic blood pressure is less than 120 and your diastolic blood pressure is less than 80, then your blood pressure is normal. There is nothing more that you need to do about it  If your systolic blood pressure is 120-139 or your diastolic blood pressure is 80-89, your blood pressure may be higher than it should be.  You should have your blood pressure re-checked within a year by a primary care provider.  If your systolic blood pressure is 140 or greater or your diastolic blood pressure is 90 or greater, you may have high blood pressure.  High blood pressure is treatable, but if left untreated over time it can put you at risk for heart attack, stroke, or kidney failure.  You should have your blood pressure re-checked by a primary care provider within the next four weeks.              Follow-ups after your visit        Who to contact     If you have questions or need follow up information about today's clinic visit or your schedule please contact [facility] [place] at [phone].  Normal or non-critical lab and imaging results will be communicated to you by [employer], letter or phone within 4 business days after the clinic has received the results. If you do not hear from us within 7 days, please contact the clinic through [employer] or phone. If you have a critical or abnormal lab result, we will notify you by phone as soon as possible.  Submit refill requests through [employer] or call your pharmacy and they will forward the refill request to us. Please allow 3 business days for your refill to be completed.          Additional Information About Your Visit        [employer] Information     [employer] gives you secure access to your electronic health record. If you see a primary care provider, you can also send messages to your care team and make appointments. If you have questions, please call your primary care clinic.  If you do not have a primary care provider, please call  "[phone] and they will assist you.        Care EveryWhere ID     This is your Care EveryWhere ID. This could be used by other organizations to access your [facility] medical records  [id number]        Your Vitals Were     Pulse Height Pulse Oximetry BMI (Body Mass Index)          60 1.715 m (5' 7.52\") 98% 28.84 kg/m2         Blood Pressure from Last 3 Encounters:   10/04/18 159/68   09/05/18 150/68   08/29/18 138/88    Weight from Last 3 Encounters:   10/04/18 84.8 kg (187 lb)   09/05/18 84.9 kg (187 lb 3.2 oz)   08/29/18 86.2 kg (190 lb 1.6 oz)              We Performed the Following     Comprehensive DME          Today's Medication Changes          These changes are accurate as of 10/4/18 10:32 AM.  If you have any questions, ask your nurse or doctor.               These medicines have changed or have updated prescriptions.        Dose/Directions    fluticasone 110 MCG/ACT Inhaler   Commonly known as:  FLOVENT HFA   This may have changed:    - when to take this  - reasons to take this   Used for:  Acute bronchitis with symptoms > 10 days        Dose:  2 puff   Inhale 2 puffs into the lungs 2 times daily   Quantity:  1 Inhaler   Refills:  1       triamcinolone 55 MCG/ACT inhaler   Commonly known as:  NASACORT AQ   This may have changed:    - when to take this  - reasons to take this   Used for:  Post-nasal drainage        Dose:  2 spray   Spray 2 sprays into both nostrils daily   Quantity:  1 Bottle   Refills:  0                Primary Care Provider Office Phone # Fax #    Wilmar Boyer [phone]       [address] [place] [id number]        Equal Access to Services     [facility]: Haddarnell Diamond, santosda lujenn, qarosyta kaalmamillicent pringle. So [facility] [phone].    ATENCIÓN: Si habla español, tiene a weiss disposición servicios gratuitos de asistencia lingüística. Llame al [phone].    We comply with applicable federal civil " rights laws and [place] laws. We do not discriminate on the basis of race, color, national origin, age, disability, sex, sexual orientation, or gender identity.            Thank you!     Thank you for choosing [facility]  for your care. Our goal is always to provide you with excellent care. Hearing back from our patients is one way we can continue to improve our services. Please take a few minutes to complete the written survey that you may receive in the mail after your visit with us. Thank you!             Your Updated Medication List - Protect others around you: Learn how to safely use, store and throw away your medicines at www.disposemymeds.org.          This list is accurate as of 10/4/18 10:32 AM.  Always use your most recent med list.                   Brand Name Dispense Instructions for use Diagnosis    albuterol 108 (90 Base) MCG/ACT inhaler    PROAIR HFA/PROVENTIL HFA/VENTOLIN HFA    1 Inhaler    Inhale 2 puffs into the lungs every 6 hours as needed for shortness of breath / dyspnea or wheezing    Acute bronchitis with symptoms > 10 days       allopurinol 300 MG tablet    ZYLOPRIM    30 tablet    TAKE ONE TABLET BY MOUTH ONE TIME DAILY    Chronic gout without tophus, unspecified cause, unspecified site       aspirin 81 MG tablet      Take 81 mg by mouth daily        atenolol 50 MG tablet    TENORMIN    60 tablet    TAKE TWO TABLETS BY MOUTH DAILY    Essential hypertension       atorvastatin 20 MG tablet    LIPITOR    90 tablet    Take 1 tablet (20 mg) by mouth daily    Coronary artery disease involving native coronary artery of native heart without angina pectoris       blood glucose monitoring lancets     1 Box    Use to test blood sugar one time daily or as directed.    Type 2 diabetes, HbA1c goal < 7% (H)       blood glucose monitoring meter device kit           blood glucose monitoring test strip    ACCU-CHEK MARSHALL PLUS    1 Box    Check blood sugars once daily    Type 2  diabetes mellitus without complication, without long-term current use of insulin (H)       fish oil-omega-3 fatty acids 1000 MG capsule      Take 1 capsule (1 g) by mouth daily    Hyperlipidemia LDL goal <100       fluticasone 110 MCG/ACT Inhaler    FLOVENT HFA    1 Inhaler    Inhale 2 puffs into the lungs 2 times daily    Acute bronchitis with symptoms > 10 days       hydrochlorothiazide 25 MG tablet    HYDRODIURIL    90 tablet    TAKE ONE TABLET BY MOUTH IN THE MORNING    Essential hypertension       levothyroxine 125 MCG tablet    SYNTHROID/LEVOTHROID    30 tablet    1/2 tab daily (total 62.5mcg per day)    Hypothyroidism, unspecified type       losartan 100 MG tablet    COZAAR    90 tablet    TAKE HALF TABLET BY MOUTH TWICE DAILY    Essential hypertension       metFORMIN 500 MG tablet    GLUCOPHAGE    180 tablet    TAKE ONE TABLET BY MOUTH TWICE DAILY WITH MEALS    Type 2 diabetes mellitus without complication, without long-term current use of insulin (H)       triamcinolone 55 MCG/ACT inhaler    NASACORT AQ    1 Bottle    Spray 2 sprays into both nostrils daily    Post-nasal drainage       zolpidem 5 MG tablet    AMBIEN    1 tablet    Take tablet by mouth 15 minutes prior to sleep, for Sleep Study    Sleep disturbance

## 2018-10-04 NOTE — PROGRESS NOTES
Sleep Study Follow-Up Visit:    Date on this visit: 10/4/2018    ASSESSMENT / PLAN:       BIJU (obstructive sleep apnea)  PLMD (periodic limb movement disorder)    Discussed with patient the recent sleep study and treatment options, we recommend Auto-CPAP 8-16 cmH2O in addition to weight loss and avoiding sleeping supine position. Patient is recommended to improve his sleep-wake schedule and good sleep hygiene. Patient was advised to use PAP therapy for at least 7-8 hours and during naps (naps are not recommended).  Instructions given. Follow up 4-6 weeks after PAP use.      All questions were answered.  The patient indicates understanding of the above issues and agrees with the plan set forth.    No orders of the defined types were placed in this encounter.      She will follow up with me in about 2 month(s).       BRIEF SUMMARY:    Jenna Card is a 63 year old female with history of DM type I, hypertension, hyperlipidemia, hypothyroidism, obesity, gout, celiac disease, pre-op evaluation of pelvic prolapse surgery, snoring, witnessed apnea and excessive daytime sleepiness comes in today for follow-up of her sleep study done on 18 at the [facility] for result of sleep study. Please see H&P for his presenting sleep symptoms for additional details.      PS18  Sleep latency 51.8 minutes with sleep aid.    REM achieved.   REM latency 122 minutes.    Sleep efficiency 81.4%. Total sleep time 394 minutes.  Sleep architecture:  Stage 1, 7.4% (5%), stage 2, 67.4% (45-55%), stage 3, 18.3% (15-20%), stage REM, 7% (20-25%).    AHI was 15.4/hour.   CSAI was 0.2/hour.   RDI 17.1/hour.    REM AHI 2.2/hour.    Supine AHI 80.6/hour.    Lowest O2 saturation:73.9%  S/He spent 3.5 minutes below 88% SpO2.   Periodic Limb Movement Index 15.4/hour.       These findings were reviewed with the patient and copy of the sleep study result was given.    Past medical/surgical history, family history, social history,  medications and allergies were reviewed.      Problem List:  Patient Active Problem List    Diagnosis Date Noted     Chronic gout without tophus, unspecified cause, unspecified site 07/04/2018     Priority: Medium     Type 2 diabetes mellitus without complication, without long-term current use of insulin (H) 10/30/2016     Priority: Medium     Hypothyroidism, unspecified type 06/13/2016     Priority: Medium     Essential hypertension 12/16/2015     Priority: Medium     Celiac disease      Priority: Medium     Obesity 09/29/2014     Priority: Medium     Hepatitis      Priority: Medium     Hyperlipidemia LDL goal <100      Priority: Medium             Medications:     Current Outpatient Prescriptions   Medication Sig     albuterol (PROAIR HFA, PROVENTIL HFA, VENTOLIN HFA) 108 (90 BASE) MCG/ACT inhaler Inhale 2 puffs into the lungs every 6 hours as needed for shortness of breath / dyspnea or wheezing (Patient taking differently: Inhale 2 puffs into the lungs as needed for shortness of breath / dyspnea or wheezing )     allopurinol (ZYLOPRIM) 300 MG tablet TAKE ONE TABLET BY MOUTH ONE TIME DAILY      aspirin 81 MG tablet Take 81 mg by mouth daily     atenolol (TENORMIN) 50 MG tablet TAKE TWO TABLETS BY MOUTH DAILY      atorvastatin (LIPITOR) 20 MG tablet Take 1 tablet (20 mg) by mouth daily     blood glucose monitoring (ACCU-CHEK MARSHALL PLUS) meter device kit      blood glucose monitoring (ACCU-CHEK MARSHALL PLUS) test strip Check blood sugars once daily     blood glucose monitoring (ACCU-CHEK MULTICLIX) lancets Use to test blood sugar one time daily or as directed.     fluticasone (FLOVENT HFA) 110 MCG/ACT inhaler Inhale 2 puffs into the lungs 2 times daily (Patient taking differently: Inhale 2 puffs into the lungs as needed )     hydrochlorothiazide (HYDRODIURIL) 25 MG tablet TAKE ONE TABLET BY MOUTH IN THE MORNING      levothyroxine (SYNTHROID/LEVOTHROID) 125 MCG tablet 1/2 tab daily (total 62.5mcg per day)     losartan  "(COZAAR) 100 MG tablet TAKE HALF TABLET BY MOUTH TWICE DAILY      metFORMIN (GLUCOPHAGE) 500 MG tablet TAKE ONE TABLET BY MOUTH TWICE DAILY WITH MEALS     Omega-3 Fatty Acids (OMEGA-3 FISH OIL) 1000 MG CAPS Take 1 capsule (1 g) by mouth daily     triamcinolone (NASACORT AQ) 55 MCG/ACT nasal inhaler Spray 2 sprays into both nostrils daily (Patient taking differently: Spray 2 sprays into both nostrils as needed )     zolpidem (AMBIEN) 5 MG tablet Take tablet by mouth 15 minutes prior to sleep, for Sleep Study     No current facility-administered medications for this visit.           PHYSICAL EXAMINATION:  /68  Pulse 60  Ht 1.715 m (5' 7.52\")  Wt 84.8 kg (187 lb)  SpO2 98%  BMI 28.84 kg/m2          Data: All pertinent previous laboratory data reviewed     No results found for: PH, PHARTERIAL, PO2, [id number], SAT, PCO2, HCO3, BASEEXCESS, CAPRICE, BEB  Lab Results   Component Value Date    TSH 5.09 (H) 08/30/2018    TSH 5.14 (H) 05/18/2018     Lab Results   Component Value Date     (H) 08/30/2018     (H) 05/18/2018     Lab Results   Component Value Date    HGB 14.0 09/30/2014    HGB 13.5 04/24/2014     Lab Results   Component Value Date    BUN 23 08/30/2018    BUN 17 05/18/2018    CR 0.72 08/30/2018    CR 0.82 05/18/2018     No results found for: EMMA     Fifteen minutes spent with patient, all of which were spent face-to-face counseling, consulting, coordinating plan of care, going over sleep test results and chart review.          Reinier العراقي MD 10/4/2018   [facility]  [address], [place] [address] [phone] Clinic      CC: No ref. provider found  Copy to: Wilmar Boyer    "

## 2018-10-04 NOTE — PATIENT INSTRUCTIONS
MY TREATMENT INFORMATION FOR SLEEP APNEA-  Jenna GOOD     MY CONTACT NUMBERS ARE:  [phone]  DOCTOR : Reinier العراقي MD  SLEEP CENTER :  [place]  CPAP EQUIPMENT     You have moderate sleep apnea, auto-CPAP is prescribed for you.   AHI 0-5/hr normal  AHI 5-15 evens of hour is a mild sleep apnea  AHI 15-30/hr is moderate sleep apnea  AHI over 30/hr is severe sleep apnea)    CPAP therapy includes adaptation to a mask interface and a delivery of air pressure.    You will be provided with an auto-titrating CPAP with a pressure range of 8-16 cmH2O with heated humidity to limit nasal congestion. Adjust the heat level on humidifier to find a setting that prevents dry nose but does not cause condensation in the hose or mask. Use distilled water in the humidifier.    The CPAP has a ramp function that starts the pressure lower than your prescribed pressure and gradually increases it over a number of minutes.  This may make it easier to fall asleep.                  Try to use the CPAP every-night, all night, at least 7-8 hours each night.  Daytime naps are not advised, but use CPAP if taking naps. Many insurances require that we prove you are using the CPAP at least 4 hours on at least 70% of nights over a 30 day period. We have 90 days to meet those criteria.    Objective measure goal  Compliance  Goal >70% (preferrably 100%)  Leak   Goal < 10% (less than 24 L/min)  AHI  Goal < 5 events per hour   Usage  Goal 7-8 hours.         Patient was advised not to drive if drowsy or sleepy.                Discussed weight management and the impact of weight gain on sleep apnea.  Let me know if you snore or feel the pressure is too high.    - Try mask desensitization as below and do not remove mask headgear when you go to bathroom at night, but just unplug the hose.    You can get new supplies (mask, hose and filter) for your CPAP every 3-6 months, covered by insurance. You do not need to get supplies that often, but  "they are available if you would like them.  You may exchange the mask once within the first month if you feel the initial mask does not fit well.  Contact your medical equipment provider for equipment issues.  Please let me know if you have any return of snoring, daytime sleepiness or poor sleep quality. We will want to make sure your CPAP is adequately treating your apnea.  There is a website called CPAP.com that has accessories that may make CPAP use easier. Please visit it at your convenience.    Our phone number is [phone]    Follow-up 4-6 weeks after PAP usage.  Bring your CPAP machine with you to the follow up appointment.    Frequently asked questions:  1. What is Obstructive Sleep Apnea (BIJU)? BIJU is the most common type of sleep apnea. Apnea literally means, \"without breath.\" It is characterized by repetitive pauses in breathing, despite continued effort to breathe, and is usually associated with a reduction in blood oxygen saturation. Apneas can last 10 to over 60 seconds. It is caused by narrowing or collapse of the upper airway as muscles relax during sleep. Severity of sleep apnea is determined by frequency of breathing events and their effect on your sleep and oxygen levels determined during sleep testing.   2. What are the consequences of BIJU? Symptoms include: daytime sleepiness- possibly increasing the risk of falling asleep while driving, unrefreshing/restless sleep, snoring, insomnia, waking frequently to urinate, waking with heartburn or reflux, reduced concentration and memory, and morning headaches. Other health consequences may include development of high blood pressure and other cardiovascular disease in persons who are susceptible. Untreated BIJU  can contribute to heart disease, stroke and diabetes.   3. What are the treatment options? In most situations, sleep apnea is a lifelong disease that must be managed with daily therapy. Medications are not effective for sleep apnea and surgery " is generally not performed until other therapies have been tried. Therapy is usually tailored to the individual patient based on many factors including your wishes as well as severity of sleep apnea and severity of obesity. Continuous Positive Airway (CPAP) is the most reliable treatment. An oral device to hold your jaw forward is usually      IF I HAVE SLEEP APNEA.....  WHERE CAN I FIND MORE INFORMATION?    American Academy of Sleep Medicine Patient information on sleep disorders:  http://yoursleep.aasmnet.org    THINGS I SHOULD REMEMBER  In most situations, sleep apnea is a lifelong disease that must be managed with daily therapy. Untreated disease, when severe, may result in an increased risk for an array of problems from heart disease to mood changes, car accidents and shorter lifespan.    CPAP-  WHY AND HOW?                                    Continuous positive airway pressure, or CPAP, is the most effective treatment for obstructive sleep apnea. A decision to use CPAP is a major step forward in the pursuit of a healthier life. The successful use of CPAP will help you breathe easier, sleep better and live healthier. Using CPAP can be a positive experience if you keep these hoskins points in mind:  1. Commitment  CPAP is not a quick fix for your problem. It involves a long-term commitment to improve your sleep and your health.    2. Communication  Stay in close communication with both your sleep doctor and your CPAP supplier. Ask lots of questions and seek help when you need it.    3. Consistency  Use CPAP all night, every night and for every nap. You will receive the maximum health benefits from CPAP when you use it every time that you sleep. This will also make it easier for your body to adjust to the treatment.    4. Correction  The first machine and mask that you try may not be the best ones for you. Work with your sleep doctor and your CPAP supplier to make corrections to your equipment selection. Ask about  "trying a different type of machine or mask if you have ongoing problems. Make sure that your mask is a good fit and learn to use your equipment properly.    5. Challenge  Tell a family member or close friend to ask you each morning if you used your CPAP the previous night. Have someone to challenge you to give it your best effort.    6. Connection   Your adjustment to CPAP will be easier if you are able to connect with others who use the same treatment. Ask your sleep doctor if there is a support group in your area for people who have sleep apnea, or look for one on the Internet.    7. Comfort   Increase your level of comfort by using a saline spray, decongestant or heated humidifier if CPAP irritates your nose, mouth or throat. Use your unit's \"ramp\" setting to slowly get used to the air pressure level. There may be soft pads you can buy that will fit over your mask straps. Look on www.CPAP.com for accessories such as these straps, a pillow contoured for side-sleeping with CPAP, longer hoses, hose covers to reduce condensation, or stands to keep the hose out of your way.                                                              8. Cleaning   Clean your mask, tubing and headgear on a regular basis. Put this time in your schedule so that you don't forget to do it. Check and replace the filters for your CPAP unit and humidifier.    9. Completion   Although you are never finished with CPAP therapy, you should reward yourself by celebrating the completion of your first month of treatment. Expect this first month to be your hardest period of adjustment. It will involve some trial and error as you find the machine, mask and pressure settings that are right for you.    10. Continuation  After your first month of treatment, continue to make a daily commitment to use your CPAP all night, every night and for every nap.    CPAP-Tips to starting with success:  Begin using your CPAP for short periods of time during the day " while you watch TV or read.    Use CPAP every night and for every nap. Using it less often reduces the health benefits and makes it harder for your body to get used to it.    Newer CPAP models are virtually silent; however, if you find the sound of your CPAP machine to be bothersome, place the unit under your bed to dampen the sound.     Make small adjustments to your mask, tubing, straps and headgear until you get the right fit. Tightening the mask may actually worsen the leak.  If it leaves significant marks on your face or irritates the bridge of your nose, it may not be the best mask for you.  Speak with the person who supplied the mask and consider trying other masks.    Use a saline nasal spray to ease mild nasal congestion. Neti-Pot or saline nasal rinses may also help. Nasal gel sprays can help reduce nasal dryness.  Biotene mouthwash can be helpful to protect your teeth if you experience frequent dry mouth.  Dry mouth may be a sign of air escaping out of your mouth or out of the mask in the case of a full face mask.  Speak with your provider if you expect that is the case.     Take a nasal decongestant to relieve more severe nasal or sinus congestion.  Do not use Afrin (oxymetazoline) nasal spray more than 3 days in a row.  Speak with your sleep doctor if your nasal congestion is chronic.    Use a heated humidifier that fits your CPAP model to enhance your breathing comfort. Adjust the heat setting up if you get a dry nose or throat, down if you get condensation in the hose or mask.  Position the CPAP lower than you so that any condensation in the hose drains back into the machine rather than towards the mask.    Try a system that uses nasal pillows if traditional masks give you problems.    Clean your mask, tubing and headgear once a week. Make sure the equipment dries fully.    Regularly check and replace the filters for your CPAP unit and humidifier.    Work closely with your sleep doctor and your CPAP  "supplier to make sure that you have the machine, mask and air pressure setting that works best for you.        MASK DESENSITIZATION:    If you are experiencing some anxiety about trying a PAP mask or breathing with pressurized air try the following steps in sequence to get used to PAP. This is called \"desensitization\".    1.  Wear mask (disconnected from the device) for 60 minutes daily awake and use a distraction: Sit and watch TV, read, or listen to music with the mask on. Take the mask off and put it back on, as needed. This can be in a chair in the living room, for example.    2.  When you can use the mask without taking it off for 60 minutes and without anxiety, then proceed to step 3.    3.  Attach the mask to the PAP device, and switch the unit  on  and practice breathing through the mask for one hour while watching television, reading or performing some other sedentary, distracting activity.     4.  Once you are comfortable wearing PAP for 30-60 minutes without anxiety while distracted with an activity, try to use it in bed. You can continue distraction in bed by watching TV, reading, listening to music or an audio book, etc.  The main goal is to  not think about using PAP  but pay attention to relaxing.    5. Use the PAP during scheduled one-hour naps at home.    6. Use PAP during initial 3-4 hours of nocturnal sleep.    7. Use PAP through an entire night of sleep.    Weight Loss:    Weight management is a personal decision.  If you are interested in exploring weight loss strategies, the following discussion covers the impact on weight loss on sleep apnea and the approaches that may be successful.    Weight loss decreases severity of sleep apnea in most people with obesity. For those with mild obesity who have developed snoring with weight gain, even 15-30 pound weight loss can improve and occasionally eliminate sleep apnea.  Structured and life-long dietary and health habits are necessary to lose weight " and keep healthier weight levels.     Though there may be significant health benefits from weight loss, long-term weight loss is very difficult to achieve- studies show success with dietary management in less than 10% of people. In addition, substantial weight loss may require years of dietary control and may be difficult if patients have severe obesity. In these cases, surgical management may be considered.  Finally, older individuals who have tolerated obesity without health complications may be less likely to benefit from weight loss strategies.    Your BMI is Body mass index is 28.84 kg/(m^2).  Body mass index (BMI) is one way to tell whether you are at a healthy weight, overweight, or obese. It measures your weight in relation to your height.  A BMI of 18.5 to 24.9 is in the healthy range. A person with a BMI of 25 to 29.9 is considered overweight, and someone with a BMI of 30 or greater is considered obese. More than two-thirds of American adults are considered overweight or obese.  Being overweight or obese increases the risk for further weight gain. Excess weight may lead to heart disease and diabetes.  Creating and following plans for healthy eating and physical activity may help you improve your health.  Weight control is part of healthy lifestyle and includes exercise, emotional health, and healthy eating habits. Careful eating habits lifelong are the mainstay of weight control. Though there are significant health benefits from weight loss, long-term weight loss with diet alone may be very difficult to achieve- studies show long-term success with dietary management in less than 10% of people. Attaining a healthy weight may be especially difficult to achieve in those with severe obesity. In some cases, medications, devices and surgical management might be considered.  What can you do?  If you are overweight or obese and are interested in methods for weight loss, you should discuss this with your provider.      Consider reducing daily calorie intake by 500 calories.     Keep a food journal.     Avoiding skipping meals, consider cutting portions instead.    Diet combined with exercise helps maintain muscle while optimizing fat loss. Strength training is particularly important for building and maintaining muscle mass. Exercise helps reduce stress, increase energy, and improves fitness. Increasing exercise without diet control, however, may not burn enough calories to loose weight.       Start walking three days a week 10-20 minutes at a time    Work towards walking thirty minutes five days a week     Eventually, increase the speed of your walking for 1-2 minutes at time    In addition, we recommend that you review healthy lifestyles and methods for weight loss available through the National Institutes of Health patient information sites:  http://win.niddk.nih.gov/publications/index.htm    And look into health and wellness programs that may be available through your health insurance provider, employer, local community center, or timmy club.    Weight management plan: Patient was referred to their PCP to discuss a diet and exercise plan.    Your blood pressure was checked while you were in clinic today.  Please read the guidelines below about what these numbers mean and what you should do about them.  Your systolic blood pressure is the top number.  This is the pressure when the heart is pumping.  Your diastolic blood pressure is the bottom number.  This is the pressure in between beats.  If your systolic blood pressure is less than 120 and your diastolic blood pressure is less than 80, then your blood pressure is normal. There is nothing more that you need to do about it  If your systolic blood pressure is 120-139 or your diastolic blood pressure is 80-89, your blood pressure may be higher than it should be.  You should have your blood pressure re-checked within a year by a primary care provider.  If your systolic blood  pressure is 140 or greater or your diastolic blood pressure is 90 or greater, you may have high blood pressure.  High blood pressure is treatable, but if left untreated over time it can put you at risk for heart attack, stroke, or kidney failure.  You should have your blood pressure re-checked by a primary care provider within the next four weeks.

## 2018-10-22 ENCOUNTER — TELEPHONE (OUTPATIENT)
Dept: SLEEP MEDICINE | Facility: CLINIC | Age: 63
End: 2018-10-22

## 2018-10-22 NOTE — TELEPHONE ENCOUNTER
Left patient a voicemail to call [facility] at [phone] to schedule CPAP setup appointment.

## 2018-10-23 ENCOUNTER — TELEPHONE (OUTPATIENT)
Dept: SLEEP MEDICINE | Facility: CLINIC | Age: 63
End: 2018-10-23

## 2018-10-23 NOTE — TELEPHONE ENCOUNTER
Patient called into [facility] to get scheduled for pap setup. Called patient back, scheduled setup for 11/1/18 at 10 AM at [facility] showroom. Provided patient with suite number, as she is familiar with  [facility], as well as,  showroom phone number. Instructed patient to call our main line for any further questions or concerns.

## 2018-10-24 DIAGNOSIS — E11.9 TYPE 2 DIABETES MELLITUS WITHOUT COMPLICATION, WITHOUT LONG-TERM CURRENT USE OF INSULIN (H): ICD-10-CM

## 2018-10-24 NOTE — TELEPHONE ENCOUNTER
"Requested Prescriptions   Pending Prescriptions Disp Refills     metFORMIN (GLUCOPHAGE) 500 MG tablet [Pharmacy Med Name: MetFORMIN HCl Oral Tablet 500 MG] 60 tablet 0     Sig: TAKE ONE TABLET BY MOUTH TWICE DAILY WITH MEALS.    Biguanide Agents Failed    10/24/2018  7:01 AM       Failed - Blood pressure less than 140/90 in past 6 months    BP Readings from Last 3 Encounters:   10/04/18 159/68   09/05/18 150/68   08/29/18 138/88                Passed - Patient has documented LDL within the past 12 mos.    Recent Labs   Lab Test  08/30/18   0930   LDL  79            Passed - Patient has had a Microalbumin in the past 15 mos.    Recent Labs   Lab Test  08/30/18   0930   MICROL  14   UMALCR  15.84            Passed - Patient is age 10 or older       Passed - Patient has documented A1c within the specified period of time.    If HgbA1C is 8 or greater, it needs to be on file within the past 3 months.  If less than 8, must be on file within the past 6 months.     Recent Labs   Lab Test  08/30/18   0930   A1C  6.5*            Passed - Patient's CR is NOT>1.4 OR Patient's EGFR is NOT<45 within past 12 mos.    Recent Labs   Lab Test  08/30/18   0930   GFRESTIMATED  81   GFRESTBLACK  >90       Recent Labs   Lab Test  08/30/18   0930   CR  0.72            Passed - Patient does NOT have a diagnosis of CHF.       Passed - Patient is not pregnant       Passed - Patient has not had a positive pregnancy test within the past 12 mos.        Passed - Recent (6 mo) or future (30 days) visit within the authorizing provider's specialty    Patient had office visit in the last 6 months or has a visit in the next 30 days with authorizing provider or within the authorizing provider's specialty.  See \"Patient Info\" tab in inbasket, or \"Choose Columns\" in Meds & Orders section of the refill encounter.            Last Written Prescription Date:  7/31/2018  Last Fill Quantity: 180,  # refills: 0   Last office visit: 7/16/2018 with prescribing " provider:  7/16/2018   Future Office Visit:

## 2018-10-24 NOTE — TELEPHONE ENCOUNTER
Metformin refilled. HAS had high BP readings the last 3 clinics since saw me last when seeing other providers. Would recommend pt make f/u appt in clinic with me to recheck blood pressure and discuss adjustments to medication treatment if remaining elevated. Call pt and assist with scheduling appt

## 2018-10-25 NOTE — TELEPHONE ENCOUNTER
The patient has been notified of this information and all questions answered. Patient will call to schedule appointment after she checks your schedule.

## 2018-11-01 ENCOUNTER — DOCUMENTATION ONLY (OUTPATIENT)
Dept: SLEEP MEDICINE | Facility: CLINIC | Age: 63
End: 2018-11-01

## 2018-11-01 NOTE — PROGRESS NOTES
Patient was offered choice of vendor and chose [facility].  Patient Jenna Card was set up at [facility] on November 1, 2018. Patient received a Resmed AirSense 10 Auto. Pressures were set at 8-16 cm H2O.   Patient s ramp is 5 cm H2O for Auto and FLEX/EPR is 3.  Patient received a Resmed Airfit F20   Full Face mask Size Small, heated tubing and heated humidifier.  Patient is enrolled in the STM Program and does not need to meet compliance. Patient doesn't have a follow up.   RATNA JUARES

## 2018-11-04 ENCOUNTER — DOCUMENTATION ONLY (OUTPATIENT)
Dept: SLEEP MEDICINE | Facility: CLINIC | Age: 63
End: 2018-11-04

## 2018-11-05 DIAGNOSIS — E11.9 TYPE 2 DIABETES MELLITUS WITHOUT COMPLICATION, WITHOUT LONG-TERM CURRENT USE OF INSULIN (H): ICD-10-CM

## 2018-11-05 NOTE — PROGRESS NOTES
3 DAY STM VISIT    Diagnostic AHI: 16 PSG    Patient contacted for 3 day STM visit  Message left for patient to return call     Device type: Auto-CPAP  PAP settings from order::  CPAP min 8 cm  H20       CPAP max 16 cm  H20  Mask type:    Full Face Mask     Device settings from machine: No usage yet  Assessment: No usage reporting but has a profile in [employer].  Action plan: Pt to have f/u 14 day STM visit.  Patient has a follow up visit scheduled:   no.

## 2018-11-06 ENCOUNTER — TELEPHONE (OUTPATIENT)
Dept: INTERNAL MEDICINE | Facility: CLINIC | Age: 63
End: 2018-11-06

## 2018-11-06 DIAGNOSIS — E11.9 TYPE 2 DIABETES MELLITUS WITHOUT COMPLICATION, WITHOUT LONG-TERM CURRENT USE OF INSULIN (H): ICD-10-CM

## 2018-11-06 RX ORDER — LANCETS
EACH MISCELLANEOUS
Qty: 100 EACH | Refills: 0 | Status: SHIPPED | OUTPATIENT
Start: 2018-11-06 | End: 2021-01-02

## 2018-11-06 NOTE — TELEPHONE ENCOUNTER
Fax received from [facility]. Accu-chek no longer covered. Please send new Rx for soft clix lancets

## 2018-11-16 ENCOUNTER — DOCUMENTATION ONLY (OUTPATIENT)
Dept: SLEEP MEDICINE | Facility: CLINIC | Age: 63
End: 2018-11-16

## 2018-11-16 NOTE — PROGRESS NOTES
14 DAY STM VISIT    Diagnostic AHI: 16 PSG    Message left for patient to return call     Assessment: Pt not meeting objective benchmarks for compliance     Action plan: waiting for patient to return call.  and pt to have 30 day STM visit.    Device type: Auto-CPAP  PAP settings: CPAP min 8 cm  H20     CPAP max 16 cm  H20  Mask type:  Full Face Mask  Objective measures: No usage yet

## 2018-11-19 NOTE — PROGRESS NOTES
Patient returned call.     Subjective measures: Pt left voicemail stating that she has been sick since she got her CPAP and so she hasn't started using it. She does plan to start once she is over her upper respiratory infection.      Action plan:pt to have 30 day [facility] visit.

## 2018-11-29 ENCOUNTER — OFFICE VISIT (OUTPATIENT)
Dept: INTERNAL MEDICINE | Facility: CLINIC | Age: 63
End: 2018-11-29
Payer: COMMERCIAL

## 2018-11-29 VITALS
SYSTOLIC BLOOD PRESSURE: 150 MMHG | DIASTOLIC BLOOD PRESSURE: 80 MMHG | OXYGEN SATURATION: 97 % | WEIGHT: 187 LBS | BODY MASS INDEX: 28.84 KG/M2 | HEART RATE: 58 BPM | RESPIRATION RATE: 16 BRPM | TEMPERATURE: 98.1 F

## 2018-11-29 DIAGNOSIS — I10 ESSENTIAL HYPERTENSION: ICD-10-CM

## 2018-11-29 DIAGNOSIS — J06.9 UPPER RESPIRATORY TRACT INFECTION, UNSPECIFIED TYPE: ICD-10-CM

## 2018-11-29 DIAGNOSIS — G47.33 OSA (OBSTRUCTIVE SLEEP APNEA): ICD-10-CM

## 2018-11-29 DIAGNOSIS — E11.9 TYPE 2 DIABETES MELLITUS WITHOUT COMPLICATION, WITHOUT LONG-TERM CURRENT USE OF INSULIN (H): Primary | ICD-10-CM

## 2018-11-29 DIAGNOSIS — E03.9 HYPOTHYROIDISM, UNSPECIFIED TYPE: ICD-10-CM

## 2018-11-29 PROCEDURE — 99214 OFFICE O/P EST MOD 30 MIN: CPT | Performed by: INTERNAL MEDICINE

## 2018-11-29 RX ORDER — ALBUTEROL SULFATE 90 UG/1
2 AEROSOL, METERED RESPIRATORY (INHALATION) EVERY 6 HOURS PRN
Qty: 1 INHALER | Refills: 11 | Status: SHIPPED | OUTPATIENT
Start: 2018-11-29 | End: 2022-08-19

## 2018-11-29 RX ORDER — LEVOTHYROXINE SODIUM 75 UG/1
75 TABLET ORAL DAILY
Qty: 90 TABLET | Refills: 3 | Status: SHIPPED | OUTPATIENT
Start: 2018-11-29 | End: 2019-11-16

## 2018-11-29 RX ORDER — FLUTICASONE PROPIONATE 110 UG/1
2 AEROSOL, METERED RESPIRATORY (INHALATION) 2 TIMES DAILY
Qty: 1 INHALER | Refills: 1 | Status: CANCELLED | OUTPATIENT
Start: 2018-11-29

## 2018-11-29 RX ORDER — AZELASTINE 1 MG/ML
2 SPRAY, METERED NASAL 2 TIMES DAILY
Qty: 1 BOTTLE | Refills: 11 | Status: SHIPPED | OUTPATIENT
Start: 2018-11-29 | End: 2019-01-17

## 2018-11-29 RX ORDER — AZITHROMYCIN 250 MG/1
TABLET, FILM COATED ORAL
Qty: 6 TABLET | Refills: 0 | Status: SHIPPED | OUTPATIENT
Start: 2018-11-29 | End: 2019-01-17

## 2018-11-29 NOTE — MR AVS SNAPSHOT
After Visit Summary   11/29/2018    Jenna Card    MRN: [id number]           Patient Information     Date Of Birth          1955        Visit Information        Provider Department      11/29/2018 10:00 AM Wilmar Boyer MD [facility]        Today's Diagnoses     Type 2 diabetes mellitus without complication, without long-term current use of insulin (H)    -  1    Hypothyroidism, unspecified type        Upper respiratory tract infection, unspecified type          Care Instructions     Fasting labs 12/3/18   Change Levothyroxine to 75mcg tab, 1 tab daily in Am for thyroid   Nonfasting thyroid and A1C lab in 7-8 weeks   Stop Flovent inhaler    Azelastine 2 spray each nostril twice a day for nasal drainage. If resolved after a few weeks, then may try stopping   Azithromycin 2 tabs  today, then 1 tab daily for 4 days (antibiotic)  Warm liquids for voice/throat  OTC Delsym 2 tsp  Twice a day as needed for cough suppression  Continue other medications          Follow-ups after your visit        Your next 10 appointments already scheduled     Dec 03, 2018  9:30 AM CST   LAB with RU LAB   [facility] ([email or web address])    [address]  [place] [facility] [address] [phone]           Please do not eat 10-12 hours before your appointment if you are coming in fasting for labs on lipids, cholesterol, or glucose (sugar). This does not apply to pregnant women. Water, hot tea and black coffee (with nothing added) are okay. Do not drink other fluids, diet soda or chew gum.            Jan 23, 2019   Procedure with Santos Conklin MD   [facility] (--)    [address]  [facility] [id number] [phone]              Future tests that were ordered for you today     Open Future Orders        Priority Expected Expires Ordered    Comprehensive metabolic panel Routine 12/3/2018 11/29/2019  11/29/2018    Hemoglobin A1c Routine 1/29/2019 11/29/2019 11/29/2018    TSH with free T4 reflex Routine 1/29/2019 11/29/2019 11/29/2018            Who to contact     If you have questions or need follow up information about today's clinic visit or your schedule please contact [facility] directly at [phone].  Normal or non-critical lab and imaging results will be communicated to you by [facility], letter or phone within 4 business days after the clinic has received the results. If you do not hear from us within 7 days, please contact the clinic through [facility] or phone. If you have a critical or abnormal lab result, we will notify you by phone as soon as possible.  Submit refill requests through [facility] or call your pharmacy and they will forward the refill request to us. Please allow 3 business days for your refill to be completed.          Additional Information About Your Visit        [facility] Information     [facility] gives you secure access to your electronic health record. If you see a primary care provider, you can also send messages to your care team and make appointments. If you have questions, please call your primary care clinic.  If you do not have a primary care provider, please call [phone] and they will assist you.        Care EveryWhere ID     This is your Care EveryWhere ID. This could be used by other organizations to access your [place] medical records  [id number]        Your Vitals Were     Pulse Temperature Respirations Pulse Oximetry BMI (Body Mass Index)       58 98.1  F (36.7  C) (Oral) 16 97% 28.84 kg/m2        Blood Pressure from Last 3 Encounters:   11/29/18 150/80   10/04/18 159/68   09/05/18 150/68    Weight from Last 3 Encounters:   11/29/18 187 lb (84.8 kg)   10/04/18 187 lb (84.8 kg)   09/05/18 187 lb 3.2 oz (84.9 kg)                 Today's Medication Changes          These changes are accurate as of 11/29/18 10:52 AM.  If you have any questions, ask  your nurse or doctor.               Start taking these medicines.        Dose/Directions    azelastine 0.1 % nasal spray   Commonly known as:  ASTELIN   Used for:  Upper respiratory tract infection, unspecified type   Started by:  Wilmar Boyer MD        Dose:  2 spray   Spray 2 sprays into both nostrils 2 times daily   Quantity:  1 Bottle   Refills:  11       azithromycin 250 MG tablet   Commonly known as:  ZITHROMAX   Used for:  Upper respiratory tract infection, unspecified type   Started by:  Wilmar Boyer MD        Two tablets first day, then one tablet daily for four days.   Quantity:  6 tablet   Refills:  0         These medicines have changed or have updated prescriptions.        Dose/Directions    levothyroxine 75 MCG tablet   Commonly known as:  SYNTHROID/LEVOTHROID   This may have changed:    - medication strength  - how much to take  - how to take this  - when to take this  - additional instructions   Used for:  Hypothyroidism, unspecified type   Changed by:  Wilmar Boyer MD        Dose:  75 mcg   Take 1 tablet (75 mcg) by mouth daily   Quantity:  90 tablet   Refills:  3       triamcinolone 55 MCG/ACT nasal aerosol   Commonly known as:  NASACORT AQ   This may have changed:    - when to take this  - reasons to take this   Used for:  Post-nasal drainage        Dose:  2 spray   Spray 2 sprays into both nostrils daily   Quantity:  1 Bottle   Refills:  0            Where to get your medicines      These medications were sent to [id number] - [place], [place] - [address]  [address] [address]    Hours:  9Am-9Pm (M-F) 9Am-6Pm (S&S) Phone:  [phone]     albuterol 108 (90 Base) MCG/ACT inhaler    azelastine 0.1 % nasal spray    azithromycin 250 MG tablet    levothyroxine 75 MCG tablet                Primary Care Provider Office Phone # Fax #    Wilmar Boyer [phone]       [address] [place] [address]        Equal Access to Services     EMME SON AH:  Hadii aad ku hadmickeyo Sosarmadali, waaxda luqadaha, qaybta kaalmada jannet, millicent robertrachael ladd. So [facility] [phone].    ATENCIÓN: Si rossana watts, tiene a weiss disposición servicios gratuitos de asistencia lingüística. Llame al [phone].    We comply with applicable federal civil rights laws and [place] laws. We do not discriminate on the basis of race, color, national origin, age, disability, sex, sexual orientation, or gender identity.            Thank you!     Thank you for choosing [facility]  for your care. Our goal is always to provide you with excellent care. Hearing back from our patients is one way we can continue to improve our services. Please take a few minutes to complete the written survey that you may receive in the mail after your visit with us. Thank you!             Your Updated Medication List - Protect others around you: Learn how to safely use, store and throw away your medicines at www.disposemymeds.org.          This list is accurate as of 11/29/18 10:52 AM.  Always use your most recent med list.                   Brand Name Dispense Instructions for use Diagnosis    albuterol 108 (90 Base) MCG/ACT inhaler    PROAIR HFA/PROVENTIL HFA/VENTOLIN HFA    1 Inhaler    Inhale 2 puffs into the lungs every 6 hours as needed for shortness of breath / dyspnea or wheezing    Upper respiratory tract infection, unspecified type       allopurinol 300 MG tablet    ZYLOPRIM    30 tablet    TAKE ONE TABLET BY MOUTH ONE TIME DAILY    Chronic gout without tophus, unspecified cause, unspecified site       aspirin 81 MG tablet    ASA     Take 81 mg by mouth daily        atenolol 50 MG tablet    TENORMIN    60 tablet    TAKE TWO TABLETS BY MOUTH DAILY    Essential hypertension       atorvastatin 20 MG tablet    LIPITOR    90 tablet    Take 1 tablet (20 mg) by mouth daily    Coronary artery disease involving native coronary artery of native heart without angina  pectoris       azelastine 0.1 % nasal spray    ASTELIN    1 Bottle    Spray 2 sprays into both nostrils 2 times daily    Upper respiratory tract infection, unspecified type       azithromycin 250 MG tablet    ZITHROMAX    6 tablet    Two tablets first day, then one tablet daily for four days.    Upper respiratory tract infection, unspecified type       blood glucose monitoring lancets     100 each    Use to test blood sugar 1 times daily or as directed.    Type 2 diabetes mellitus without complication, without long-term current use of insulin (H)       blood glucose monitoring meter device kit           blood glucose monitoring test strip    ACCU-CHEK MARSHALL PLUS    1 Box    Check blood sugars once daily    Type 2 diabetes mellitus without complication, without long-term current use of insulin (H)       fish oil-omega-3 fatty acids 1000 MG capsule      Take 1 capsule (1 g) by mouth daily    Hyperlipidemia LDL goal <100       hydrochlorothiazide 25 MG tablet    HYDRODIURIL    90 tablet    TAKE ONE TABLET BY MOUTH IN THE MORNING    Essential hypertension       levothyroxine 75 MCG tablet    SYNTHROID/LEVOTHROID    90 tablet    Take 1 tablet (75 mcg) by mouth daily    Hypothyroidism, unspecified type       losartan 100 MG tablet    COZAAR    90 tablet    TAKE HALF TABLET BY MOUTH TWICE DAILY    Essential hypertension       metFORMIN 500 MG tablet    GLUCOPHAGE    180 tablet    TAKE ONE TABLET BY MOUTH TWICE DAILY WITH MEALS.    Type 2 diabetes mellitus without complication, without long-term current use of insulin (H)       triamcinolone 55 MCG/ACT nasal aerosol    NASACORT AQ    1 Bottle    Spray 2 sprays into both nostrils daily    Post-nasal drainage

## 2018-11-29 NOTE — PROGRESS NOTES
SUBJECTIVE:   Jenna Card is a 63 year old female who presents to clinic today for the following health issues:    Chief Complaint   Patient presents with     Hypertension     Diabetes     Hyperlipidemia     Thyroid Problem       Diabetes Follow-up    Patient is checking blood sugars: twice daily.    Blood sugar testing frequency justification: Per Provider  Results are as follows:         am - 100's         bedtime - 90's to 100's    Diabetic concerns: None      Symptoms of hypoglycemia (low blood sugar): none     Paresthesias (numbness or burning in feet) or sores: No     Date of last diabetic eye exam: No Recent Eye Exam on File    BP Readings from Last 2 Encounters:   10/04/18 159/68   09/05/18 150/68     Hemoglobin A1C (%)   Date Value   08/30/2018 6.5 (H)   05/18/2018 6.6 (H)     LDL Cholesterol Calculated (mg/dL)   Date Value   08/30/2018 79   07/06/2017 85       Diabetes Management Resources  Hyperlipidemia Follow-Up      Rate your low fat/cholesterol diet?: fair    Taking statin?  Yes, no muscle aches from statin    Other lipid medications/supplements?:  none    Hypertension Follow-up      Outpatient blood pressures are being checked at home.  Results are within normal limits.    Low Salt Diet: not monitoring salt    Hypothyroidism Follow-up      Since last visit, patient describes the following symptoms: Weight stable, no hair loss, no skin changes, no constipation, no loose stools      Amount of exercise or physical activity: Minimal    Problems taking medications regularly: No    Medication side effects: none    Diet: low fat/cholesterol      Pt's past medical history, family history, habits, medications and allergies were reviewed with the patient today.  See snap shot for  HCM status. Most recent lab results reviewed with pt. Problem list and histories reviewed & adjusted, as indicated.  Additional history as below:       Lab Results   Component Value Date     08/30/2018     Lab Results  "  Component Value Date    A1C 6.5 08/30/2018     Lab Results   Component Value Date    CHOL 156 08/30/2018     Lab Results   Component Value Date    LDL 79 08/30/2018     Lab Results   Component Value Date    HDL 39 08/30/2018     Lab Results   Component Value Date    TRIG 191 08/30/2018     Lab Results   Component Value Date    CR 0.72 08/30/2018     Lab Results   Component Value Date    ALT 46 08/30/2018     Lab Results   Component Value Date    AST 49 08/30/2018     Lab Results   Component Value Date    MICROL 14 08/30/2018     Lab Results   Component Value Date    TSH 5.09 08/30/2018          Will be having hysterectomy and pelvic lift in Jan 2019  Denies CP  abdominal pain, polyuria, polydipsia, vision changes, extremity numbness/parasthesias or skin problems.   URI sx for 1 month.  Sx include occ clear nasal discharge. Rare productive cough that is yellowish.  No SOB. No current F/C. Denies ear pain   HAS not started CPAP yet due to URI. May be contributing to elevated blood pressure         Additional ROS:   Constitutional, HEENT, Cardiovascular, Pulmonary, GI and , Neuro, MSK and Psych review of systems/symptoms are otherwise negative or unchanged from previous, except as noted above.      OBJECTIVE:  /86  Pulse 58  Temp 98.1  F (36.7  C) (Oral)  Resp 16  Wt 187 lb (84.8 kg)  SpO2 97%  BMI 28.84 kg/m2   Estimated body mass index is 28.84 kg/(m^2) as calculated from the following:    Height as of 10/4/18: 5' 7.52\" (1.715 m).    Weight as of this encounter: 187 lb (84.8 kg).  Eye: PERRL, EOMI  HENT: ear canals and TM's normal and nose and mouth without ulcers or lesions. No sinus tenderness to palpation. Nasal drainage clear  Neck: no adenopathy. Thyroid normal to palpation. No bruits  Pulm: Lungs clear to auscultation posterior. Mld ant mid rhonchi that clear with cough  CV: Regular rates and rhythm  GI: Soft, nontender, Normal active bowel sounds, No hepatosplenomegaly or masses palpable  Ext: " Peripheral pulses intact. No edema.  Neuro: Normal strength and tone, sensory exam grossly normal    Assessment/Plan: (See plan discussion below for further details)  1. Type 2 diabetes mellitus without complication, without long-term current use of insulin (H)  Controlled. Repeat labs in 7-8 weeks  - Comprehensive metabolic panel; Future  - Hemoglobin A1c; Future    2. Hypothyroidism, unspecified type  Thyroid function too low. See dose change below  - levothyroxine (SYNTHROID/LEVOTHROID) 75 MCG tablet; Take 1 tablet (75 mcg) by mouth daily  Dispense: 90 tablet; Refill: 3  - TSH with free T4 reflex; Future    3. Upper respiratory tract infection, unspecified type  Given sx for 1 month, will treat as below  - albuterol (PROAIR HFA/PROVENTIL HFA/VENTOLIN HFA) 108 (90 Base) MCG/ACT inhaler; Inhale 2 puffs into the lungs every 6 hours as needed for shortness of breath / dyspnea or wheezing  Dispense: 1 Inhaler; Refill: 11  - azithromycin (ZITHROMAX) 250 MG tablet; Two tablets first day, then one tablet daily for four days.  Dispense: 6 tablet; Refill: 0  - azelastine (ASTELIN) 0.1 % nasal spray; Spray 2 sprays into both nostrils 2 times daily  Dispense: 1 Bottle; Refill: 11    4. Essential hypertension  BP elevated. Will recheck when see pt back in clinic in 1 month for preop and after pt has restarted CPAP. If still > 130/80 at that time, will start Amlodipine    5. BIJU (obstructive sleep apnea)   Pt to start CPAP once URI sx clear    Plan discussion:   Fasting labs 12/3/18   Change Levothyroxine to 75mcg tab, 1 tab daily in Am for thyroid   Nonfasting thyroid and A1C lab in 7-8 weeks   Stop Flovent inhaler    Azelastine 2 spray each nostril twice a day for nasal drainage. If resolved after a few weeks, then may try stopping   Azithromycin 2 tabs  today, then 1 tab daily for 4 days (antibiotic)  Warm liquids for voice/throat  OTC Delsym 2 tsp  Twice a day as needed for cough suppression  Continue other medications        Wilmar Boyer MD  Internal Medicine Department  [facility]

## 2018-12-03 ENCOUNTER — DOCUMENTATION ONLY (OUTPATIENT)
Dept: SLEEP MEDICINE | Facility: CLINIC | Age: 63
End: 2018-12-03

## 2018-12-03 DIAGNOSIS — I25.10 CORONARY ARTERY DISEASE INVOLVING NATIVE CORONARY ARTERY OF NATIVE HEART WITHOUT ANGINA PECTORIS: ICD-10-CM

## 2018-12-03 LAB
CHOLEST SERPL-MCNC: 138 MG/DL
HDLC SERPL-MCNC: 41 MG/DL
LDLC SERPL CALC-MCNC: 61 MG/DL
NONHDLC SERPL-MCNC: 97 MG/DL
TRIGL SERPL-MCNC: 182 MG/DL

## 2018-12-03 PROCEDURE — 36415 COLL VENOUS BLD VENIPUNCTURE: CPT | Performed by: INTERNAL MEDICINE

## 2018-12-03 PROCEDURE — 80061 LIPID PANEL: CPT | Performed by: INTERNAL MEDICINE

## 2018-12-03 NOTE — PROGRESS NOTES
30 DAY [facility] VISIT    Diagnostic AHI: 16 PSG    Message left for patient to return call     Assessment: Pt not meeting objective benchmarks for compliance     Action plan: waiting for patient to return call.  and 2 week STM recheck appt scheduled  Patient has not scheduled a follow up visit with Dr. العراقي.   Device type: Auto-CPAP  PAP settings: CPAP min 8 cm  H20     CPAP max 16 cm  H20  Mask type:  Full Face Mask  Objective measures: No usage yet

## 2018-12-13 ENCOUNTER — TELEPHONE (OUTPATIENT)
Dept: CARDIOLOGY | Facility: CLINIC | Age: 63
End: 2018-12-13

## 2018-12-13 NOTE — TELEPHONE ENCOUNTER
Lipid panel results noted from 12/3/18:     Chol HDL LDL lv Chol/HDL TG   12/03/18 0928 138 41 61 -- 182   08/30/18 0930 156 39 79 -- 191         Ordered at [facility] on 8/29/18 for assess after adding atorvastatin 20 mg daily. Will route to Dr. Lozano for review.

## 2018-12-19 ENCOUNTER — DOCUMENTATION ONLY (OUTPATIENT)
Dept: SLEEP MEDICINE | Facility: CLINIC | Age: 63
End: 2018-12-19

## 2018-12-19 NOTE — PROGRESS NOTES
[facility] Recheck Visit     Subjective measures:   Pt states she hasn't started using it because she has been sick.  She is now feeling better but is going to wait until after the holidays to start using her machine.  She will call back and let us know when she starts using it.    Assessment: Pt not meeting objective benchmarks for compliance     Action plan: waiting for patient to call so we can re-enroll her in [facility]   Patient does not have a follow up visit with Dr. العراقي scheduled and will wait to schedule when she starts using it again.   Device type:  Auto-CPAP     PAP settings: CPAP min: 8 cm  H20     CPAP max:16cm  H20    Objective measures: No usage yet    Diagnostic AHI:   PSG AHI: 16       Objective measure goal  Compliance   Goal >70%  Leak   Goal < 10%  AHI  Goal < 5  Usage  Goal >240

## 2019-01-15 DIAGNOSIS — E03.9 HYPOTHYROIDISM, UNSPECIFIED TYPE: ICD-10-CM

## 2019-01-15 DIAGNOSIS — E11.9 TYPE 2 DIABETES MELLITUS WITHOUT COMPLICATION, WITHOUT LONG-TERM CURRENT USE OF INSULIN (H): ICD-10-CM

## 2019-01-15 LAB
ALBUMIN SERPL-MCNC: 3.8 G/DL (ref 3.4–5)
ALP SERPL-CCNC: 63 U/L (ref 40–150)
ALT SERPL W P-5'-P-CCNC: 43 U/L (ref 0–50)
ANION GAP SERPL CALCULATED.3IONS-SCNC: 7 MMOL/L (ref 3–14)
AST SERPL W P-5'-P-CCNC: 47 U/L (ref 0–45)
BILIRUB SERPL-MCNC: 0.6 MG/DL (ref 0.2–1.3)
BUN SERPL-MCNC: 20 MG/DL (ref 7–30)
CALCIUM SERPL-MCNC: 9.4 MG/DL (ref 8.5–10.1)
CHLORIDE SERPL-SCNC: 105 MMOL/L (ref 94–109)
CO2 SERPL-SCNC: 26 MMOL/L (ref 20–32)
CREAT SERPL-MCNC: 0.78 MG/DL (ref 0.52–1.04)
GFR SERPL CREATININE-BSD FRML MDRD: 80 ML/MIN/{1.73_M2}
GLUCOSE SERPL-MCNC: 125 MG/DL (ref 70–99)
HBA1C MFR BLD: 7 % (ref 0–5.6)
POTASSIUM SERPL-SCNC: 4.8 MMOL/L (ref 3.4–5.3)
PROT SERPL-MCNC: 7.1 G/DL (ref 6.8–8.8)
SODIUM SERPL-SCNC: 138 MMOL/L (ref 133–144)
TSH SERPL DL<=0.005 MIU/L-ACNC: 0.88 MU/L (ref 0.4–4)

## 2019-01-15 PROCEDURE — 84443 ASSAY THYROID STIM HORMONE: CPT | Performed by: INTERNAL MEDICINE

## 2019-01-15 PROCEDURE — 83036 HEMOGLOBIN GLYCOSYLATED A1C: CPT | Performed by: INTERNAL MEDICINE

## 2019-01-15 PROCEDURE — 36415 COLL VENOUS BLD VENIPUNCTURE: CPT | Performed by: INTERNAL MEDICINE

## 2019-01-15 PROCEDURE — 80053 COMPREHEN METABOLIC PANEL: CPT | Performed by: INTERNAL MEDICINE

## 2019-01-16 RX ORDER — ASPIRIN 81 MG/1
81 TABLET ORAL AT BEDTIME
COMMUNITY

## 2019-01-17 ENCOUNTER — OFFICE VISIT (OUTPATIENT)
Dept: INTERNAL MEDICINE | Facility: CLINIC | Age: 64
End: 2019-01-17
Payer: COMMERCIAL

## 2019-01-17 VITALS
TEMPERATURE: 98.5 F | DIASTOLIC BLOOD PRESSURE: 78 MMHG | HEART RATE: 59 BPM | HEIGHT: 67 IN | SYSTOLIC BLOOD PRESSURE: 146 MMHG | BODY MASS INDEX: 29.03 KG/M2 | WEIGHT: 185 LBS | OXYGEN SATURATION: 98 % | RESPIRATION RATE: 16 BRPM

## 2019-01-17 DIAGNOSIS — I10 ESSENTIAL HYPERTENSION: ICD-10-CM

## 2019-01-17 DIAGNOSIS — I25.10 CORONARY ARTERY DISEASE INVOLVING NATIVE CORONARY ARTERY OF NATIVE HEART WITHOUT ANGINA PECTORIS: ICD-10-CM

## 2019-01-17 DIAGNOSIS — E11.9 TYPE 2 DIABETES MELLITUS WITHOUT COMPLICATION, WITHOUT LONG-TERM CURRENT USE OF INSULIN (H): ICD-10-CM

## 2019-01-17 DIAGNOSIS — N81.9 FEMALE GENITAL PROLAPSE, UNSPECIFIED TYPE: ICD-10-CM

## 2019-01-17 DIAGNOSIS — G47.33 OSA ON CPAP: ICD-10-CM

## 2019-01-17 DIAGNOSIS — Z01.818 PREOP GENERAL PHYSICAL EXAM: Primary | ICD-10-CM

## 2019-01-17 LAB
ERYTHROCYTE [DISTWIDTH] IN BLOOD BY AUTOMATED COUNT: 13.3 % (ref 10–15)
HCT VFR BLD AUTO: 41 % (ref 35–47)
HGB BLD-MCNC: 13.6 G/DL (ref 11.7–15.7)
MCH RBC QN AUTO: 29.6 PG (ref 26.5–33)
MCHC RBC AUTO-ENTMCNC: 33.2 G/DL (ref 31.5–36.5)
MCV RBC AUTO: 89 FL (ref 78–100)
PLATELET # BLD AUTO: 324 10E9/L (ref 150–450)
RBC # BLD AUTO: 4.6 10E12/L (ref 3.8–5.2)
WBC # BLD AUTO: 9.5 10E9/L (ref 4–11)

## 2019-01-17 PROCEDURE — 36415 COLL VENOUS BLD VENIPUNCTURE: CPT | Performed by: INTERNAL MEDICINE

## 2019-01-17 PROCEDURE — 99215 OFFICE O/P EST HI 40 MIN: CPT | Performed by: INTERNAL MEDICINE

## 2019-01-17 PROCEDURE — 85027 COMPLETE CBC AUTOMATED: CPT | Performed by: INTERNAL MEDICINE

## 2019-01-17 RX ORDER — AMLODIPINE BESYLATE 5 MG/1
5 TABLET ORAL DAILY
Qty: 30 TABLET | Refills: 11 | Status: SHIPPED | OUTPATIENT
Start: 2019-01-17 | End: 2019-12-05

## 2019-01-17 ASSESSMENT — MIFFLIN-ST. JEOR: SCORE: 1416.78

## 2019-01-17 NOTE — H&P (VIEW-ONLY)
[address] [address]  [phone]  Dept: [phone]    PRE-OP EVALUATION:  Today's date: 2019    Jenna Card (: 1955) presents for pre-operative evaluation assessment as requested by Dr. Vega.  She requires evaluation and anesthesia risk assessment prior to undergoing surgery/procedure for treatment of Prolapse Pelvic, incontinence urine .    [facility]   Primary Physician: Wilmar Boyer  Type of Anesthesia Anticipated: General    Patient has a Health Care Directive or Living Will:  NO    Preop Questions 2019   Who is doing your surgery? curran and sitnikova   What are you having done? ROBOTIC ASSISTED XI SUPRACERVICAL HYSTERECTOMY BILATERAL SALPINGO-OOPHORECTOMY (ASHUTOSH) ROBOTIC ASSISTED XI SACROCOLPOPEXY, MID URETHERAL   Date of Surgery/Procedure: 2019   Facility or Hospital where procedure/surgery will be performed: [facility]   1.  Do you have a history of Heart attack, stroke, stent, coronary bypass surgery, or other heart surgery? No   2.  Do you ever have any pain or discomfort in your chest? No   3.  Do you have a history of  Heart Failure? No   4.   Are you troubled by shortness of breath when:  walking on a level surface, or up a slight hill, or at night? No   5.  Do you currently have a cold, bronchitis or other respiratory infection? No   6.  Do you have a cough, shortness of breath, or wheezing? No   7.  Do you sometimes get pains in the calves of your legs when you walk? No   8. Do you or anyone in your family have previous history of blood clots?  Mother had PE   9.  Do you or does anyone in your family have a serious bleeding problem such as prolonged bleeding following surgeries or cuts? No   10. Have you ever had problems with anemia or been told to take iron pills? No   11. Have you had any abnormal blood loss such as black, tarry or bloody stools, or  "abnormal vaginal bleeding? No   12. Have you ever had a blood transfusion? No   13. Have you or any of your relatives ever had problems with anesthesia? No   14. Do you have sleep apnea, excessive snoring or daytime drowsiness? YES -  Has BIJU. Has not started CPAP yet but has maynor prescribed   15. Do you have any prosthetic heart valves? No   16. Do you have prosthetic joints? No   17. Is there any chance that you may be pregnant? No         HPI:     HPI related to upcoming procedure:   Has urinary incontinence and pelvic prolapse. Following consultation with above surgeons, pt has elected to undergo surgical treatment as above. See below for other medical issues.  See below for other medical issues.  History of obstructive sleep apnea and has been prescribed a CPAP machine but has not started using it yet.  Has it at home.  History of diabetes.  Recent A1c 7.0.  Has improved diet since that lab result with recent blood sugars as below.  On blood pressure therapy.  Patient states systolic blood pressures at home chronically in the upper 140s  History of coronary CT calcium study earlier in 2018.    The scan showed calcium in patient's LAD, circumflex and RCA.  Overall, her calcium score was 830, which placed her at the 98th percentile.  Patient was therefore referred to cardiology and saw Dr. Lozano 8/29/18. Part of his note as below:    \" I reviewed the patient's CT calcium score from [facility].  The scan shows that there is atherosclerotic plaque in the patient's coronary arteries.  All of her coronary arteries do have calcium suggesting diffuse atherosclerotic coronary artery disease. The patient currently does not endorse any significant symptoms; therefore, I doubt that she has significant obstructive coronary artery disease.  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 Able to walk up 5 flights of stairs without issues     Sugars recently:  119,91  124,148  116,104  100         MEDICAL HISTORY:     Patient Active Problem List    Diagnosis Date Noted     BIJU on CPAP      Priority: Medium     CAD (coronary artery disease)      Priority: Medium     BIJU (obstructive sleep apnea) 10/04/2018     Priority: Medium     Chronic gout without tophus, unspecified cause, unspecified site 07/04/2018     Priority: Medium     Type 2 diabetes mellitus without complication, without long-term current use of insulin (H) 10/30/2016     Priority: Medium     Hypothyroidism, unspecified type 06/13/2016     Priority: Medium     Essential hypertension 12/16/2015     Priority: Medium     Celiac disease      Priority: Medium     Obesity 09/29/2014     Priority: Medium     Hepatitis      Priority: Medium     Hyperlipidemia LDL goal <100      Priority: Medium      Past Medical History:   Diagnosis Date     CAD (coronary artery disease)      Per CT calcium score study done 2018     Celiac disease      Cough     most winters.  Cough lasting 6-8 weeks     Gout      Hepatitis     trhought related to celiac disease along with possible steatohepatitis     HTN (hypertension)      Hyperlipidemia LDL goal <100      Hypothyroidism 9/29/2014     Obesity 9/29/2014     BIJU on CPAP      Polyp of colon 2009    3mm. Hyperplastic     Type 2 diabetes mellitus without complication  (goal A1C<7) 10/20/2015     Past Surgical History:   Procedure Laterality Date     BIOPSY  1985    Dermatitis Herpetiformis diagnosis     C NONSPECIFIC PROCEDURE Left     arthroscopic knee surgery     C NONSPECIFIC PROCEDURE  1996     D&C for dysfunctional uterine bleeding - findings benign     COLONOSCOPY  2008    1 polyp removed     ENT SURGERY  1958    tonsils removed     ORTHOPEDIC SURGERY  2008    arthroscopic left knee     Current Outpatient Medications   Medication Sig Dispense Refill      albuterol (PROAIR HFA/PROVENTIL HFA/VENTOLIN HFA) 108 (90 Base) MCG/ACT inhaler Inhale 2 puffs into the lungs every 6 hours as needed for shortness of breath / dyspnea or wheezing 1 Inhaler 11     allopurinol (ZYLOPRIM) 300 MG tablet TAKE ONE TABLET BY MOUTH ONE TIME DAILY  30 tablet 11     aspirin 81 MG EC tablet Take 81 mg by mouth At Bedtime       atenolol (TENORMIN) 50 MG tablet TAKE TWO TABLETS BY MOUTH DAILY  60 tablet 11     atorvastatin (LIPITOR) 20 MG tablet Take 1 tablet (20 mg) by mouth daily 90 tablet 3     azelastine (ASTELIN) 0.1 % nasal spray Spray 2 sprays into both nostrils 2 times daily 1 Bottle 11     azithromycin (ZITHROMAX) 250 MG tablet Two tablets first day, then one tablet daily for four days. 6 tablet 0     blood glucose monitoring (ACCU-CHEK MARSHALL PLUS) meter device kit   99     blood glucose monitoring (ACCU-CHEK MARSHALL PLUS) test strip Check blood sugars once daily 1 Box 1     blood glucose monitoring (SOFTCLIX) lancets Use to test blood sugar 1 times daily or as directed. 100 each 0     hydrochlorothiazide (HYDRODIURIL) 25 MG tablet TAKE ONE TABLET BY MOUTH IN THE MORNING  90 tablet 1     levothyroxine (SYNTHROID/LEVOTHROID) 75 MCG tablet Take 1 tablet (75 mcg) by mouth daily 90 tablet 3     losartan (COZAAR) 100 MG tablet TAKE HALF TABLET BY MOUTH TWICE DAILY  90 tablet 1     metFORMIN (GLUCOPHAGE) 500 MG tablet TAKE ONE TABLET BY MOUTH TWICE DAILY WITH MEALS. 180 tablet 3     Omega-3 Fatty Acids (OMEGA-3 FISH OIL) 1000 MG CAPS Take 1 capsule (1 g) by mouth daily       triamcinolone (NASACORT AQ) 55 MCG/ACT nasal inhaler Spray 2 sprays into both nostrils daily (Patient taking differently: Spray 2 sprays into both nostrils as needed ) 1 Bottle 0     OTC products: None, except as noted above    Allergies   Allergen Reactions     Dapsone Hives     Sulfa Drugs Hives     Allergy to Dapsone- wants to stay away from all sulfa drugs      Latex Allergy: NO    Social History     Tobacco Use      "Smoking status: Never Smoker     Smokeless tobacco: Never Used   Substance Use Topics     Alcohol use: Yes     Comment: 1 drink weekly     History   Drug Use No       REVIEW OF SYSTEMS:   CONSTITUTIONAL: NEGATIVE for fever, chills. Weight down 8 pounds in 1 year  INTEGUMENTARY/SKIN: NEGATIVE for worrisome rashes, moles or lesions  EYES: NEGATIVE for vision changes or irritation. Has glasses  ENT/MOUTH: NEGATIVE for ear, mouth and throat problems  RESP: NEGATIVE for significant cough or SOB  BREAST: NEGATIVE for masses, tenderness or discharge  CV: NEGATIVE for chest pain, palpitations or peripheral edema  GI: NEGATIVE for nausea, abdominal pain, heartburn, or change in bowel habits. Minimal AST elevated with recent labs  : NEGATIVE for  dysuria, or hematuria. See above re: incontinence urine and pelvic prolapse. No vaginal bleeding  MUSCULOSKELETAL: NEGATIVE for significant arthralgias or myalgia that limit activity  NEURO: NEGATIVE for weakness, dizziness or paresthesias  ENDOCRINE: NEGATIVE for temperature intolerance.  On meds for DM, lipids and thyroid  HEME: NEGATIVE for bleeding problems  PSYCHIATRIC: NEGATIVE for changes in mood or affect    EXAM:   /78   Pulse 59   Temp 98.5  F (36.9  C) (Oral)   Resp 16   Ht 1.702 m (5' 7\")   Wt 83.9 kg (185 lb)   SpO2 98%   BMI 28.98 kg/m       HENT: ear canals and TM's normal and nose and mouth without ulcers or lesions .  Mildly narrowed posterior pharyngeal airway   Neck: no adenopathy. Thyroid normal to palpation. No bruits  Pulm: Lungs clear to auscultation   CV: Regular rates and rhythm  GI: Soft, nontender, Normal active bowel sounds, No hepatosplenomegaly or masses palpable  Ext: Peripheral pulses intact. No edema.  Neuro: Normal strength and tone, sensory exam grossly normal      DIAGNOSTICS:   EK18 showed sinus waldo with rate 56. No acute ST/T changes.      ECHO 18:  Interpretation Summary     Left ventricular systolic function is " normal.  The visual ejection fraction is estimated at 55-60%.  The right ventricle is normal in structure, function and size.  No hemodynamically significant valvular abnormalities.  The rhythm was sinus bradycardia.  Normal transthoracic echocardiogram. There is no comparison study available.    Component      Latest Ref Rng & Units 8/30/2018 1/15/2019 1/17/2019   Sodium      133 - 144 mmol/L  138    Potassium      3.4 - 5.3 mmol/L  4.8    Chloride      94 - 109 mmol/L  105    Carbon Dioxide      20 - 32 mmol/L  26    Anion Gap      3 - 14 mmol/L  7    Glucose      70 - 99 mg/dL  125 (H)    Urea Nitrogen      7 - 30 mg/dL  20    Creatinine      0.52 - 1.04 mg/dL  0.78    GFR Estimate      >60 mL/min/1.73:m2  80    GFR Estimate If Black      >60 mL/min/1.73:m2  >90    Calcium      8.5 - 10.1 mg/dL  9.4    Bilirubin Total      0.2 - 1.3 mg/dL  0.6    Albumin      3.4 - 5.0 g/dL  3.8    Protein Total      6.8 - 8.8 g/dL  7.1    Alkaline Phosphatase      40 - 150 U/L  63    ALT      0 - 50 U/L  43    AST      0 - 45 U/L  47 (H)    WBC      4.0 - 11.0 10e9/L   9.5   RBC Count      3.8 - 5.2 10e12/L   4.60   Hemoglobin      11.7 - 15.7 g/dL   13.6   Hematocrit      35.0 - 47.0 %   41.0   MCV      78 - 100 fl   89   MCH      26.5 - 33.0 pg   29.6   MCHC      31.5 - 36.5 g/dL   33.2   RDW      10.0 - 15.0 %   13.3   Platelet Count      150 - 450 10e9/L   324   Hemoglobin A1C      0 - 5.6 % 6.5 (H) 7.0 (H)    TSH      0.40 - 4.00 mU/L  0.88        IMPRESSION:   Reason for surgery/procedure:  Pelvic prolapse, urinary incontinence  Diagnosis/reason for consult:   1.  Diabetes mellitus -mild worsening of control recently with dietary indiscretion but overall still controlled with A1c 7.0  2. CAD -asymptomatic.  Cardiology has recommended medical management.  No chest pain with exertional activity  3.  Hypothyroidism -controlled with medication  4. BIJU -has CPAP but has not been using yet.  Patient will start therapy and bring  to the hospital  5.  Hypertension -systolic blood pressure mildly above goal consistently her home blood pressure checks.  Will add amlodipine 5 mg daily  6.  Hyperlipidemia - controlled with statin therapy    The proposed surgical procedure is considered INTERMEDIATE risk.    REVISED CARDIAC RISK INDEX  The patient has the following serious cardiovascular risks for perioperative complications such as (MI, PE, VFib and 3  AV Block):  Coronary Artery Disease (MI, positive stress test, angina, Qs on EKG)  INTERPRETATION: 1 risks: Class II (low risk - 0.9% complication rate)    The patient has the following additional risks for perioperative complications:  No identified additional risks         RECOMMENDATIONS:       Cardiovascular Risk  Performs 4 METs exercise without symptoms (Climb a flight of stairs and Walk on level ground at 15 minutes per mile (4 miles/hour)) .       Obstructive Sleep Apnea (or suspected sleep apnea)  Patient is to bring their home CPAP with them on the day of surgery      APPROVAL GIVEN to proceed with proposed procedure, without further diagnostic evaluation       PLAN:   Start Amlodipine 5mg tab, 1 tab daily for blood pressure    Nothing to eat/drink after midnight  prior to surgery except take Amlodipine, Atenolol and levothyroxine that AM with a small sip water   Start use of CPAP and bring the machine to the hospital  when have surgery  Reduce carbohydrate (sugars, starchy foods such as bread, rice, potato, pasta, etc) intake  in your diet and increase the amount of color on your plate with fruits, vegetables and lean meats.   Nonfasting A1C lab in 3 months  Get BP recheck in  pharmacy in early February and ask them to send me the result  Aspirin and fish oil on hold until after surgery  DVT prophylaxis after surgery per surgeon/hospitalist orders    Signed Electronically by: Wilmar Boyer MD    Copy of this evaluation report is provided to requesting physician.    [facility] Preop  Guidelines    Revised Cardiac Risk Index

## 2019-01-17 NOTE — PATIENT INSTRUCTIONS
Start Amlodipine 5mg tab, 1 tab daily for blood pressure    Nothing to eat/drink after midnight  Prior to surgery except take Amlodipine, Atenolol and levothyroxine that AM with a small sip water   Start use of CPAP and bring the machine to the hospital  when have surgery  Nonfasting A1C lab in 3 months  Labs today for surgery  Get BP recheck in  pharmacy in early February and ask them to send me the result  Aspirin and fish oil on hold until after surgery

## 2019-01-17 NOTE — PROGRESS NOTES
[address] [address]  [phone]  Dept: [phone]    PRE-OP EVALUATION:  Today's date: 2019    Jenna Card (: 1955) presents for pre-operative evaluation assessment as requested by Dr. Vega.  She requires evaluation and anesthesia risk assessment prior to undergoing surgery/procedure for treatment of Prolapse Pelvic, incontinence urine .    [facility]   Primary Physician: Wilmar Boyer  Type of Anesthesia Anticipated: General    Patient has a Health Care Directive or Living Will:  NO    Preop Questions 2019   Who is doing your surgery? curran and sitnikova   What are you having done? ROBOTIC ASSISTED XI SUPRACERVICAL HYSTERECTOMY BILATERAL SALPINGO-OOPHORECTOMY (ASHUTOSH) ROBOTIC ASSISTED XI SACROCOLPOPEXY, MID URETHERAL   Date of Surgery/Procedure: 2019   Facility or Hospital where procedure/surgery will be performed: [facility]   1.  Do you have a history of Heart attack, stroke, stent, coronary bypass surgery, or other heart surgery? No   2.  Do you ever have any pain or discomfort in your chest? No   3.  Do you have a history of  Heart Failure? No   4.   Are you troubled by shortness of breath when:  walking on a level surface, or up a slight hill, or at night? No   5.  Do you currently have a cold, bronchitis or other respiratory infection? No   6.  Do you have a cough, shortness of breath, or wheezing? No   7.  Do you sometimes get pains in the calves of your legs when you walk? No   8. Do you or anyone in your family have previous history of blood clots?  Mother had PE   9.  Do you or does anyone in your family have a serious bleeding problem such as prolonged bleeding following surgeries or cuts? No   10. Have you ever had problems with anemia or been told to take iron pills? No   11. Have you had any abnormal blood loss such as black, tarry or bloody stools, or  "abnormal vaginal bleeding? No   12. Have you ever had a blood transfusion? No   13. Have you or any of your relatives ever had problems with anesthesia? No   14. Do you have sleep apnea, excessive snoring or daytime drowsiness? YES -  Has BIJU. Has not started CPAP yet but has maynor prescribed   15. Do you have any prosthetic heart valves? No   16. Do you have prosthetic joints? No   17. Is there any chance that you may be pregnant? No         HPI:     HPI related to upcoming procedure:   Has urinary incontinence and pelvic prolapse. Following consultation with above surgeons, pt has elected to undergo surgical treatment as above. See below for other medical issues.  See below for other medical issues.  History of obstructive sleep apnea and has been prescribed a CPAP machine but has not started using it yet.  Has it at home.  History of diabetes.  Recent A1c 7.0.  Has improved diet since that lab result with recent blood sugars as below.  On blood pressure therapy.  Patient states systolic blood pressures at home chronically in the upper 140s  History of coronary CT calcium study earlier in 2018.    The scan showed calcium in patient's LAD, circumflex and RCA.  Overall, her calcium score was 830, which placed her at the 98th percentile.  Patient was therefore referred to cardiology and saw Dr. Lozano 8/29/18. Part of his note as below:    \" I reviewed the patient's CT calcium score from [facility].  The scan shows that there is atherosclerotic plaque in the patient's coronary arteries.  All of her coronary arteries do have calcium suggesting diffuse atherosclerotic coronary artery disease. The patient currently does not endorse any significant symptoms; therefore, I doubt that she has significant obstructive coronary artery disease.  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 Able to walk up 5 flights of stairs without issues     Sugars recently:  119,91  124,148  116,104  100         MEDICAL HISTORY:     Patient Active Problem List    Diagnosis Date Noted     BIJU on CPAP      Priority: Medium     CAD (coronary artery disease)      Priority: Medium     BIJU (obstructive sleep apnea) 10/04/2018     Priority: Medium     Chronic gout without tophus, unspecified cause, unspecified site 07/04/2018     Priority: Medium     Type 2 diabetes mellitus without complication, without long-term current use of insulin (H) 10/30/2016     Priority: Medium     Hypothyroidism, unspecified type 06/13/2016     Priority: Medium     Essential hypertension 12/16/2015     Priority: Medium     Celiac disease      Priority: Medium     Obesity 09/29/2014     Priority: Medium     Hepatitis      Priority: Medium     Hyperlipidemia LDL goal <100      Priority: Medium      Past Medical History:   Diagnosis Date     CAD (coronary artery disease)      Per CT calcium score study done 2018     Celiac disease      Cough     most winters.  Cough lasting 6-8 weeks     Gout      Hepatitis     trhought related to celiac disease along with possible steatohepatitis     HTN (hypertension)      Hyperlipidemia LDL goal <100      Hypothyroidism 9/29/2014     Obesity 9/29/2014     BIJU on CPAP      Polyp of colon 2009    3mm. Hyperplastic     Type 2 diabetes mellitus without complication  (goal A1C<7) 10/20/2015     Past Surgical History:   Procedure Laterality Date     BIOPSY  1985    Dermatitis Herpetiformis diagnosis     C NONSPECIFIC PROCEDURE Left     arthroscopic knee surgery     C NONSPECIFIC PROCEDURE  1996     D&C for dysfunctional uterine bleeding - findings benign     COLONOSCOPY  2008    1 polyp removed     ENT SURGERY  1958    tonsils removed     ORTHOPEDIC SURGERY  2008    arthroscopic left knee     Current Outpatient Medications   Medication Sig Dispense Refill      albuterol (PROAIR HFA/PROVENTIL HFA/VENTOLIN HFA) 108 (90 Base) MCG/ACT inhaler Inhale 2 puffs into the lungs every 6 hours as needed for shortness of breath / dyspnea or wheezing 1 Inhaler 11     allopurinol (ZYLOPRIM) 300 MG tablet TAKE ONE TABLET BY MOUTH ONE TIME DAILY  30 tablet 11     aspirin 81 MG EC tablet Take 81 mg by mouth At Bedtime       atenolol (TENORMIN) 50 MG tablet TAKE TWO TABLETS BY MOUTH DAILY  60 tablet 11     atorvastatin (LIPITOR) 20 MG tablet Take 1 tablet (20 mg) by mouth daily 90 tablet 3     azelastine (ASTELIN) 0.1 % nasal spray Spray 2 sprays into both nostrils 2 times daily 1 Bottle 11     azithromycin (ZITHROMAX) 250 MG tablet Two tablets first day, then one tablet daily for four days. 6 tablet 0     blood glucose monitoring (ACCU-CHEK MARSHALL PLUS) meter device kit   99     blood glucose monitoring (ACCU-CHEK MARSHALL PLUS) test strip Check blood sugars once daily 1 Box 1     blood glucose monitoring (SOFTCLIX) lancets Use to test blood sugar 1 times daily or as directed. 100 each 0     hydrochlorothiazide (HYDRODIURIL) 25 MG tablet TAKE ONE TABLET BY MOUTH IN THE MORNING  90 tablet 1     levothyroxine (SYNTHROID/LEVOTHROID) 75 MCG tablet Take 1 tablet (75 mcg) by mouth daily 90 tablet 3     losartan (COZAAR) 100 MG tablet TAKE HALF TABLET BY MOUTH TWICE DAILY  90 tablet 1     metFORMIN (GLUCOPHAGE) 500 MG tablet TAKE ONE TABLET BY MOUTH TWICE DAILY WITH MEALS. 180 tablet 3     Omega-3 Fatty Acids (OMEGA-3 FISH OIL) 1000 MG CAPS Take 1 capsule (1 g) by mouth daily       triamcinolone (NASACORT AQ) 55 MCG/ACT nasal inhaler Spray 2 sprays into both nostrils daily (Patient taking differently: Spray 2 sprays into both nostrils as needed ) 1 Bottle 0     OTC products: None, except as noted above    Allergies   Allergen Reactions     Dapsone Hives     Sulfa Drugs Hives     Allergy to Dapsone- wants to stay away from all sulfa drugs      Latex Allergy: NO    Social History     Tobacco Use      "Smoking status: Never Smoker     Smokeless tobacco: Never Used   Substance Use Topics     Alcohol use: Yes     Comment: 1 drink weekly     History   Drug Use No       REVIEW OF SYSTEMS:   CONSTITUTIONAL: NEGATIVE for fever, chills. Weight down 8 pounds in 1 year  INTEGUMENTARY/SKIN: NEGATIVE for worrisome rashes, moles or lesions  EYES: NEGATIVE for vision changes or irritation. Has glasses  ENT/MOUTH: NEGATIVE for ear, mouth and throat problems  RESP: NEGATIVE for significant cough or SOB  BREAST: NEGATIVE for masses, tenderness or discharge  CV: NEGATIVE for chest pain, palpitations or peripheral edema  GI: NEGATIVE for nausea, abdominal pain, heartburn, or change in bowel habits. Minimal AST elevated with recent labs  : NEGATIVE for  dysuria, or hematuria. See above re: incontinence urine and pelvic prolapse. No vaginal bleeding  MUSCULOSKELETAL: NEGATIVE for significant arthralgias or myalgia that limit activity  NEURO: NEGATIVE for weakness, dizziness or paresthesias  ENDOCRINE: NEGATIVE for temperature intolerance.  On meds for DM, lipids and thyroid  HEME: NEGATIVE for bleeding problems  PSYCHIATRIC: NEGATIVE for changes in mood or affect    EXAM:   /78   Pulse 59   Temp 98.5  F (36.9  C) (Oral)   Resp 16   Ht 1.702 m (5' 7\")   Wt 83.9 kg (185 lb)   SpO2 98%   BMI 28.98 kg/m       HENT: ear canals and TM's normal and nose and mouth without ulcers or lesions .  Mildly narrowed posterior pharyngeal airway   Neck: no adenopathy. Thyroid normal to palpation. No bruits  Pulm: Lungs clear to auscultation   CV: Regular rates and rhythm  GI: Soft, nontender, Normal active bowel sounds, No hepatosplenomegaly or masses palpable  Ext: Peripheral pulses intact. No edema.  Neuro: Normal strength and tone, sensory exam grossly normal      DIAGNOSTICS:   EK18 showed sinus waldo with rate 56. No acute ST/T changes.      ECHO 18:  Interpretation Summary     Left ventricular systolic function is " normal.  The visual ejection fraction is estimated at 55-60%.  The right ventricle is normal in structure, function and size.  No hemodynamically significant valvular abnormalities.  The rhythm was sinus bradycardia.  Normal transthoracic echocardiogram. There is no comparison study available.    Component      Latest Ref Rng & Units 8/30/2018 1/15/2019 1/17/2019   Sodium      133 - 144 mmol/L  138    Potassium      3.4 - 5.3 mmol/L  4.8    Chloride      94 - 109 mmol/L  105    Carbon Dioxide      20 - 32 mmol/L  26    Anion Gap      3 - 14 mmol/L  7    Glucose      70 - 99 mg/dL  125 (H)    Urea Nitrogen      7 - 30 mg/dL  20    Creatinine      0.52 - 1.04 mg/dL  0.78    GFR Estimate      >60 mL/min/1.73:m2  80    GFR Estimate If Black      >60 mL/min/1.73:m2  >90    Calcium      8.5 - 10.1 mg/dL  9.4    Bilirubin Total      0.2 - 1.3 mg/dL  0.6    Albumin      3.4 - 5.0 g/dL  3.8    Protein Total      6.8 - 8.8 g/dL  7.1    Alkaline Phosphatase      40 - 150 U/L  63    ALT      0 - 50 U/L  43    AST      0 - 45 U/L  47 (H)    WBC      4.0 - 11.0 10e9/L   9.5   RBC Count      3.8 - 5.2 10e12/L   4.60   Hemoglobin      11.7 - 15.7 g/dL   13.6   Hematocrit      35.0 - 47.0 %   41.0   MCV      78 - 100 fl   89   MCH      26.5 - 33.0 pg   29.6   MCHC      31.5 - 36.5 g/dL   33.2   RDW      10.0 - 15.0 %   13.3   Platelet Count      150 - 450 10e9/L   324   Hemoglobin A1C      0 - 5.6 % 6.5 (H) 7.0 (H)    TSH      0.40 - 4.00 mU/L  0.88        IMPRESSION:   Reason for surgery/procedure:  Pelvic prolapse, urinary incontinence  Diagnosis/reason for consult:   1.  Diabetes mellitus -mild worsening of control recently with dietary indiscretion but overall still controlled with A1c 7.0  2. CAD -asymptomatic.  Cardiology has recommended medical management.  No chest pain with exertional activity  3.  Hypothyroidism -controlled with medication  4. BIJU -has CPAP but has not been using yet.  Patient will start therapy and bring  to the hospital  5.  Hypertension -systolic blood pressure mildly above goal consistently her home blood pressure checks.  Will add amlodipine 5 mg daily  6.  Hyperlipidemia - controlled with statin therapy    The proposed surgical procedure is considered INTERMEDIATE risk.    REVISED CARDIAC RISK INDEX  The patient has the following serious cardiovascular risks for perioperative complications such as (MI, PE, VFib and 3  AV Block):  Coronary Artery Disease (MI, positive stress test, angina, Qs on EKG)  INTERPRETATION: 1 risks: Class II (low risk - 0.9% complication rate)    The patient has the following additional risks for perioperative complications:  No identified additional risks         RECOMMENDATIONS:       Cardiovascular Risk  Performs 4 METs exercise without symptoms (Climb a flight of stairs and Walk on level ground at 15 minutes per mile (4 miles/hour)) .       Obstructive Sleep Apnea (or suspected sleep apnea)  Patient is to bring their home CPAP with them on the day of surgery      APPROVAL GIVEN to proceed with proposed procedure, without further diagnostic evaluation       PLAN:   Start Amlodipine 5mg tab, 1 tab daily for blood pressure    Nothing to eat/drink after midnight  prior to surgery except take Amlodipine, Atenolol and levothyroxine that AM with a small sip water   Start use of CPAP and bring the machine to the hospital  when have surgery  Reduce carbohydrate (sugars, starchy foods such as bread, rice, potato, pasta, etc) intake  in your diet and increase the amount of color on your plate with fruits, vegetables and lean meats.   Nonfasting A1C lab in 3 months  Get BP recheck in  pharmacy in early February and ask them to send me the result  Aspirin and fish oil on hold until after surgery  DVT prophylaxis after surgery per surgeon/hospitalist orders    Signed Electronically by: Wilmar Boyer MD    Copy of this evaluation report is provided to requesting physician.    Natalya Preop  Guidelines    Revised Cardiac Risk Index

## 2019-01-23 ENCOUNTER — ANESTHESIA EVENT (OUTPATIENT)
Dept: SURGERY | Facility: CLINIC | Age: 64
End: 2019-01-23
Payer: COMMERCIAL

## 2019-01-23 ENCOUNTER — ANESTHESIA (OUTPATIENT)
Dept: SURGERY | Facility: CLINIC | Age: 64
End: 2019-01-23
Payer: COMMERCIAL

## 2019-01-23 ENCOUNTER — HOSPITAL ENCOUNTER (OUTPATIENT)
Facility: CLINIC | Age: 64
Discharge: HOME | End: 2019-01-24
Attending: OBSTETRICS & GYNECOLOGY | Admitting: OBSTETRICS & GYNECOLOGY
Payer: COMMERCIAL

## 2019-01-23 DIAGNOSIS — N81.4 CYSTOCELE WITH PROLAPSE: Primary | ICD-10-CM

## 2019-01-23 DIAGNOSIS — I10 ESSENTIAL HYPERTENSION: ICD-10-CM

## 2019-01-23 DIAGNOSIS — E11.9 TYPE 2 DIABETES MELLITUS WITHOUT COMPLICATION, WITHOUT LONG-TERM CURRENT USE OF INSULIN (H): ICD-10-CM

## 2019-01-23 LAB
ABO + RH BLD: NORMAL
ABO + RH BLD: NORMAL
BLD GP AB SCN SERPL QL: NORMAL
BLOOD BANK CMNT PATIENT-IMP: NORMAL
CREAT SERPL-MCNC: 0.76 MG/DL (ref 0.52–1.04)
GFR SERPL CREATININE-BSD FRML MDRD: 82 ML/MIN/{1.73_M2}
GLUCOSE BLDC GLUCOMTR-MCNC: 139 MG/DL (ref 70–99)
GLUCOSE BLDC GLUCOMTR-MCNC: 170 MG/DL (ref 70–99)
GLUCOSE BLDC GLUCOMTR-MCNC: 213 MG/DL (ref 70–99)
POTASSIUM SERPL-SCNC: 3.8 MMOL/L (ref 3.4–5.3)
SPECIMEN EXP DATE BLD: NORMAL

## 2019-01-23 PROCEDURE — 36000087 ZZH SURGERY LEVEL 8 EA 15 ADDTL MIN: Performed by: OBSTETRICS & GYNECOLOGY

## 2019-01-23 PROCEDURE — C1781 MESH (IMPLANTABLE): HCPCS | Performed by: OBSTETRICS & GYNECOLOGY

## 2019-01-23 PROCEDURE — 25000132 ZZH RX MED GY IP 250 OP 250 PS 637: Performed by: PHYSICIAN ASSISTANT

## 2019-01-23 PROCEDURE — 99207 ZZC CDG-CODE CATEGORY CHANGED: CPT | Performed by: PHYSICIAN ASSISTANT

## 2019-01-23 PROCEDURE — 40000170 ZZH STATISTIC PRE-PROCEDURE ASSESSMENT II: Performed by: OBSTETRICS & GYNECOLOGY

## 2019-01-23 PROCEDURE — 25000128 H RX IP 250 OP 636: Performed by: OBSTETRICS & GYNECOLOGY

## 2019-01-23 PROCEDURE — 25000128 H RX IP 250 OP 636: Performed by: ANESTHESIOLOGY

## 2019-01-23 PROCEDURE — 36000085 ZZH SURGERY LEVEL 8 1ST 30 MIN: Performed by: OBSTETRICS & GYNECOLOGY

## 2019-01-23 PROCEDURE — 99219 ZZC INITIAL OBSERVATION CARE,LEVL II: CPT | Performed by: PHYSICIAN ASSISTANT

## 2019-01-23 PROCEDURE — 86901 BLOOD TYPING SEROLOGIC RH(D): CPT | Performed by: OBSTETRICS & GYNECOLOGY

## 2019-01-23 PROCEDURE — 88305 TISSUE EXAM BY PATHOLOGIST: CPT | Mod: 26 | Performed by: OBSTETRICS & GYNECOLOGY

## 2019-01-23 PROCEDURE — 27210995 ZZH RX 272: Performed by: OBSTETRICS & GYNECOLOGY

## 2019-01-23 PROCEDURE — 25800025 ZZH RX 258: Performed by: OBSTETRICS & GYNECOLOGY

## 2019-01-23 PROCEDURE — 86900 BLOOD TYPING SEROLOGIC ABO: CPT | Performed by: OBSTETRICS & GYNECOLOGY

## 2019-01-23 PROCEDURE — 25000128 H RX IP 250 OP 636: Performed by: NURSE ANESTHETIST, CERTIFIED REGISTERED

## 2019-01-23 PROCEDURE — 25000125 ZZHC RX 250: Performed by: NURSE ANESTHETIST, CERTIFIED REGISTERED

## 2019-01-23 PROCEDURE — C1771 REP DEV, URINARY, W/SLING: HCPCS | Performed by: OBSTETRICS & GYNECOLOGY

## 2019-01-23 PROCEDURE — 25000566 ZZH SEVOFLURANE, EA 15 MIN: Performed by: OBSTETRICS & GYNECOLOGY

## 2019-01-23 PROCEDURE — 71000012 ZZH RECOVERY PHASE 1 LEVEL 1 FIRST HR: Performed by: OBSTETRICS & GYNECOLOGY

## 2019-01-23 PROCEDURE — 86850 RBC ANTIBODY SCREEN: CPT | Performed by: OBSTETRICS & GYNECOLOGY

## 2019-01-23 PROCEDURE — 88305 TISSUE EXAM BY PATHOLOGIST: CPT | Performed by: OBSTETRICS & GYNECOLOGY

## 2019-01-23 PROCEDURE — 71000013 ZZH RECOVERY PHASE 1 LEVEL 1 EA ADDTL HR: Performed by: OBSTETRICS & GYNECOLOGY

## 2019-01-23 PROCEDURE — 82565 ASSAY OF CREATININE: CPT | Performed by: ANESTHESIOLOGY

## 2019-01-23 PROCEDURE — 25000125 ZZHC RX 250: Performed by: OBSTETRICS & GYNECOLOGY

## 2019-01-23 PROCEDURE — 25800025 ZZH RX 258: Performed by: UROLOGY

## 2019-01-23 PROCEDURE — 37000008 ZZH ANESTHESIA TECHNICAL FEE, 1ST 30 MIN: Performed by: OBSTETRICS & GYNECOLOGY

## 2019-01-23 PROCEDURE — 37000009 ZZH ANESTHESIA TECHNICAL FEE, EACH ADDTL 15 MIN: Performed by: OBSTETRICS & GYNECOLOGY

## 2019-01-23 PROCEDURE — 36415 COLL VENOUS BLD VENIPUNCTURE: CPT | Performed by: OBSTETRICS & GYNECOLOGY

## 2019-01-23 PROCEDURE — 25000132 ZZH RX MED GY IP 250 OP 250 PS 637: Performed by: OBSTETRICS & GYNECOLOGY

## 2019-01-23 PROCEDURE — 25000132 ZZH RX MED GY IP 250 OP 250 PS 637: Performed by: ANESTHESIOLOGY

## 2019-01-23 PROCEDURE — 25000131 ZZH RX MED GY IP 250 OP 636 PS 637: Performed by: PHYSICIAN ASSISTANT

## 2019-01-23 PROCEDURE — 25000128 H RX IP 250 OP 636: Performed by: PHYSICIAN ASSISTANT

## 2019-01-23 PROCEDURE — 84132 ASSAY OF SERUM POTASSIUM: CPT | Performed by: ANESTHESIOLOGY

## 2019-01-23 PROCEDURE — 82962 GLUCOSE BLOOD TEST: CPT

## 2019-01-23 PROCEDURE — 27210794 ZZH OR GENERAL SUPPLY STERILE: Performed by: OBSTETRICS & GYNECOLOGY

## 2019-01-23 DEVICE — MESH SLING Y SHAPE RESTORELLE 24X4CM 501420: Type: IMPLANTABLE DEVICE | Site: PELVIS | Status: FUNCTIONAL

## 2019-01-23 DEVICE — MESH SLING OBTRYX-HALO M0068505000: Type: IMPLANTABLE DEVICE | Site: PELVIS | Status: FUNCTIONAL

## 2019-01-23 RX ORDER — PROPOFOL 10 MG/ML
INJECTION, EMULSION INTRAVENOUS CONTINUOUS PRN
Status: DISCONTINUED | OUTPATIENT
Start: 2019-01-23 | End: 2019-01-23

## 2019-01-23 RX ORDER — NICOTINE POLACRILEX 4 MG
15-30 LOZENGE BUCCAL
Status: DISCONTINUED | OUTPATIENT
Start: 2019-01-23 | End: 2019-01-24 | Stop reason: HOSPADM

## 2019-01-23 RX ORDER — CIPROFLOXACIN 2 MG/ML
400 INJECTION, SOLUTION INTRAVENOUS EVERY 12 HOURS
Status: DISCONTINUED | OUTPATIENT
Start: 2019-01-23 | End: 2019-01-24 | Stop reason: HOSPADM

## 2019-01-23 RX ORDER — ONDANSETRON 4 MG/1
4 TABLET, ORALLY DISINTEGRATING ORAL EVERY 30 MIN PRN
Status: DISCONTINUED | OUTPATIENT
Start: 2019-01-23 | End: 2019-01-23 | Stop reason: HOSPADM

## 2019-01-23 RX ORDER — FENTANYL CITRATE 50 UG/ML
25-50 INJECTION, SOLUTION INTRAMUSCULAR; INTRAVENOUS
Status: DISCONTINUED | OUTPATIENT
Start: 2019-01-23 | End: 2019-01-23 | Stop reason: HOSPADM

## 2019-01-23 RX ORDER — DEXTROSE MONOHYDRATE 25 G/50ML
25-50 INJECTION, SOLUTION INTRAVENOUS
Status: DISCONTINUED | OUTPATIENT
Start: 2019-01-23 | End: 2019-01-24 | Stop reason: HOSPADM

## 2019-01-23 RX ORDER — LABETALOL HYDROCHLORIDE 5 MG/ML
10 INJECTION, SOLUTION INTRAVENOUS
Status: DISCONTINUED | OUTPATIENT
Start: 2019-01-23 | End: 2019-01-23 | Stop reason: HOSPADM

## 2019-01-23 RX ORDER — SODIUM CHLORIDE 9 MG/ML
INJECTION, SOLUTION INTRAVENOUS CONTINUOUS
Status: DISCONTINUED | OUTPATIENT
Start: 2019-01-23 | End: 2019-01-24 | Stop reason: HOSPADM

## 2019-01-23 RX ORDER — DEXAMETHASONE SODIUM PHOSPHATE 4 MG/ML
INJECTION, SOLUTION INTRA-ARTICULAR; INTRALESIONAL; INTRAMUSCULAR; INTRAVENOUS; SOFT TISSUE PRN
Status: DISCONTINUED | OUTPATIENT
Start: 2019-01-23 | End: 2019-01-23

## 2019-01-23 RX ORDER — ATORVASTATIN CALCIUM 10 MG/1
20 TABLET, FILM COATED ORAL AT BEDTIME
Status: DISCONTINUED | OUTPATIENT
Start: 2019-01-23 | End: 2019-01-24 | Stop reason: HOSPADM

## 2019-01-23 RX ORDER — MEPERIDINE HYDROCHLORIDE 25 MG/ML
12.5 INJECTION INTRAMUSCULAR; INTRAVENOUS; SUBCUTANEOUS
Status: DISCONTINUED | OUTPATIENT
Start: 2019-01-23 | End: 2019-01-23 | Stop reason: HOSPADM

## 2019-01-23 RX ORDER — BUPIVACAINE HYDROCHLORIDE AND EPINEPHRINE 5; 5 MG/ML; UG/ML
INJECTION, SOLUTION PERINEURAL PRN
Status: DISCONTINUED | OUTPATIENT
Start: 2019-01-23 | End: 2019-01-23 | Stop reason: HOSPADM

## 2019-01-23 RX ORDER — ATENOLOL 50 MG/1
100 TABLET ORAL DAILY
Status: DISCONTINUED | OUTPATIENT
Start: 2019-01-24 | End: 2019-01-24 | Stop reason: HOSPADM

## 2019-01-23 RX ORDER — DEXAMETHASONE SODIUM PHOSPHATE 4 MG/ML
4 INJECTION, SOLUTION INTRA-ARTICULAR; INTRALESIONAL; INTRAMUSCULAR; INTRAVENOUS; SOFT TISSUE EVERY 10 MIN PRN
Status: DISCONTINUED | OUTPATIENT
Start: 2019-01-23 | End: 2019-01-23 | Stop reason: HOSPADM

## 2019-01-23 RX ORDER — HYDRALAZINE HYDROCHLORIDE 20 MG/ML
2.5-5 INJECTION INTRAMUSCULAR; INTRAVENOUS EVERY 10 MIN PRN
Status: DISCONTINUED | OUTPATIENT
Start: 2019-01-23 | End: 2019-01-23 | Stop reason: HOSPADM

## 2019-01-23 RX ORDER — MAGNESIUM HYDROXIDE 1200 MG/15ML
LIQUID ORAL PRN
Status: DISCONTINUED | OUTPATIENT
Start: 2019-01-23 | End: 2019-01-23 | Stop reason: HOSPADM

## 2019-01-23 RX ORDER — ACETAMINOPHEN 500 MG
500-1000 TABLET ORAL PRN
COMMUNITY

## 2019-01-23 RX ORDER — PROCHLORPERAZINE MALEATE 5 MG
5 TABLET ORAL EVERY 6 HOURS PRN
Status: DISCONTINUED | OUTPATIENT
Start: 2019-01-23 | End: 2019-01-24 | Stop reason: HOSPADM

## 2019-01-23 RX ORDER — SODIUM CHLORIDE, SODIUM LACTATE, POTASSIUM CHLORIDE, CALCIUM CHLORIDE 600; 310; 30; 20 MG/100ML; MG/100ML; MG/100ML; MG/100ML
INJECTION, SOLUTION INTRAVENOUS CONTINUOUS
Status: DISCONTINUED | OUTPATIENT
Start: 2019-01-23 | End: 2019-01-23 | Stop reason: HOSPADM

## 2019-01-23 RX ORDER — NALOXONE HYDROCHLORIDE 0.4 MG/ML
.1-.4 INJECTION, SOLUTION INTRAMUSCULAR; INTRAVENOUS; SUBCUTANEOUS
Status: DISCONTINUED | OUTPATIENT
Start: 2019-01-23 | End: 2019-01-23 | Stop reason: HOSPADM

## 2019-01-23 RX ORDER — IBUPROFEN 200 MG
200-800 TABLET ORAL EVERY 4 HOURS PRN
COMMUNITY

## 2019-01-23 RX ORDER — FENTANYL CITRATE 50 UG/ML
INJECTION, SOLUTION INTRAMUSCULAR; INTRAVENOUS PRN
Status: DISCONTINUED | OUTPATIENT
Start: 2019-01-23 | End: 2019-01-23

## 2019-01-23 RX ORDER — DEXAMETHASONE SODIUM PHOSPHATE 4 MG/ML
4 INJECTION, SOLUTION INTRA-ARTICULAR; INTRALESIONAL; INTRAMUSCULAR; INTRAVENOUS; SOFT TISSUE
Status: DISCONTINUED | OUTPATIENT
Start: 2019-01-23 | End: 2019-01-23 | Stop reason: HOSPADM

## 2019-01-23 RX ORDER — CEFAZOLIN SODIUM 1 G/3ML
1 INJECTION, POWDER, FOR SOLUTION INTRAMUSCULAR; INTRAVENOUS SEE ADMIN INSTRUCTIONS
Status: DISCONTINUED | OUTPATIENT
Start: 2019-01-23 | End: 2019-01-23 | Stop reason: HOSPADM

## 2019-01-23 RX ORDER — HYDROMORPHONE HYDROCHLORIDE 1 MG/ML
0.2 INJECTION, SOLUTION INTRAMUSCULAR; INTRAVENOUS; SUBCUTANEOUS
Status: DISCONTINUED | OUTPATIENT
Start: 2019-01-23 | End: 2019-01-24 | Stop reason: HOSPADM

## 2019-01-23 RX ORDER — ALBUTEROL SULFATE 90 UG/1
2 AEROSOL, METERED RESPIRATORY (INHALATION) EVERY 6 HOURS PRN
Status: DISCONTINUED | OUTPATIENT
Start: 2019-01-23 | End: 2019-01-24 | Stop reason: HOSPADM

## 2019-01-23 RX ORDER — KETOROLAC TROMETHAMINE 30 MG/ML
30 INJECTION, SOLUTION INTRAMUSCULAR; INTRAVENOUS
Status: DISCONTINUED | OUTPATIENT
Start: 2019-01-23 | End: 2019-01-23 | Stop reason: HOSPADM

## 2019-01-23 RX ORDER — AMLODIPINE BESYLATE 5 MG/1
5 TABLET ORAL DAILY
Status: DISCONTINUED | OUTPATIENT
Start: 2019-01-24 | End: 2019-01-24 | Stop reason: HOSPADM

## 2019-01-23 RX ORDER — ONDANSETRON 2 MG/ML
4 INJECTION INTRAMUSCULAR; INTRAVENOUS EVERY 30 MIN PRN
Status: DISCONTINUED | OUTPATIENT
Start: 2019-01-23 | End: 2019-01-23 | Stop reason: HOSPADM

## 2019-01-23 RX ORDER — AMOXICILLIN 250 MG
2 CAPSULE ORAL EVERY MORNING
Status: DISCONTINUED | OUTPATIENT
Start: 2019-01-24 | End: 2019-01-24 | Stop reason: HOSPADM

## 2019-01-23 RX ORDER — NEOSTIGMINE METHYLSULFATE 1 MG/ML
VIAL (ML) INJECTION PRN
Status: DISCONTINUED | OUTPATIENT
Start: 2019-01-23 | End: 2019-01-23

## 2019-01-23 RX ORDER — CEFAZOLIN SODIUM 1 G/3ML
1 INJECTION, POWDER, FOR SOLUTION INTRAMUSCULAR; INTRAVENOUS EVERY 8 HOURS
Status: DISCONTINUED | OUTPATIENT
Start: 2019-01-24 | End: 2019-01-24 | Stop reason: HOSPADM

## 2019-01-23 RX ORDER — ONDANSETRON 2 MG/ML
INJECTION INTRAMUSCULAR; INTRAVENOUS PRN
Status: DISCONTINUED | OUTPATIENT
Start: 2019-01-23 | End: 2019-01-23

## 2019-01-23 RX ORDER — KETOROLAC TROMETHAMINE 15 MG/ML
15 INJECTION, SOLUTION INTRAMUSCULAR; INTRAVENOUS EVERY 6 HOURS
Status: DISCONTINUED | OUTPATIENT
Start: 2019-01-23 | End: 2019-01-24 | Stop reason: HOSPADM

## 2019-01-23 RX ORDER — LIDOCAINE 40 MG/G
CREAM TOPICAL
Status: DISCONTINUED | OUTPATIENT
Start: 2019-01-23 | End: 2019-01-24 | Stop reason: HOSPADM

## 2019-01-23 RX ORDER — PHENAZOPYRIDINE HYDROCHLORIDE 200 MG/1
200 TABLET, FILM COATED ORAL
Status: DISCONTINUED | OUTPATIENT
Start: 2019-01-23 | End: 2019-01-23 | Stop reason: HOSPADM

## 2019-01-23 RX ORDER — GLYCOPYRROLATE 0.2 MG/ML
INJECTION, SOLUTION INTRAMUSCULAR; INTRAVENOUS PRN
Status: DISCONTINUED | OUTPATIENT
Start: 2019-01-23 | End: 2019-01-23

## 2019-01-23 RX ORDER — ALLOPURINOL 300 MG/1
300 TABLET ORAL DAILY
Status: DISCONTINUED | OUTPATIENT
Start: 2019-01-23 | End: 2019-01-24 | Stop reason: HOSPADM

## 2019-01-23 RX ORDER — HYDROCODONE BITARTRATE AND ACETAMINOPHEN 5; 325 MG/1; MG/1
1-2 TABLET ORAL EVERY 4 HOURS PRN
Status: DISCONTINUED | OUTPATIENT
Start: 2019-01-23 | End: 2019-01-24 | Stop reason: HOSPADM

## 2019-01-23 RX ORDER — BUPIVACAINE HYDROCHLORIDE 2.5 MG/ML
INJECTION, SOLUTION INFILTRATION; PERINEURAL PRN
Status: DISCONTINUED | OUTPATIENT
Start: 2019-01-23 | End: 2019-01-23 | Stop reason: HOSPADM

## 2019-01-23 RX ORDER — CEFAZOLIN SODIUM 2 G/100ML
2 INJECTION, SOLUTION INTRAVENOUS
Status: COMPLETED | OUTPATIENT
Start: 2019-01-23 | End: 2019-01-23

## 2019-01-23 RX ORDER — ESTRADIOL 0.1 MG/G
CREAM VAGINAL
Qty: 42.5 G | Refills: 0 | Status: SHIPPED | OUTPATIENT
Start: 2019-01-23 | End: 2019-12-05

## 2019-01-23 RX ORDER — LOSARTAN POTASSIUM 50 MG/1
50 TABLET ORAL 2 TIMES DAILY
Status: DISCONTINUED | OUTPATIENT
Start: 2019-01-23 | End: 2019-01-24 | Stop reason: HOSPADM

## 2019-01-23 RX ORDER — CIPROFLOXACIN 500 MG/1
500 TABLET, FILM COATED ORAL 2 TIMES DAILY
Qty: 10 TABLET | Refills: 0 | Status: SHIPPED | OUTPATIENT
Start: 2019-01-23 | End: 2019-01-28

## 2019-01-23 RX ORDER — LEVOTHYROXINE SODIUM 75 UG/1
75 TABLET ORAL DAILY
Status: DISCONTINUED | OUTPATIENT
Start: 2019-01-24 | End: 2019-01-24 | Stop reason: HOSPADM

## 2019-01-23 RX ORDER — ACETAMINOPHEN 325 MG/1
325-650 TABLET ORAL EVERY 4 HOURS PRN
Status: DISCONTINUED | OUTPATIENT
Start: 2019-01-23 | End: 2019-01-24 | Stop reason: HOSPADM

## 2019-01-23 RX ORDER — SODIUM CHLORIDE, SODIUM LACTATE, POTASSIUM CHLORIDE, CALCIUM CHLORIDE 600; 310; 30; 20 MG/100ML; MG/100ML; MG/100ML; MG/100ML
INJECTION, SOLUTION INTRAVENOUS CONTINUOUS PRN
Status: DISCONTINUED | OUTPATIENT
Start: 2019-01-23 | End: 2019-01-23

## 2019-01-23 RX ORDER — HYDROMORPHONE HYDROCHLORIDE 1 MG/ML
.3-.5 INJECTION, SOLUTION INTRAMUSCULAR; INTRAVENOUS; SUBCUTANEOUS EVERY 5 MIN PRN
Status: DISCONTINUED | OUTPATIENT
Start: 2019-01-23 | End: 2019-01-23 | Stop reason: HOSPADM

## 2019-01-23 RX ORDER — LIDOCAINE HYDROCHLORIDE 20 MG/ML
INJECTION, SOLUTION INFILTRATION; PERINEURAL PRN
Status: DISCONTINUED | OUTPATIENT
Start: 2019-01-23 | End: 2019-01-23

## 2019-01-23 RX ORDER — VECURONIUM BROMIDE 1 MG/ML
INJECTION, POWDER, LYOPHILIZED, FOR SOLUTION INTRAVENOUS PRN
Status: DISCONTINUED | OUTPATIENT
Start: 2019-01-23 | End: 2019-01-23

## 2019-01-23 RX ORDER — HYDROMORPHONE HYDROCHLORIDE 1 MG/ML
.3-.5 INJECTION, SOLUTION INTRAMUSCULAR; INTRAVENOUS; SUBCUTANEOUS EVERY 10 MIN PRN
Status: DISCONTINUED | OUTPATIENT
Start: 2019-01-23 | End: 2019-01-23 | Stop reason: HOSPADM

## 2019-01-23 RX ORDER — POLYETHYLENE GLYCOL 3350 17 G/17G
17 POWDER, FOR SOLUTION ORAL DAILY
Status: DISCONTINUED | OUTPATIENT
Start: 2019-01-24 | End: 2019-01-24 | Stop reason: HOSPADM

## 2019-01-23 RX ORDER — ONDANSETRON 4 MG/1
4 TABLET, ORALLY DISINTEGRATING ORAL EVERY 6 HOURS PRN
Status: DISCONTINUED | OUTPATIENT
Start: 2019-01-23 | End: 2019-01-24 | Stop reason: HOSPADM

## 2019-01-23 RX ORDER — EPHEDRINE SULFATE 50 MG/ML
INJECTION, SOLUTION INTRAMUSCULAR; INTRAVENOUS; SUBCUTANEOUS PRN
Status: DISCONTINUED | OUTPATIENT
Start: 2019-01-23 | End: 2019-01-23

## 2019-01-23 RX ORDER — DOCUSATE SODIUM 100 MG/1
100 CAPSULE, LIQUID FILLED ORAL 2 TIMES DAILY
Qty: 60 CAPSULE | Refills: 0 | Status: SHIPPED | OUTPATIENT
Start: 2019-01-23 | End: 2019-02-22

## 2019-01-23 RX ORDER — LIDOCAINE 40 MG/G
CREAM TOPICAL
Status: DISCONTINUED | OUTPATIENT
Start: 2019-01-23 | End: 2019-01-23 | Stop reason: HOSPADM

## 2019-01-23 RX ORDER — HYDROCODONE BITARTRATE AND ACETAMINOPHEN 5; 325 MG/1; MG/1
1 TABLET ORAL EVERY 6 HOURS PRN
Qty: 18 TABLET | Refills: 0 | Status: SHIPPED | OUTPATIENT
Start: 2019-01-23 | End: 2019-01-26

## 2019-01-23 RX ORDER — NALOXONE HYDROCHLORIDE 0.4 MG/ML
.1-.4 INJECTION, SOLUTION INTRAMUSCULAR; INTRAVENOUS; SUBCUTANEOUS
Status: DISCONTINUED | OUTPATIENT
Start: 2019-01-23 | End: 2019-01-24 | Stop reason: HOSPADM

## 2019-01-23 RX ORDER — NALOXONE HYDROCHLORIDE 0.4 MG/ML
.1-.4 INJECTION, SOLUTION INTRAMUSCULAR; INTRAVENOUS; SUBCUTANEOUS
Status: DISCONTINUED | OUTPATIENT
Start: 2019-01-23 | End: 2019-01-23

## 2019-01-23 RX ORDER — PROPOFOL 10 MG/ML
INJECTION, EMULSION INTRAVENOUS PRN
Status: DISCONTINUED | OUTPATIENT
Start: 2019-01-23 | End: 2019-01-23

## 2019-01-23 RX ORDER — CEFAZOLIN SODIUM 1 G/3ML
INJECTION, POWDER, FOR SOLUTION INTRAMUSCULAR; INTRAVENOUS PRN
Status: DISCONTINUED | OUTPATIENT
Start: 2019-01-23 | End: 2019-01-23

## 2019-01-23 RX ORDER — CALCIUM CARBONATE 500 MG/1
1-2 TABLET, CHEWABLE ORAL PRN
COMMUNITY

## 2019-01-23 RX ORDER — ONDANSETRON 2 MG/ML
4 INJECTION INTRAMUSCULAR; INTRAVENOUS EVERY 6 HOURS PRN
Status: DISCONTINUED | OUTPATIENT
Start: 2019-01-23 | End: 2019-01-24 | Stop reason: HOSPADM

## 2019-01-23 RX ADMIN — INSULIN ASPART 1 UNITS: 100 INJECTION, SOLUTION INTRAVENOUS; SUBCUTANEOUS at 22:02

## 2019-01-23 RX ADMIN — VECURONIUM BROMIDE 1 MG: 1 INJECTION, POWDER, LYOPHILIZED, FOR SOLUTION INTRAVENOUS at 14:02

## 2019-01-23 RX ADMIN — VECURONIUM BROMIDE 1 MG: 1 INJECTION, POWDER, LYOPHILIZED, FOR SOLUTION INTRAVENOUS at 14:16

## 2019-01-23 RX ADMIN — PHENYLEPHRINE HYDROCHLORIDE 50 MCG: 10 INJECTION, SOLUTION INTRAMUSCULAR; INTRAVENOUS; SUBCUTANEOUS at 15:15

## 2019-01-23 RX ADMIN — CEFAZOLIN SODIUM 1 G: 2 INJECTION, SOLUTION INTRAVENOUS at 16:50

## 2019-01-23 RX ADMIN — FENTANYL CITRATE 50 MCG: 50 INJECTION, SOLUTION INTRAMUSCULAR; INTRAVENOUS at 14:10

## 2019-01-23 RX ADMIN — FENTANYL CITRATE 50 MCG: 50 INJECTION, SOLUTION INTRAMUSCULAR; INTRAVENOUS at 14:31

## 2019-01-23 RX ADMIN — ONDANSETRON 4 MG: 2 INJECTION INTRAMUSCULAR; INTRAVENOUS at 16:43

## 2019-01-23 RX ADMIN — Medication 5 MG: at 13:24

## 2019-01-23 RX ADMIN — GLYCOPYRROLATE 0.8 MG: 0.2 INJECTION, SOLUTION INTRAMUSCULAR; INTRAVENOUS at 16:50

## 2019-01-23 RX ADMIN — VECURONIUM BROMIDE 1 MG: 1 INJECTION, POWDER, LYOPHILIZED, FOR SOLUTION INTRAVENOUS at 15:31

## 2019-01-23 RX ADMIN — HYDROMORPHONE HYDROCHLORIDE 0.5 MG: 1 INJECTION, SOLUTION INTRAMUSCULAR; INTRAVENOUS; SUBCUTANEOUS at 17:44

## 2019-01-23 RX ADMIN — PHENAZOPYRIDINE HYDROCHLORIDE 200 MG: 200 TABLET ORAL at 11:50

## 2019-01-23 RX ADMIN — PHENYLEPHRINE HYDROCHLORIDE 50 MCG: 10 INJECTION, SOLUTION INTRAMUSCULAR; INTRAVENOUS; SUBCUTANEOUS at 13:22

## 2019-01-23 RX ADMIN — HYDROMORPHONE HYDROCHLORIDE 0.2 MG: 1 INJECTION, SOLUTION INTRAMUSCULAR; INTRAVENOUS; SUBCUTANEOUS at 20:07

## 2019-01-23 RX ADMIN — FENTANYL CITRATE 50 MCG: 50 INJECTION, SOLUTION INTRAMUSCULAR; INTRAVENOUS at 13:36

## 2019-01-23 RX ADMIN — SODIUM CHLORIDE: 9 INJECTION, SOLUTION INTRAVENOUS at 19:47

## 2019-01-23 RX ADMIN — FENTANYL CITRATE 25 MCG: 50 INJECTION, SOLUTION INTRAMUSCULAR; INTRAVENOUS at 16:02

## 2019-01-23 RX ADMIN — ATORVASTATIN CALCIUM 20 MG: 10 TABLET, FILM COATED ORAL at 22:03

## 2019-01-23 RX ADMIN — VECURONIUM BROMIDE 2 MG: 1 INJECTION, POWDER, LYOPHILIZED, FOR SOLUTION INTRAVENOUS at 13:47

## 2019-01-23 RX ADMIN — PROPOFOL 30 MG: 10 INJECTION, EMULSION INTRAVENOUS at 13:36

## 2019-01-23 RX ADMIN — Medication 5 MG: at 15:15

## 2019-01-23 RX ADMIN — FENTANYL CITRATE 50 MCG: 50 INJECTION, SOLUTION INTRAMUSCULAR; INTRAVENOUS at 12:54

## 2019-01-23 RX ADMIN — HYDROMORPHONE HYDROCHLORIDE 0.2 MG: 1 INJECTION, SOLUTION INTRAMUSCULAR; INTRAVENOUS; SUBCUTANEOUS at 23:25

## 2019-01-23 RX ADMIN — VECURONIUM BROMIDE 1 MG: 1 INJECTION, POWDER, LYOPHILIZED, FOR SOLUTION INTRAVENOUS at 15:00

## 2019-01-23 RX ADMIN — ALLOPURINOL 300 MG: 300 TABLET ORAL at 20:10

## 2019-01-23 RX ADMIN — SODIUM CHLORIDE, POTASSIUM CHLORIDE, SODIUM LACTATE AND CALCIUM CHLORIDE: 600; 310; 30; 20 INJECTION, SOLUTION INTRAVENOUS at 12:54

## 2019-01-23 RX ADMIN — NEOSTIGMINE METHYLSULFATE 4 MG: 1 INJECTION, SOLUTION INTRAVENOUS at 16:50

## 2019-01-23 RX ADMIN — LOSARTAN POTASSIUM 50 MG: 50 TABLET, FILM COATED ORAL at 20:10

## 2019-01-23 RX ADMIN — PROPOFOL 30 MCG/KG/MIN: 10 INJECTION, EMULSION INTRAVENOUS at 13:04

## 2019-01-23 RX ADMIN — PROPOFOL 120 MG: 10 INJECTION, EMULSION INTRAVENOUS at 12:57

## 2019-01-23 RX ADMIN — Medication 5 MG: at 13:13

## 2019-01-23 RX ADMIN — HYDROMORPHONE HYDROCHLORIDE 0.5 MG: 1 INJECTION, SOLUTION INTRAMUSCULAR; INTRAVENOUS; SUBCUTANEOUS at 17:55

## 2019-01-23 RX ADMIN — MIDAZOLAM 2 MG: 1 INJECTION INTRAMUSCULAR; INTRAVENOUS at 12:54

## 2019-01-23 RX ADMIN — ROCURONIUM BROMIDE 50 MG: 10 INJECTION INTRAVENOUS at 12:57

## 2019-01-23 RX ADMIN — DEXAMETHASONE SODIUM PHOSPHATE 4 MG: 4 INJECTION, SOLUTION INTRA-ARTICULAR; INTRALESIONAL; INTRAMUSCULAR; INTRAVENOUS; SOFT TISSUE at 13:13

## 2019-01-23 RX ADMIN — VECURONIUM BROMIDE 2 MG: 1 INJECTION, POWDER, LYOPHILIZED, FOR SOLUTION INTRAVENOUS at 14:31

## 2019-01-23 RX ADMIN — VECURONIUM BROMIDE 1 MG: 1 INJECTION, POWDER, LYOPHILIZED, FOR SOLUTION INTRAVENOUS at 13:31

## 2019-01-23 RX ADMIN — KETOROLAC TROMETHAMINE 15 MG: 15 INJECTION, SOLUTION INTRAMUSCULAR; INTRAVENOUS at 22:01

## 2019-01-23 RX ADMIN — CIPROFLOXACIN 400 MG: 2 INJECTION, SOLUTION INTRAVENOUS at 20:11

## 2019-01-23 RX ADMIN — Medication 1 LOZENGE: at 17:42

## 2019-01-23 RX ADMIN — PHENYLEPHRINE HYDROCHLORIDE 50 MCG: 10 INJECTION, SOLUTION INTRAMUSCULAR; INTRAVENOUS; SUBCUTANEOUS at 13:24

## 2019-01-23 RX ADMIN — CEFAZOLIN 1 G: 1 INJECTION, POWDER, FOR SOLUTION INTRAMUSCULAR; INTRAVENOUS at 15:02

## 2019-01-23 RX ADMIN — LIDOCAINE HYDROCHLORIDE 100 MG: 20 INJECTION, SOLUTION INFILTRATION; PERINEURAL at 12:57

## 2019-01-23 RX ADMIN — CEFAZOLIN SODIUM 2 G: 2 INJECTION, SOLUTION INTRAVENOUS at 13:06

## 2019-01-23 RX ADMIN — SODIUM CHLORIDE, POTASSIUM CHLORIDE, SODIUM LACTATE AND CALCIUM CHLORIDE: 600; 310; 30; 20 INJECTION, SOLUTION INTRAVENOUS at 14:51

## 2019-01-23 RX ADMIN — Medication 10 MG: at 13:18

## 2019-01-23 RX ADMIN — HYDROMORPHONE HYDROCHLORIDE 0.5 MG: 1 INJECTION, SOLUTION INTRAMUSCULAR; INTRAVENOUS; SUBCUTANEOUS at 13:33

## 2019-01-23 RX ADMIN — ONDANSETRON 4 MG: 2 INJECTION INTRAMUSCULAR; INTRAVENOUS at 20:26

## 2019-01-23 ASSESSMENT — MIFFLIN-ST. JEOR: SCORE: 1402.95

## 2019-01-23 NOTE — BRIEF OP NOTE
[facility]    Brief Operative Note    Pre-operative diagnosis: GENITAL PROLAPSE, URINARY INCONTINENCE  Post-operative diagnosis same  Procedure: Procedure(s):  ROBOTIC ASSISTED XI SUPRACERVICAL HYSTERECTOMY, Rectocele repair  DAVINCI XI BILATERAL SALPINGO-OOPHORECTOMY  DAVINCI XI SACROCOLPOPEXY, MIDURETHRAL SLING, CYSTOSCOPY  Davinci Lysis of Adhesions  Surgeon: Surgeon(s) and Role:  Panel 1:     * Santos Conklin MD - Primary  Panel 2:     * Cr Lo MD - Primary     * Laura Jack PA-C - Assisting  Anesthesia: General   Estimated blood loss: 20 cc  Drains: None  Specimens:   ID Type Source Tests Collected by Time Destination   A : Uterus, Bilateral Ovaries, Bilateral Fallopian tubes Tissue Uterus with Bilateral Ovaries and Fallopian Tubes SURGICAL PATHOLOGY EXAM Santos Conklin MD 1/23/2019  4:10 PM    B : Vaginal Mucosa Tissue Vagina SURGICAL PATHOLOGY EXAM Santos Conklin MD 1/23/2019  4:42 PM      Findings:   Grade III cystocele and uterine/apical prolapse, grade 1-2 rectocele,  normal pelvis, uterus, tubes and ovaries.  Adhesions of the small bowel to sigmoid epiploicae.  Excellent post operative result.  No apparent complications. .  Complications: None.  Implants: None.

## 2019-01-23 NOTE — PROGRESS NOTES
Admission medication history interview status for the 1/23/2019  admission is complete. See EPIC admission navigator for prior to admission medications     Medication history source reliability:Good    Medication history interview source(s):Patient    Medication history resources (including written lists, pill bottles, clinic record):None    Primary pharmacy[facility]    Additional medication history information not noted on PTA med list :none    Time spent in this activity: 30 minutes    Prior to Admission medications    Medication Sig Last Dose Taking? Auth Provider   acetaminophen (TYLENOL) 500 MG tablet Take 500-1,000 mg by mouth as needed for mild pain 1/15/2019 at prn Yes Reported, Patient   albuterol (PROAIR HFA/PROVENTIL HFA/VENTOLIN HFA) 108 (90 Base) MCG/ACT inhaler Inhale 2 puffs into the lungs every 6 hours as needed for shortness of breath / dyspnea or wheezing 12/2/2018 at prn Yes Wilmar Boyer MD   allopurinol (ZYLOPRIM) 300 MG tablet TAKE ONE TABLET BY MOUTH ONE TIME DAILY  1/22/2019 at 0900 Yes Wilmar Boyer MD   amLODIPine (NORVASC) 5 MG tablet Take 1 tablet (5 mg) by mouth daily 1/23/2019 at 0700 Yes Wilmar Boyer MD   aspirin 81 MG EC tablet Take 81 mg by mouth At Bedtime 1/8/2019 at 0800 Yes Reported, Patient   atenolol (TENORMIN) 50 MG tablet TAKE TWO TABLETS BY MOUTH DAILY  1/23/2019 at 0700 Yes Wilmar Boyer MD   atorvastatin (LIPITOR) 20 MG tablet Take 1 tablet (20 mg) by mouth daily 1/22/2019 at 2230 Yes Chris Lozano MD   calcium carbonate (TUMS) 500 MG chewable tablet Take 1-2 chew tab by mouth as needed for heartburn 1/18/2019 at prn Yes Reported, Patient   hydrochlorothiazide (HYDRODIURIL) 25 MG tablet TAKE ONE TABLET BY MOUTH IN THE MORNING  1/22/2019 at 0900 Yes Wilmar Boyer MD   ibuprofen (ADVIL/MOTRIN) 200 MG tablet Take 200-800 mg by mouth every 4 hours as needed for mild pain 1/8/2019 at prn Yes Reported, Patient   levothyroxine (SYNTHROID/LEVOTHROID) 75 MCG tablet  Take 1 tablet (75 mcg) by mouth daily 1/23/2019 at 0700 Yes Wilmar Boyer MD   losartan (COZAAR) 100 MG tablet TAKE HALF TABLET BY MOUTH TWICE DAILY  1/22/2019 at 2100 Yes Wilmar Boyer MD   metFORMIN (GLUCOPHAGE) 500 MG tablet TAKE ONE TABLET BY MOUTH TWICE DAILY WITH MEALS. 1/22/2019 at 2100 Yes Wilmar Boyer MD   blood glucose monitoring (ACCU-CHEK MARSHALL PLUS) meter device kit    Reported, Patient   blood glucose monitoring (ACCU-CHEK MARSHALL PLUS) test strip Check blood sugars once daily   Wilmar Boyer MD   blood glucose monitoring (SOFTCLIX) lancets Use to test blood sugar 1 times daily or as directed.   Wilmar Boyer MD   Omega-3 Fatty Acids (OMEGA-3 FISH OIL) 1000 MG CAPS Take 1 capsule (1 g) by mouth daily  Patient taking differently: Take 2 g by mouth daily (Patient taking [id number]) 1/8/2019  Wilmar Boyer MD

## 2019-01-23 NOTE — ANESTHESIA CARE TRANSFER NOTE
Patient: Jenna Card    Procedure(s):  ROBOTIC ASSISTED XI SUPRACERVICAL HYSTERECTOMY, Rectocele repair  DAVINCI XI BILATERAL SALPINGO-OOPHORECTOMY  DAVINCI XI SACROCOLPOPEXY, MIDURETHRAL SLING, CYSTOSCOPY  Davinci Lysis of Adhesions    Diagnosis: GENITAL PROLAPSE   Diagnosis Additional Information: No value filed.    Anesthesia Type:   General, ETT     Note:  Airway :Face Mask  Patient transferred to:Phase II  Comments: Neuromuscular blockade reversed after TOF 4/4, spontaneous respirations, adequate tidal volumes, followed commands to voice, oropharynx suctioned with soft flexible catheter, extubated atraumatically, extubated with suction, airway patent after extubation.  Oxygen via facemask at 6 liters per minute to PACU. Oxygen tubing connected to wall O2 in PACU, SpO2, NiBP, and EKG monitors and alarms on and functioning, Maria Fernanda Hugger warmer connected to patient gown, report on patient's clinical status given to PACU RN, RN questions answered. Handoff Report: Identifed the Patient, Identified the Reponsible Provider, Reviewed the pertinent medical history, Discussed the surgical course, Reviewed Intra-OP anesthesia mangement and issues during anesthesia, Set expectations for post-procedure period and Allowed opportunity for questions and acknowledgement of understanding      Vitals: (Last set prior to Anesthesia Care Transfer)    CRNA VITALS  1/23/2019 1633 - 1/23/2019 1711      1/23/2019             Pulse:  77    SpO2:  98 %    Resp Rate (observed):  11    Resp Rate (set):  bp:  9  141/87                Electronically Signed By: ANSON Morocho CRNA  January 23, 2019  5:11 PM

## 2019-01-23 NOTE — ANESTHESIA PREPROCEDURE EVALUATION
Anesthesia Pre-Procedure Evaluation    Patient: Jenna Card   MRN: [id number] : 1955          Preoperative Diagnosis: GENITAL PROLAPSE     Procedure(s):  ROBOTIC ASSISTED XI SUPRACERVICAL HYSTERECTOMY BILATERAL SALPINGO-OOPHORECTOMY (CURRAN) ROBOTIC ASSISTED XI SACROCOLPOPEXY, MID URETHERAL SLING, CYSTOSCOPY (DEEPAK)  DAVINCI XI SALPINGO-OOPHORECTOMY INCLUDING BILATERAL  DAVINCI XI SACROCOLPOPEXY, MIDURETHRAL SLING, CYSTOSCOPY    Past Medical History:   Diagnosis Date     CAD (coronary artery disease)      Per CT calcium score study done      Celiac disease      Cough     most laureano.  Cough lasting 6-8 weeks     Gout      Hepatitis     trhought related to celiac disease along with possible steatohepatitis     Hyperlipidemia LDL goal <100      Hypothyroidism 2014     Obesity 2014     BIJU on CPAP      Polyp of colon 2009    3mm. Hyperplastic     Past Surgical History:   Procedure Laterality Date     BIOPSY      Dermatitis Herpetiformis diagnosis     C NONSPECIFIC PROCEDURE Left     arthroscopic knee surgery     C NONSPECIFIC PROCEDURE       D&C for dysfunctional uterine bleeding - findings benign     COLONOSCOPY      1 polyp removed     ENT SURGERY      tonsils removed     ORTHOPEDIC SURGERY      arthroscopic left knee       Anesthesia Evaluation     . Pt has had prior anesthetic.     No history of anesthetic complications          ROS/MED HX    ENT/Pulmonary:     (+)sleep apnea, Intermittent asthma Treatment: Inhaler prn,  doesn't use CPAP , . .    Neurologic:  - neg neurologic ROS     Cardiovascular:     (+) Dyslipidemia, hypertension--CAD, --. : . . . :. . Previous cardiac testing Echodate:2018results:Left ventricular systolic function is normal.  The visual ejection fraction is estimated at 55-60%.  The right ventricle is normal in structure, function and size.  No hemodynamically significant valvular abnormalities.  The rhythm was sinus  "bradycardia.  Normal transthoracic echocardiogram. There is no comparison study availabledate: results: date: results: date: results:          METS/Exercise Tolerance:     Hematologic:  - neg hematologic  ROS       Musculoskeletal:   (+) arthritis, , , -       GI/Hepatic:     (+) hepatitis type Other, liver disease (celiac disease), Other GI/Hepatic      (-) GERD   Renal/Genitourinary:  - ROS Renal section negative       Endo:     (+) type II DM thyroid problem hypothyroidism, Obesity, .      Psychiatric:         Infectious Disease:         Malignancy:         Other:                          Physical Exam  Normal systems: cardiovascular, pulmonary and dental    Airway   Mallampati: II  TM distance: >3 FB  Neck ROM: full    Dental     Cardiovascular       Pulmonary             Lab Results   Component Value Date    WBC 9.5 01/17/2019    HGB 13.6 01/17/2019    HCT 41.0 01/17/2019     01/17/2019    SED 11 08/30/2018     01/15/2019    POTASSIUM 4.8 01/15/2019    CHLORIDE 105 01/15/2019    CO2 26 01/15/2019    BUN 20 01/15/2019    CR 0.78 01/15/2019     (H) 01/15/2019    LEONOR 9.4 01/15/2019    ALBUMIN 3.8 01/15/2019    PROTTOTAL 7.1 01/15/2019    ALT 43 01/15/2019    AST 47 (H) 01/15/2019    ALKPHOS 63 01/15/2019    BILITOTAL 0.6 01/15/2019    TSH 0.88 01/15/2019    T4 1.27 08/30/2018       Preop Vitals  BP Readings from Last 3 Encounters:   01/17/19 146/78   11/29/18 150/80   10/04/18 159/68    Pulse Readings from Last 3 Encounters:   01/17/19 59   11/29/18 58   10/04/18 60      Resp Readings from Last 3 Encounters:   01/17/19 16   11/29/18 16   07/16/18 16    SpO2 Readings from Last 3 Encounters:   01/17/19 98%   11/29/18 97%   10/04/18 98%      Temp Readings from Last 1 Encounters:   01/17/19 36.9  C (98.5  F) (Oral)    Ht Readings from Last 1 Encounters:   01/17/19 1.702 m (5' 7\")      Wt Readings from Last 1 Encounters:   01/17/19 83.9 kg (185 lb)    Estimated body mass index is 28.98 kg/m  as " "calculated from the following:    Height as of 1/17/19: 1.702 m (5' 7\").    Weight as of 1/17/19: 83.9 kg (185 lb).       Anesthesia Plan      History & Physical Review  History and physical reviewed and following examination; no interval change.    ASA Status:  3 .    NPO Status:  > 8 hours    Plan for General and ETT with Intravenous and Propofol induction. Maintenance will be Balanced.    PONV prophylaxis:  Ondansetron (or other 5HT-3) and Dexamethasone or Solumedrol       Postoperative Care  Postoperative pain management:  Multi-modal analgesia.      Consents  Anesthetic plan, risks, benefits and alternatives discussed with:  Patient..                 Gio Muñoz MD  "

## 2019-01-23 NOTE — INTERVAL H&P NOTE
I met with the patient in the pre-induction area and once again discussed the proposed procedure, pros, cons, risks and benefits as well as the potential complications.  She understands the GYN portion of the procedure is to be a daVinci supracervical hysterectomy, bilateral salpingo-oophorectomy and possible rectocele repair in combination with the sacrocolpopexy, midurethral sling procedure and cystoscopy.  She is aware that mesh material will be used during this procedure with the associated pros, cons and risks.  An overnight observational stay is planned with discharge to be in the next 24-36 hours.  All questions were answered and we will proceed.

## 2019-01-24 VITALS
HEIGHT: 67 IN | WEIGHT: 183.7 LBS | SYSTOLIC BLOOD PRESSURE: 111 MMHG | BODY MASS INDEX: 28.83 KG/M2 | RESPIRATION RATE: 16 BRPM | HEART RATE: 63 BPM | DIASTOLIC BLOOD PRESSURE: 52 MMHG | TEMPERATURE: 98.3 F | OXYGEN SATURATION: 94 %

## 2019-01-24 LAB
ANION GAP SERPL CALCULATED.3IONS-SCNC: 8 MMOL/L (ref 3–14)
BUN SERPL-MCNC: 18 MG/DL (ref 7–30)
CALCIUM SERPL-MCNC: 8.3 MG/DL (ref 8.5–10.1)
CHLORIDE SERPL-SCNC: 101 MMOL/L (ref 94–109)
CO2 SERPL-SCNC: 24 MMOL/L (ref 20–32)
CREAT SERPL-MCNC: 0.82 MG/DL (ref 0.52–1.04)
GFR SERPL CREATININE-BSD FRML MDRD: 75 ML/MIN/{1.73_M2}
GLUCOSE BLDC GLUCOMTR-MCNC: 128 MG/DL (ref 70–99)
GLUCOSE BLDC GLUCOMTR-MCNC: 130 MG/DL (ref 70–99)
GLUCOSE BLDC GLUCOMTR-MCNC: 142 MG/DL (ref 70–99)
GLUCOSE SERPL-MCNC: 135 MG/DL (ref 70–99)
HGB BLD-MCNC: 11.1 G/DL (ref 11.7–15.7)
POTASSIUM SERPL-SCNC: 4.3 MMOL/L (ref 3.4–5.3)
SODIUM SERPL-SCNC: 133 MMOL/L (ref 133–144)

## 2019-01-24 PROCEDURE — 82962 GLUCOSE BLOOD TEST: CPT

## 2019-01-24 PROCEDURE — 25000132 ZZH RX MED GY IP 250 OP 250 PS 637: Performed by: PHYSICIAN ASSISTANT

## 2019-01-24 PROCEDURE — 99207 ZZC CDG-MDM COMPONENT: MEETS LOW - DOWN CODED: CPT | Performed by: PHYSICIAN ASSISTANT

## 2019-01-24 PROCEDURE — 25000128 H RX IP 250 OP 636: Performed by: PHYSICIAN ASSISTANT

## 2019-01-24 PROCEDURE — 99207 ZZC CDG-CODE CATEGORY CHANGED: CPT | Performed by: PHYSICIAN ASSISTANT

## 2019-01-24 PROCEDURE — 80048 BASIC METABOLIC PNL TOTAL CA: CPT | Performed by: PHYSICIAN ASSISTANT

## 2019-01-24 PROCEDURE — 99225 ZZC SUBSEQUENT OBSERVATION CARE,LEVEL II: CPT | Performed by: PHYSICIAN ASSISTANT

## 2019-01-24 PROCEDURE — 36415 COLL VENOUS BLD VENIPUNCTURE: CPT | Performed by: PHYSICIAN ASSISTANT

## 2019-01-24 PROCEDURE — 85018 HEMOGLOBIN: CPT | Performed by: PHYSICIAN ASSISTANT

## 2019-01-24 RX ORDER — HYDROCHLOROTHIAZIDE 25 MG/1
TABLET ORAL
Qty: 90 TABLET | Refills: 1
Start: 2019-01-24 | End: 2019-01-30

## 2019-01-24 RX ORDER — NICOTINE POLACRILEX 4 MG
15-30 LOZENGE BUCCAL
Status: DISCONTINUED | OUTPATIENT
Start: 2019-01-24 | End: 2019-01-24

## 2019-01-24 RX ORDER — DEXTROSE MONOHYDRATE 25 G/50ML
25-50 INJECTION, SOLUTION INTRAVENOUS
Status: DISCONTINUED | OUTPATIENT
Start: 2019-01-24 | End: 2019-01-24

## 2019-01-24 RX ADMIN — LOSARTAN POTASSIUM 50 MG: 50 TABLET, FILM COATED ORAL at 07:40

## 2019-01-24 RX ADMIN — CIPROFLOXACIN 400 MG: 2 INJECTION, SOLUTION INTRAVENOUS at 08:48

## 2019-01-24 RX ADMIN — ENOXAPARIN SODIUM 40 MG: 40 INJECTION SUBCUTANEOUS at 08:48

## 2019-01-24 RX ADMIN — CEFAZOLIN 1 G: 1 INJECTION, POWDER, FOR SOLUTION INTRAMUSCULAR; INTRAVENOUS at 00:31

## 2019-01-24 RX ADMIN — SODIUM CHLORIDE: 9 INJECTION, SOLUTION INTRAVENOUS at 06:57

## 2019-01-24 RX ADMIN — ACETAMINOPHEN 650 MG: 325 TABLET, FILM COATED ORAL at 12:17

## 2019-01-24 RX ADMIN — SENNOSIDES AND DOCUSATE SODIUM 2 TABLET: 8.6; 5 TABLET ORAL at 07:40

## 2019-01-24 RX ADMIN — KETOROLAC TROMETHAMINE 15 MG: 15 INJECTION, SOLUTION INTRAMUSCULAR; INTRAVENOUS at 09:54

## 2019-01-24 RX ADMIN — ALLOPURINOL 300 MG: 300 TABLET ORAL at 07:40

## 2019-01-24 RX ADMIN — AMLODIPINE BESYLATE 5 MG: 5 TABLET ORAL at 07:40

## 2019-01-24 RX ADMIN — KETOROLAC TROMETHAMINE 15 MG: 15 INJECTION, SOLUTION INTRAMUSCULAR; INTRAVENOUS at 02:49

## 2019-01-24 RX ADMIN — CEFAZOLIN 1 G: 1 INJECTION, POWDER, FOR SOLUTION INTRAMUSCULAR; INTRAVENOUS at 09:54

## 2019-01-24 RX ADMIN — LEVOTHYROXINE SODIUM 75 MCG: 75 TABLET ORAL at 07:40

## 2019-01-24 RX ADMIN — ACETAMINOPHEN 650 MG: 325 TABLET, FILM COATED ORAL at 02:49

## 2019-01-24 RX ADMIN — ATENOLOL 100 MG: 50 TABLET ORAL at 07:40

## 2019-01-24 RX ADMIN — HYDROCODONE BITARTRATE AND ACETAMINOPHEN 1 TABLET: 5; 325 TABLET ORAL at 06:55

## 2019-01-24 NOTE — PLAN OF CARE
A&O.  AVSS.  Dilaudid for pain.  Zofran given for slight nausea.  Refused Capno.  Continuous pulse ox on.  IS to 1500.  Lap sites CDI.  Denies passing gas.  Vaughn with adequate urine output.

## 2019-01-24 NOTE — PLAN OF CARE
A&Ox4. VSS on 2L O2. Reports 3/10 pain, declines medication. Lap sites CDI. Packing in place. Vaughn patent and draining. Tolerating clears. Declined capno. Monitoring on continuous pulse ox.

## 2019-01-24 NOTE — PROGRESS NOTES
"Gyn Post-operative Note POD# 1    S:  Patient doing well.  Pain well controlled on oral pain medication, Tylenol and Ibuprofen.  Ambulating.  Tolerating regular diet. Patient has passed voiding trial. She is ready for discharge.      O:   /52 (BP Location: Right arm)   Pulse 63   Temp 98.3  F (36.8  C) (Oral)   Resp 16   Ht 1.689 m (5' 6.5\")   Wt 83.3 kg (183 lb 11.2 oz)   SpO2 94%   BMI 29.21 kg/m     I/O this shift:  In: 360 [P.O.:360]  Out: 150 [Urine:150]  Gen- A&O, NAD  Abd- soft, non-tender, non-distended, no guarding or rebound  Incision(s)- C/D/I,  Laparoscopic incisions are all intact, healing well  Ext- Non-tender, no edema    Labs:    Hemoglobin   Date Value Ref Range Status   01/24/2019 11.1 (L) 11.7 - 15.7 g/dL Final   ]     Pathology is pending    A/P:  63 year old POD# 1 s/p daVinci supracervical hysterectomy, bilateral salpingo-oophorectomy and rectocele repair in conjunction with a daVinci sacrocolpopexy, lysis of adhesions, mid urethral sling and cystoscopy.  No post operative complications and the patient has voided with a low residual volume.  She is ready for discharge.     1.  Discharge today  2.  Patient to use OTC meds, Tylenol and Ibuprofen  3.  Lifting restrictions reinforced.   4.  The plan of care was discussed with the patient.  She expressed understanding and agreement.  5.   Discharge today  6.  RTC in 6 weeks for a post operative exam.    7.  All questions were answered.       Santos Conklin  1/24/2019  12:23 PM   "

## 2019-01-24 NOTE — CONSULTS
Consult Date:  01/23/2019      MEDICINE CONSULTATION NOTE      PRIMARY CARE PHYSICIAN:  Providers Dr. Boyer.      REQUESTING PROVIDER:  Physician assistant Laura Jack.      REASON FOR CONSULTATION:  Diabetes management.      HISTORY OF PRESENT ILLNESS:  Jenna Cadr is a 63-year-old female with a past medical history significant for obstructive sleep apnea, type 2 diabetes, hypertension, hyperlipidemia, coronary artery disease, based off a CT calcium scoring, gout, hypothyroidism and celiac disease who underwent a supracervical hysterectomy, rectocele repair, bilateral salpingo-oophorectomy, sacrocolpopexy, and mid urethral sling for which the hospitalist service has been consulted to assist with diabetes management.      The patient underwent this elective surgery due to a pelvic prolapse and urinary incontinence.  It was performed under general endotracheal anesthesia with an estimated blood loss of 20 mL and no complications.      I evaluated the patient in her hospital room with her son present in the room.  The patient indicated that she has been experiencing lightheadedness upon standing and some numbness and tingling in her right hand, thought to be secondary to carpal tunnel syndrome.  Otherwise, she offers no complaints.      PAST MEDICAL HISTORY:   1.  Obstructive sleep apnea for which the patient has a CPAP but states she is having difficulty with it.   2.  Type 2 diabetes with an A1c noted to be 7.   3.  Hypertension.   4.  Hyperlipidemia.   5.  Coronary artery disease based off of CT calcium scoring.   6.  Gout.   7.  Hypothyroidism.   8.  Celiac disease.      PAST SURGICAL HISTORY:   1.  Left knee arthroscopy.   2.  D and C.   3.  Tonsillectomy.   4.  Colon biopsies.      PRIOR TO ADMISSION MEDICATIONS:    Prior to Admission Medications   Prescriptions Last Dose Informant Patient Reported? Taking?   Omega-3 Fatty Acids (OMEGA-3 FISH OIL) 1000 MG CAPS 1/8/2019 Self No No   Sig: Take 1 capsule (1 g)  by mouth daily   Patient taking differently: Take 2 g by mouth daily (Patient taking [id number])   acetaminophen (TYLENOL) 500 MG tablet 1/15/2019 at prn Self Yes Yes   Sig: Take 500-1,000 mg by mouth as needed for mild pain   albuterol (PROAIR HFA/PROVENTIL HFA/VENTOLIN HFA) 108 (90 Base) MCG/ACT inhaler 12/2/2018 at prn Self No Yes   Sig: Inhale 2 puffs into the lungs every 6 hours as needed for shortness of breath / dyspnea or wheezing   allopurinol (ZYLOPRIM) 300 MG tablet 1/22/2019 at 0900 Self No Yes   Sig: TAKE ONE TABLET BY MOUTH ONE TIME DAILY    amLODIPine (NORVASC) 5 MG tablet 1/23/2019 at 0700 Self No Yes   Sig: Take 1 tablet (5 mg) by mouth daily   aspirin 81 MG EC tablet 1/8/2019 at 0800 Self Yes Yes   Sig: Take 81 mg by mouth At Bedtime   atenolol (TENORMIN) 50 MG tablet 1/23/2019 at 0700 Self No Yes   Sig: TAKE TWO TABLETS BY MOUTH DAILY    atorvastatin (LIPITOR) 20 MG tablet 1/22/2019 at 2230 Self No Yes   Sig: Take 1 tablet (20 mg) by mouth daily   blood glucose monitoring (ACCU-CHEK MARSHALL PLUS) meter device kit  Self Yes No   blood glucose monitoring (ACCU-CHEK MARSHALL PLUS) test strip  Self No No   Sig: Check blood sugars once daily   blood glucose monitoring (SOFTCLIX) lancets  Self No No   Sig: Use to test blood sugar 1 times daily or as directed.   calcium carbonate (TUMS) 500 MG chewable tablet 1/18/2019 at prn Self Yes Yes   Sig: Take 1-2 chew tab by mouth as needed for heartburn   hydrochlorothiazide (HYDRODIURIL) 25 MG tablet 1/22/2019 at 0900 Self No Yes   Sig: TAKE ONE TABLET BY MOUTH IN THE MORNING    ibuprofen (ADVIL/MOTRIN) 200 MG tablet 1/8/2019 at prn Self Yes Yes   Sig: Take 200-800 mg by mouth every 4 hours as needed for mild pain   levothyroxine (SYNTHROID/LEVOTHROID) 75 MCG tablet 1/23/2019 at 0700 Self No Yes   Sig: Take 1 tablet (75 mcg) by mouth daily   losartan (COZAAR) 100 MG tablet 1/22/2019 at 2100 Self No Yes   Sig: TAKE HALF TABLET BY MOUTH TWICE DAILY    metFORMIN  (GLUCOPHAGE) 500 MG tablet 1/22/2019 at 2100 Self No Yes   Sig: TAKE ONE TABLET BY MOUTH TWICE DAILY WITH MEALS.      Facility-Administered Medications: None          ALLERGIES:    Allergies   Allergen Reactions     Dapsone Hives     Sulfa Drugs Hives     Allergy to Dapsone- wants to stay away from all sulfa drugs        SOCIAL HISTORY:  The patient currently resides in a house in [place], [place] with her son.  She is a lifelong nonsmoker.  She indicates infrequent alcohol consumption, maybe once a week at most.  She denies street or illicit drug use and does not currently utilize a cane or a walker.      FAMILY HISTORY:   1.  Mother passed away, had pulmonary fibrosis.   2.  Father passed away, had cardiovascular disease with hypertension and hyperlipidemia.      REVIEW OF SYSTEMS:  A 10 point review of systems was performed.  All pertinent positives are listed in the history of present illness, otherwise is negative.      PHYSICAL EXAMINATION:   VITAL SIGNS:  Temperature is 97.9 degrees Fahrenheit with a blood pressure of 117/56, heart rate of 63 beats per minute, respiratory rate of 16, O2 saturation 90% on room air.  The patient is denying any pain.   GENERAL:  The patient is awake, alert and cooperative, in no apparent distress, alert and oriented x 3.   HEENT:  Normocephalic, atraumatic.  Moist mucous membranes present.  No exudates noted in the posterior pharynx.  Uvula is midline.  Eyes:  Pupils are equal, round, reactive to light.  Extraocular movements are intact.  Normal sclerae.   NECK:  Supple, normal range of motion, no tracheal deviation, no cervical lymphadenopathy present.   CARDIOVASCULAR:  Regular rate and rhythm, no rubs, murmurs or gallops appreciated.   PULMONARY:  Lungs are clear to auscultation bilaterally, no wheezes, rhonchi is or rales appreciated.   GASTROINTESTINAL:  Bowel sounds are present in all 4 quadrants, soft, nontender, nondistended.   NEUROLOGIC:  Cranial nerves II-XII  are grossly intact.  Patient demonstrates equal sensation, coordination and strength in the upper and lower extremities bilaterally.   EXTREMITIES:  No lower extremity edema noted bilaterally.  Calves are nontender to palpation and PCDS are in place bilaterally.   GENITOURINARY:  Vaughn catheter is in place with blood-tinged urine present in the bag.      ASSESSMENT AND PLAN:  Jenna Card a 63-year-old female with a past medical history significant for obstructive sleep apnea, type 2 diabetes, hypertension, hyperlipidemia, coronary artery disease based off of CT calcium scoring, gout, hypothyroidism and celiac disease who underwent a supracervical hysterectomy with bilateral salpingo-oophorectomy, mid-urethral sling placement and sacrocolpopexy for which the hospitalist service has been consulted to assist with diabetes management.   1.  Urinary incontinence with uterine apical prolapse and cystocele, status post supracervical hysterectomy, bilateral salpingo-oophorectomy, sacrocolpopexy, mid-urethral sling, rectocele repair:  OB/GYN and urology service will be managing at this point.  We will defer analgesic management, DVT prophylaxis and PT and OT assessment to their service.  We will order for hemoglobin to be checked in the morning to assess for acute blood loss anemia.  Would strongly encourage utilization of incentive spirometer.   2.  Type 2 diabetes:  Patient's recent A1c was noted to be 7.  She is compliant with metformin 500 mg 2 times daily.  We will hold this medication.  We will order for medium insulin sliding scale with Accu-Cheks performed 4 times daily.  Of note, patient received 4 mg of dexamethasone intraoperatively.   3.  Obstructive sleep apnea:  Patient is compliant with her home CPAP, though states it is difficult to maintain throughout the evening.  I will ensure home CPAP with home settings is ordered for this evening.   4.  Essential hypertension:  Patient's blood pressures appear to be  stable at notably 117/56.  I will plan to resume amlodipine 5 mg daily, atenolol 50 mg daily, and losartan 50 mg 2 times daily with hold parameters in place.  We will hold prior to admit hydrochlorothiazide as patient is receiving IV fluids.   5.  Hyperlipidemia:  Patient's prior to admit Lipitor 20 mg daily will be continued.   6.  Gout:  We will resume prior to admit allopurinol 300 mg daily.   7.  Hypothyroidism:  The patient's prior to admit levothyroxine 75 mcg daily will be held and it can be resumed at time of discharge.   8.  Coronary artery disease:  This is based off of CT calcium scoring.  The patient has been seen by cardiology service and undergoing lifestyle modifications and blood pressure and cholesterol control with medical management as stated above.   9.  DVT prophylaxis:  Per urology and OB service, patient has been started on subq Lovenox and PCDS.      CODE STATUS:  Discussed with the patient, she elected to be full code.      DISPOSITION:  Ultimately up to OB/GYN and urology service.      The patient was discussed with Dr. Camacho who agrees with the assessment and plan as stated above.  Dr. Camacho will evaluate the patient independently.      At this time, I would like to thank physician assistant Laura Kelly for consulting the hospitalist service.  We will continue to follow.         SURJIT CAMACHO MD       As dictated by ERIC ROLLE PA-C            D: 2019   T: 2019   MT: LORELEI      Name:     SAUL BULLARD   MRN:      [id number]        Account:       [id number]   :      1955           Consult Date:  2019      Document: [id number]       cc: Wilmar KELLY PA-C

## 2019-01-24 NOTE — PLAN OF CARE
Pt a/ox4. VS stable. CMS intact. C/o some abdominal discomfort, prn norco and scheduled Toradol given. Blood glucose 142. Vaughn and vaginal packing removed. Moderate drainage. Pt reports passing gas.

## 2019-01-24 NOTE — PROGRESS NOTES
[facility] [place] [facility]    Urology Progress Note     Assessment & Plan   Jenna Card is a 63 year old female who was admitted on 1/23/2019. POD #1 s/p hysterectomy, sacrocolpopexy, mid urethral sling, rectocele repair-- doing well    Plan:   -FLD for lunch   -Ambulate   -Vaughn removed this AM, awaiting void     Discussed prescriptions, pain control, diet and activity for discharge   Plan for discharge home this afternoon once voiding   Follow-up with Dr. Lo in 1 month     Bel Bright PA-C  [facility]  [email or web address]  Text Page (7am to 4:30pm)    Interval History   Feeling well this AM, no events overnight   Vaughn and vag packing now removed   Pain minimal and well controlled   Tolerating clears, +flatus    Has not yet ambulated     Denies fevers,chills, sob, calf tenderness   Dose of lovenox this AM     AVSS  Creat 0.82  Hb 11.1    Physical Exam   Temp: 98.3  F (36.8  C) Temp src: Oral BP: 111/52 Pulse: 63 Heart Rate: 56 Resp: 16 SpO2: 94 % O2 Device: None (Room air) Oxygen Delivery: 1 LPM  Vitals:    01/23/19 1105   Weight: 83.3 kg (183 lb 11.2 oz)     Vital Signs with Ranges  Temp:  [95.1  F (35.1  C)-98.4  F (36.9  C)] 98.3  F (36.8  C)  Pulse:  [56-71] 63  Heart Rate:  [56-72] 56  Resp:  [11-18] 16  BP: (105-167)/(44-94) 111/52  SpO2:  [90 %-98 %] 94 %  I/O last 3 completed shifts:  In: 1988 [P.O.:440; I.V.:1548]  Out: 1500 [Urine:1500]    Constitutional: Sitting up in bed, NAD  Respiratory: breathing unlabored   Cardiovascular: chest wall symmetric   GI: soft, NT, ND; incisions cdi and carlos  Lymph/Hematologic: no pedal edema or calve tenderness   Skin: well perfused   Musculoskeletal: moves all extremities   Neurologic: no focal deficits   Neuropsychiatric: A&Ox3    Medications     sodium chloride 100 mL/hr at 01/24/19 0657       allopurinol  300 mg Oral Daily     amLODIPine  5 mg Oral Daily     atenolol  100 mg Oral Daily     atorvastatin   20 mg Oral At Bedtime     ceFAZolin  1 g Intravenous Q8H     ciprofloxacin  400 mg Intravenous Q12H     insulin aspart  1-3 Units Subcutaneous TID AC     insulin aspart  1-3 Units Subcutaneous At Bedtime     ketorolac  15 mg Intravenous Q6H     levothyroxine  75 mcg Oral Daily     losartan  50 mg Oral BID     polyethylene glycol  17 g Oral Daily     senna-docusate  2 tablet Oral QAM     sodium chloride (PF)  3 mL Intracatheter Q8H       Data   Results for orders placed or performed during the hospital encounter of 01/23/19 (from the past 24 hour(s))   ABO/Rh type and screen   Result Value Ref Range    ABO AB     RH(D) Pos     Antibody Screen Neg     Test Valid Only At [facility]        Specimen Expires 01/26/2019    Potassium   Result Value Ref Range    Potassium 3.8 3.4 - 5.3 mmol/L   Creatinine   Result Value Ref Range    Creatinine 0.76 0.52 - 1.04 mg/dL    GFR Estimate 82 >60 mL/min/[1.73_m2]    GFR Estimate If Black >90 >60 mL/min/[1.73_m2]   Glucose by meter   Result Value Ref Range    Glucose 139 (H) 70 - 99 mg/dL   Glucose by meter   Result Value Ref Range    Glucose 170 (H) 70 - 99 mg/dL   Glucose by meter   Result Value Ref Range    Glucose 213 (H) 70 - 99 mg/dL   Glucose by meter   Result Value Ref Range    Glucose 142 (H) 70 - 99 mg/dL   Hemoglobin   Result Value Ref Range    Hemoglobin 11.1 (L) 11.7 - 15.7 g/dL   Basic metabolic panel   Result Value Ref Range    Sodium 133 133 - 144 mmol/L    Potassium 4.3 3.4 - 5.3 mmol/L    Chloride 101 94 - 109 mmol/L    Carbon Dioxide 24 20 - 32 mmol/L    Anion Gap 8 3 - 14 mmol/L    Glucose 135 (H) 70 - 99 mg/dL    Urea Nitrogen 18 7 - 30 mg/dL    Creatinine 0.82 0.52 - 1.04 mg/dL    GFR Estimate 75 >60 mL/min/[1.73_m2]    GFR Estimate If Black 87 >60 mL/min/[1.73_m2]    Calcium 8.3 (L) 8.5 - 10.1 mg/dL   Glucose by meter   Result Value Ref Range    Glucose 130 (H) 70 - 99 mg/dL

## 2019-01-24 NOTE — DISCHARGE INSTRUCTIONS
INSULIN INSTRUCTIONS  Correction Scale - LOW INSULIN RESISTANCE DOSING     Do Not give Correction Insulin if Pre-Meal BG less than 140.   For Pre-Meal  - 239 give 1 unit.   For Pre-Meal  - 339 give 2 units.   For Pre-Meal BG greater than or equal to 340 give 3 units.       Same Day Surgery Discharge Instructions for  Sedation and General Anesthesia       It's not unusual to feel dizzy, light-headed or faint for up to 24 hours after surgery or while taking pain medication.  If you have these symptoms: sit for a few minutes before standing and have someone assist you when you get up to walk or use the bathroom.      You should rest and relax for the next 24 hours. We recommend you make arrangements to have an adult stay with you for at least 24 hours after your discharge.  Avoid hazardous and strenuous activity.      DO NOT DRIVE any vehicle or operate mechanical equipment for 24 hours following the end of your surgery.  Even though you may feel normal, your reactions may be affected by the medication you have received.      Do not drink alcoholic beverages for 24 hours following surgery.       Slowly progress to your regular diet as you feel able. It's not unusual to feel nauseated and/or vomit after receiving anesthesia.  If you develop these symptoms, drink clear liquids (apple juice, ginger ale, broth, 7-up, etc. ) until you feel better.  If your nausea and vomiting persists for 24 hours, please notify your surgeon.        All narcotic pain medications, along with inactivity and anesthesia, can cause constipation. Drinking plenty of liquids and increasing fiber intake will help.      For any questions of a medical nature, call your surgeon.      Do not make important decisions for 24 hours.      If you had general anesthesia, you may have a sore throat for a couple of days related to the breathing tube used during surgery.  You may use Cepacol lozenges to help with this discomfort.  If it worsens or if  you develop a fever, contact your surgeon.       If you feel your pain is not well managed with the pain medications prescribed by your surgeon, please contact your surgeon's office to let them know so they can address your concerns.       **If you have questions or concerns about your procedure,   call Dr Conklin at [phone]**    Discharge Instructions for Laparoscopic or Davinci Hysterectomy    Incisional Care  A small amount of drainage from your incisions is normal. You may wear Band Aids until the drainage stops. The day after surgery, if your incisions are dry, remove the Band Aids. Leave the Steri-Strips (small pieces of tape) in place. Let them fall off on their own, or gently remove them after 7 days.  Once your have removed your Band Aids, you may shower as usual. Wash your incisions with mild soap and water. Pat dry.  Don't use oils, powders, or lotions on your incisions.  General Care  Take your medications exactly as directed by your doctor.    You may have vaginal bleeding that can last for several weeks. Use pads only. Don't put anything in your vagina (tampons, douches or have sexual intercourse) until your doctor says it's safe to do so.  Avoid constipation.  Eat fruits, vegetables, and whole grains.  Drink 6-8 glasses of water a day, unless told to do otherwise.  Use a laxative or a mild stool softener if your doctor says it's okay.  Activity  Don't drive while you are still taking narcotic pain medications.  Don t do strenuous activities until the doctor says it's okay.  Walk as often as you feel able.    Pain  Your incisions may be tender or sore. You may also have pain in your upper back or shoulders. This is from the gas used to enlarge your abdomen to allow your doctor to see inside your pelvis and perform the procedure. This pain usually goes away in a day or two.   When to Call Your Doctor  Call your doctor right away if you have any of the following:  Fever above 100.4 F (38 C) or  chills  Vaginal bleeding that soaks more than one sanitary pad per hour  A foul smelling discharge from the vagina  Difficulty urinating  Severe pain   Redness, swelling, or drainage at your incision sites  Follow-Up  Make a follow-up appointment as directed by your surgeon.

## 2019-01-24 NOTE — PLAN OF CARE
Pt did well in recovery room.  VSS. Meets discharge criteria.  Report called to Faiht DURAND on the observation unit.  Pt transported via cart with 02 at 3 Liters via nasal cannula with Nursing Assistant escort.  Prescription take home medications sent up with NA to hand off to secure with RN on the observation unit.   Son Ian accompanied patient up to her room.

## 2019-01-24 NOTE — ANESTHESIA POSTPROCEDURE EVALUATION
Patient: Jenna Card    Procedure(s):  ROBOTIC ASSISTED XI SUPRACERVICAL HYSTERECTOMY, Rectocele repair  DAVINCI XI BILATERAL SALPINGO-OOPHORECTOMY  DAVINCI XI SACROCOLPOPEXY, MIDURETHRAL SLING, CYSTOSCOPY  Davinci Lysis of Adhesions    Diagnosis:GENITAL PROLAPSE   Diagnosis Additional Information: No value filed.    Anesthesia Type:  General, ETT    Note:  Anesthesia Post Evaluation    Patient location during evaluation: PACU  Patient participation: Able to fully participate in evaluation  Level of consciousness: awake and alert  Pain management: adequate  Airway patency: patent  Cardiovascular status: acceptable  Respiratory status: acceptable and unassisted  Hydration status: acceptable  PONV: none             Last vitals:  Vitals:    01/23/19 1755 01/23/19 1800 01/23/19 1815   BP:  131/64 130/63   Pulse:  60 68   Resp:  13 11   Temp:  36.7  C (98.1  F) 36.6  C (97.9  F)   SpO2: 96% 93% 95%         Electronically Signed By: Gio Muñoz MD  January 23, 2019  6:25 PM

## 2019-01-24 NOTE — PROGRESS NOTES
[facility]    Hospitalist Progress Note      Assessment & Plan   eJnna Card is a 63 year old female  with a past medical history significant for obstructive sleep apnea, type 2 diabetes, hypertension, hyperlipidemia, coronary artery disease, based off a CT calcium scoring, gout, hypothyroidism and celiac disease who is POD 1 s/p supracervical hysterectomy, rectocele repair, bilateral salpingo-oophorectomy, sacrocolpopexy, and mid urethral sling for which the hospitalist service has been consulted to assist with diabetes management.     Urinary incontinence with uterine apical prolapse and cystocele, status post supracervical hysterectomy, bilateral salpingo-oophorectomy, sacrocolpopexy, mid-urethral sling, rectocele repair.  Performed under general anesthesia with an EBL of 20cc.   -- defer primary management to OB/GYN and urology service   We will defer analgesic management, DVT prophylaxis and PT and OT assessment to their service.   --encourage IS  --NS @ 100/hr, this should be discontinued when patient is tolerating po intake     Acute blood loss anemia. Hgb 13.6>11.1. Asymptomatic    Type 2 diabetes:  Patient's recent A1c was noted to be 7.  She is compliant with metformin 500 mg 2 times daily.    Of note, patient received 4 mg of dexamethasone intraoperatively.   --hold PTA metformin  --per Surgery request patient will be sent on 2 days of low intensity sliding scale and will resume her metformin when she resumes regular diet at that time. She will check sugars tid with meals and at bedtime and will only correct mealtime sugars to avoid overnight hypoglycemia. Discussed symptoms of hypoglycemia. She already has testing supplies at home and feels comfortable with this plan.     Obstructive sleep apnea:  Patient is compliant with her home CPAP, though states it is difficult to maintain throughout the evening. Will continue CPAP per home settings    Essential hypertension:  --continue  PTA  amlodipine 5 mg daily, atenolol 50 mg daily, and losartan 50 mg 2 times daily   --hold hydrochlorothiazide for now. Advised her to check blood pressures once daily at home and resume if SBP >140, otherwise restart in two days when she resumes regular diet.      Hyperlipidemia: continue  prior to admit Lipitor 20 mg     Gout:  continue prior to admit allopurinol 300 mg daily.     Hypothyroidism: Resume prior to admit levothyroxine 75 mcg daily at discharge     Coronary artery disease:  This is based off of CT calcium scoring.  The patient has been seen by cardiology service and undergoing lifestyle modifications and blood pressure and cholesterol control with medical management as stated above.     DVT Prophylaxis: Defer to primary service  Code Status: No Order    Disposition: Expected discharge later today. Ok with medicine     This patient was seen and examined with Dr. Guillen who agrees with the above plan.    Kia Davis PA-C    Interval History   Feeling well this morning. Pain controlled with Norco. Tolerating clears. No nausea or vomiting. Passing gas. Due to void. Denies chest pain, shortness of breath, dizziness. Comfortable with plan to discharge with insulin per surgery request.     -Data reviewed today: I reviewed all new labs and imaging results over the last 24 hours. I personally reviewed no images or EKG's today.    Physical Exam   Temp: 97.9  F (36.6  C) Temp src: Oral BP: 129/64 Pulse: 63 Heart Rate: 65 Resp: 16 SpO2: 95 % O2 Device: None (Room air) Oxygen Delivery: 1 LPM  Vitals:    01/23/19 1105   Weight: 83.3 kg (183 lb 11.2 oz)     Vital Signs with Ranges  Temp:  [95.1  F (35.1  C)-98.4  F (36.9  C)] 97.9  F (36.6  C)  Pulse:  [56-71] 63  Heart Rate:  [60-72] 65  Resp:  [11-18] 16  BP: (105-167)/(44-94) 129/64  SpO2:  [90 %-98 %] 95 %  I/O last 3 completed shifts:  In: 1988 [P.O.:440; I.V.:1548]  Out: 1500 [Urine:1500]    Constitutional: Alert and oriented, sitting up in bed.  Appears comfortable and is pleasantly conversant   ENT: normal cephalic, moist mucous membranes  Respiratory: Lungs clear to auscultation bilaterally, no increased work of breathing or wheezing  Cardiovascular: Regular rate and rhythm, no murmur, no significant LE edema, distal pulses +2/4  GI: positive bowel sounds, abdomen is soft. Incisions with dermabond. C/d/i     Minimal tenderness  Skin/Integumen: warm, dry  MSK:  Moves all four extremities. Normal tone  Neuro:  Cranial nerves 2-12 grossly intact. No focal deficits       Medications     sodium chloride 100 mL/hr at 01/24/19 0657       allopurinol  300 mg Oral Daily     amLODIPine  5 mg Oral Daily     atenolol  100 mg Oral Daily     atorvastatin  20 mg Oral At Bedtime     ceFAZolin  1 g Intravenous Q8H     ciprofloxacin  400 mg Intravenous Q12H     enoxaparin  40 mg Subcutaneous Q12H     insulin aspart  1-7 Units Subcutaneous TID AC     insulin aspart  1-5 Units Subcutaneous At Bedtime     ketorolac  15 mg Intravenous Q6H     levothyroxine  75 mcg Oral Daily     losartan  50 mg Oral BID     polyethylene glycol  17 g Oral Daily     senna-docusate  2 tablet Oral QAM     sodium chloride (PF)  3 mL Intracatheter Q8H       Data   Recent Labs   Lab 01/24/19  0626 01/23/19  1115 01/17/19  1137   WBC  --   --  9.5   HGB 11.1*  --  13.6   MCV  --   --  89   PLT  --   --  324     --   --    POTASSIUM 4.3 3.8  --    CHLORIDE 101  --   --    CO2 24  --   --    BUN 18  --   --    CR 0.82 0.76  --    ANIONGAP 8  --   --    LEONOR 8.3*  --   --    *  --   --        No results found for this or any previous visit (from the past 24 hour(s)).

## 2019-01-24 NOTE — PLAN OF CARE
A&Ox4. Up SBA. Tolerating full liquid diet, denies nausea.  & 128. VSS on RA. C/o 2/10 abd pain, given scheduled toradol and tylenol prn. 5 lap sites with dermabond, C/D/I. Up to BR voiding with minimal PVR. Vaginal spotting present. IS at bedside and encouraged.     Pt given discharge instructions. Questions answered. Discharge medications given and reviewed with patient. Follow up appointment scheduled by pt. Pt to [place] home with friend, Jessica.

## 2019-01-25 DIAGNOSIS — I10 ESSENTIAL HYPERTENSION: ICD-10-CM

## 2019-01-25 LAB — COPATH REPORT: NORMAL

## 2019-01-25 NOTE — OP NOTE
Procedure Date: 2019      PREOPERATIVE DIAGNOSES:  Symptomatic pelvic organ prolapse with urinary incontinence.      POSTOPERATIVE DIAGNOSES:  Symptomatic pelvic organ prolapse with urinary incontinence with intraabdominal adhesions.      PROCEDURES PERFORMED:     1.  By Santos Conklin, examination under anesthesia, da Suma supracervical hysterectomy, bilateral salpingo-oophorectomy, rectocele repair.     2.  By Dr. Cr Lo, da Suma sacrocolpopexy, lysis of adhesions, mid urethral sling procedure and cystoscopy.      ANESTHESIA:  General.      BLOOD LOSS:  20 mL.      PREOPERATIVE STATUS:  Jenna Card is a 62-year-old woman,  1, para 1, who is to undergo the above-named procedure for symptomatic pelvic organ prolapse.  The patient has had symptoms for several years, initially the onset of stress urinary incontinence, progressing to a vaginal bulge.  The patient had been seen by my associate, Dr. Heidi Mcnulty and Dr. Mcnulty referred the patient to myself for evaluation of pelvic organ prolapse.  The patient states that the vaginal bulge is uncomfortable, resulting in pressure.  She also experiences urinary incontinence and wears a panty liner daily.  She has no issues with defecation, nor did she have fecal incontinence.  The patient was seen and evaluated by myself on 2018.  The options of management were discussed including observation, the use of a pessary and surgical repair.  The patient expressed a strong interest in pursuing surgical repair.  She was seen by Dr. Cr Lo who evaluated the patient and agreed that she would be an excellent candidate for a minimally invasive da Suma prolapse surgical repair.  The patient was informed regarding the procedure, pros, cons, risks, benefits and potential complications.  She was aware that the procedure would involve mesh material.  The patient was informed regarding FDA warnings regarding this product and potential issues  with transvaginal and surgical mesh.  She did have a history and physical performed by her primary care provider and she was felt to be a satisfactory candidate for the surgery scheduled.  The patient does have coronary artery disease and adult onset diabetes, both of which are managed by her primary care provider.  She also takes medicine for hypertension.  The morning of surgery, these issues were again discussed with the patient including the pros, cons, risks, benefits and potential complications.  Appropriate consent forms were signed.  The patient was given ample time to ask her questions.  Following this, we proceeded with the surgical procedure.      OPERATIVE FINDINGS:  At the time of examination under anesthesia, the external genitalia were normal.  The vaginal mucosa was normal.  There was a grade 3 cystocele and grade 3 uterine and apical prolapse present.  With traction, the uterine cervix descended to an extracorporeal state.  There was a grade 1-2 rectocele.  At the time of da Suma laparoscopy, the pelvis was unremarkable.  The uterus contour was normal.  The ovaries and tubes were normal in appearance.  There were no pelvic adhesions.  There were adhesions of the small bowel to the sigmoid colon epiploica.  The etiology of these adhesions was unclear; however, lysis of adhesions by Dr. Lo was required to access the presacral space for the sacrocolpopexy.  At the conclusion of the procedure, the anatomic result was excellent.  Following the sacrocolpopexy and mid urethral sling, there remained a grade 1 rectocele.  This was repaired in the usual fashion with a posterior colporrhaphy.  There were no apparent complications of the procedure, the patient had no anesthetic complications and the blood loss was minimal at 20 mL.  Surgical specimens included the uterus, tubes and ovaries as well as vaginal mucosa.      DESCRIPTION OF PROCEDURE:  Jenna Card was taken to the operating room at  [facility] and placed in the supine position.  Both intravenous and inhalation anesthesia were administered and endotracheal intubation was performed.  The patient was then placed in the dorsal lithotomy position.  Examination under anesthesia was performed with the findings documented above.  The vagina, perineum and abdomen were then prepped and draped in the usual fashion for combined vaginal and abdominal surgery.  Once the patient was completely prepped and draped, all instruments were readied.  A debriefing was then held, at which time all members of the operating room staff acknowledged the patient's identity, medical record number, date of birth and surgery to be performed.  From a gynecologic perspective, this was a da Suma supracervical hysterectomy, bilateral salpingo-oophorectomy and possible rectocele repair.  The patient had received 2 grams of Ancef.  Once the timeout was concluded, the surgery began.  I began the surgery vaginally by placing a Vaughn catheter under sterile conditions.  There was Pyridium stained urine present in normal volumes.  A speculum was then placed.  The cervix was visualized.  The cervix was grasped using a tenaculum.  The cervical canal was then dilated to a size #6 Hegar dilator.  A Hulka tenaculum was then placed, the cannula of which was covered with the tip of a red Lewis catheter.  The Hulka tenaculum was attached to the anterior cervical lip.  The initial tenaculum was removed.  This Hulka tenaculum would serve as a mechanism of manipulation of the uterus as well as identification of the endocervical canal during the supracervical hysterectomy.        Meanwhile, Dr. Lo and her assistant were placing the abdominal ports.  There were 5 abdominal incisions.  In the right lateral abdomen was an 8 mm da Suma port as well as a 5 mm AirSeal port.  There was an 8 mm da Suma port supraumbilically for the da Suma scope.  There were two 8 mm da Suma  ports in the left lateral abdomen.  The pneumoperitoneum had been created by Dr. Lo without incident.  The ports were placed under direct visualization and also without incident.  Once the ports were placed, the da Suma Xi was brought into the surgical field and the robot was docked.  It should be mentioned that the surgical table was brought to its lowest most point and the patient was placed in steep Trendelenburg prior to docking the robot.  The instruments were then placed through the operating ports.  Through arm #1 was a Cadiere device.  Arm #2 was long bipolar forceps.  Arm #3 was a da Suma scope.  Arm #4 was a da Suma monopolar scissors.  The instruments were placed under direct visualization.  Complete and thorough examination of the abdomen and pelvis was then performed with the findings documented above.  The course of both ureters was identified.  The surgery then began by cauterizing and cutting the infundibulopelvic ligaments on both sides.  The dissection was carried bilaterally along the mesosalpinx to the round ligaments also on both sides.  The round ligaments were cauterized and cut.  The anterior leaf of the broad ligament was incised to a level of the cervix and upper vagina.  The bladder was dissected away from the cervix and upper vagina.  Again, the course of the ureters was identified.  The hysterectomy was continued by cauterizing along the uterus incorporating the parametria and the uterine vascular pedicles on both sides.  The dissection was carried down to below the internal cervical os.  The uterosacral ligaments were spared.  Amputation of the uterine specimen was then performed by cutting across the cervix just below the level of the internal os with the monopolar scissors.  The specimen was completely amputated.  The Hulka tenaculum was removed and replaced with a stick sponge.  The endocervical canal was cauterized using the bipolar cautery.  The cervical stump and all  pedicles were confirmed to be hemostatic.  The uterine specimen with the tubes and ovaries attached was then placed into an Endo Catch bag.  The bag was left in the abdomen to be removed at the conclusion of the procedure.        At this time, Dr. Lo took her place at the Vendscreen console and took control of the instruments.  Dr. Lo then performed a da Suma sacrocolpopexy.  The presacral space was obscured by bowel adhesions.  She performed lysis of adhesions, freeing the small bowel from the epiploica of the sigmoid colon and allowing access to the presacral space.  The sacrocolpopexy was then performed without incident.  I then rescrubbed and entered the operating room at the patient's bedside.  The da Suma robot was undocked.  All instruments had been removed under direct visualization.  The string of the Endo Catch bag had been brought out through the supraumbilical port.  The uterine specimen was then brought out through the supraumbilical incision after it had been slightly extended.  The remaining ports were removed and there was complete hemostasis.  The pneumoperitoneum was allowed to escape.  In the meantime, Dr. Lo had performed a mid-urethral sling procedure.  She also performed cystoscopy and the findings were normal.  Dr. Lo then closed the abdominal incisions with her assistant while I examined the patient and determined whether a posterior repair was necessary.  There remained a grade 1 to nearly grade 2 rectocele after the sacrocolpopexy.  This was a distal rectocele.  A rectocele repair was performed in the usual fashion.  Allis clamps were placed at the introitus.  The posterior vaginal mucosa was injected with a dilute vasopressin solution.  An incision was made at the introitus at the 6 o'clock position.  The vaginal mucosa was incised in the linear fashion.  The extent of the dissection was the extent of the rectocele.  The vaginal tissues were then dissected away  from the rectocele defect.  Plication sutures of 2-0 Vicryl were placed, reducing the rectocele.  The excess vaginal mucosa was then trimmed.  The vaginal mucosa was closed using interrupted figure-of-X sutures of 0 Vicryl.  The procedure was then brought to a close.  The Vaughn catheter was replaced.  A vaginal packing was also placed.  The anatomic result was excellent.  The procedure was then brought to a close.        The patient was extubated, recalled from general anesthesia and brought to the recovery room having tolerated the procedure well.  A debriefing was held at which time the surgeons and staff reviewed the procedure performed, the surgical specimens, and the antibiotics that had been given.        Surgical specimens include the uterus (without the cervix), bilateral fallopian tubes and ovaries, and vaginal mucosa.  The blood loss was 20 mL.  The patient was taken to the recovery room in excellent condition.  A hospitalist consult will be obtained to help manage the patient's diabetes, high blood pressure and potential cardiac issues.  The patient will be given Lovenox 40 mg subcutaneous for DVT prophylaxis as well as the pneumatic compression devices.  We anticipate discharge within the next 24-36 hours.         ELBERT BOYKIN MD             D: 2019   T: 2019   MT: JUANA      Name:     SAUL BULLARD   MRN:      [id number]        Account:        [id number]   :      1955           Procedure Date: 2019      Document: [id number]

## 2019-01-25 NOTE — TELEPHONE ENCOUNTER
Requested Prescriptions   Pending Prescriptions Disp Refills     losartan (COZAAR) 100 MG tablet 90 tablet 1    There is no refill protocol information for this order        Last Written Prescription Date:  7/31/2018  Last Fill Quantity: 90,  # refills: 1   Last office visit: 1/17/2019 with prescribing provider:  1/17/2019   Future Office Visit:

## 2019-01-28 RX ORDER — LOSARTAN POTASSIUM 100 MG/1
TABLET ORAL
Qty: 90 TABLET | Refills: 3 | Status: SHIPPED | OUTPATIENT
Start: 2019-01-28 | End: 2019-12-05

## 2019-01-28 NOTE — TELEPHONE ENCOUNTER
"Prescription approved per [facility] Refill Protocol.    Requested Prescriptions   Pending Prescriptions Disp Refills     losartan (COZAAR) 100 MG tablet 90 tablet 1    Angiotensin-II Receptors Passed - 1/25/2019  9:09 AM       Passed - Blood pressure under 140/90 in past 12 months    BP Readings from Last 3 Encounters:   01/24/19 111/52   01/17/19 146/78   11/29/18 150/80                Passed - Recent (12 mo) or future (30 days) visit within the authorizing provider's specialty    Patient had office visit in the last 12 months or has a visit in the next 30 days with authorizing provider or within the authorizing provider's specialty.  See \"Patient Info\" tab in inbasket, or \"Choose Columns\" in Meds & Orders section of the refill encounter.             Passed - Medication is active on med list       Passed - Patient is age 18 or older       Passed - No active pregnancy on record       Passed - Normal serum creatinine on file in past 12 months    Recent Labs   Lab Test 01/24/19  0626   CR 0.82            Passed - Normal serum potassium on file in past 12 months    Recent Labs   Lab Test 01/24/19  0626   POTASSIUM 4.3                   Passed - No positive pregnancy test in past 12 months          "

## 2019-01-30 DIAGNOSIS — I10 ESSENTIAL HYPERTENSION: ICD-10-CM

## 2019-01-30 RX ORDER — HYDROCHLOROTHIAZIDE 25 MG/1
25 TABLET ORAL EVERY MORNING
Qty: 90 TABLET | Refills: 3 | Status: SHIPPED | OUTPATIENT
Start: 2019-01-30 | End: 2019-12-05

## 2019-01-30 NOTE — TELEPHONE ENCOUNTER
"Requested Prescriptions   Pending Prescriptions Disp Refills     hydrochlorothiazide (HYDRODIURIL) 25 MG tablet 90 tablet 1     Sig: TAKE ONE TABLET BY MOUTH IN THE MORNING   HOLD THIS FOR NOW PER INTERNAL MEDICINE    Diuretics (Including Combos) Protocol Passed - 1/30/2019 11:13 AM       Passed - Blood pressure under 140/90 in past 12 months    BP Readings from Last 3 Encounters:   01/24/19 111/52   01/17/19 146/78   11/29/18 150/80                Passed - Recent (12 mo) or future (30 days) visit within the authorizing provider's specialty    Patient had office visit in the last 12 months or has a visit in the next 30 days with authorizing provider or within the authorizing provider's specialty.  See \"Patient Info\" tab in inbasket, or \"Choose Columns\" in Meds & Orders section of the refill encounter.             Passed - Medication is active on med list       Passed - Patient is age 18 or older       Passed - No active pregancy on record       Passed - Normal serum creatinine on file in past 12 months    Recent Labs   Lab Test 01/24/19  0626   CR 0.82             Passed - Normal serum potassium on file in past 12 months    Recent Labs   Lab Test 01/24/19  0626   POTASSIUM 4.3                   Passed - Normal serum sodium on file in past 12 months    Recent Labs   Lab Test 01/24/19  0626                Passed - No positive pregnancy test in past 12 months        Routing refill request to provider for review/approval because:  Medication is reported/historical        "

## 2019-01-30 NOTE — TELEPHONE ENCOUNTER
Requested Prescriptions   Pending Prescriptions Disp Refills     hydrochlorothiazide (HYDRODIURIL) 25 MG tablet 90 tablet 1     Sig: TAKE ONE TABLET BY MOUTH IN THE MORNING   HOLD THIS FOR NOW PER INTERNAL MEDICINE    There is no refill protocol information for this order        Last Written Prescription Date: 1/24/2019   Last Fill Quantity: 90,  # refills: 1   Last Office Visit: 1/17/2019   Future Office Visit:

## 2019-02-14 NOTE — OP NOTE
Procedure Date: 01/23/2019      DATE OF SURGERY: 1/23/2019.      PREOPERATIVE DIAGNOSIS:  Pelvic organ prolapse, i.e., cystocele, grade 3 uterine prolapse, rectocele and stress incontinence.      POSTOPERATIVE DIAGNOSIS:  Pelvic organ prolapse, i.e., cystocele, grade 3 uterine prolapse, rectocele and stress incontinence.      PROCEDURES:  Robotically-assisted sacrocolpopexy with mid urethral sling, cystoscopy and lysis of adhesions by Dr. Lo.      FIRST ASSISTANT:  RAYMOND KELLY PA-C      ESTIMATED BLOOD LOSS:  20 mL.      COMPLICATIONS:  None.      In conjunction with a robotically-assisted supracervical hysterectomy, bilateral salpingo-oophorectomy and rectocele repair by Dr. Conklin.  First assisted by Dr. Lo.      SPECIMENS:  Uterus with no cervix, fallopian tubes and ovaries.      INDICATIONS FOR PROCEDURE:  Jenna Card is a 63-year-old female with a history of pelvic organ prolapse, cystocele, uterine prolapse and rectocele, stress incontinence who presents for elective surgery.  She understands the risks of the procedure including bleeding, infection, injury, De Chastity urge incontinence, urinary retention, mesh erosion, dyspareunia.  She received FDA warning regarding no vaginally placed meshes, she would like to proceed.      DESCRIPTION OF PROCEDURE:  Jenna was brought to the operating room, placed in the supine position.  After excellent induction of general anesthesia, her perineum was prepped and draped in the regular fashion.  A Vaughn catheter and OG tube were placed.  The midline incision above the umbilicus was made, and retroperitoneum was insufflated using a Veress needle.  An Additional three 8 mm robotic trocars were placed throughout the abdomen and the 5 mm air port seal was placed in the right abdomen.  There were some small bowel adhesion to the sigmoid colon epiploica.  The reason of the adhesion was not clear.  I performed lysis of the adhesion in order to get to the  presacral space for sacrocolpopexy.  The surgery was initiated by Dr. Conklin who proceeded with supracervical hysterectomy and bilateral salpingo-oophorectomy.  Please see his notes for that part of the procedure.        Once that part was done, like I mentioned above, I did lysis of adhesion to move the small bowel from the sacral promontory.  We actually even called Dr. Robert to come in and provide us intraoperative guidance and she agreed that there was no injury to the mesentery of the sigmoid, nor bowel.  I proceeded by identifying anterior longitudinal ligament on top of the sacral promontory.  Two 0 Prolene sutures x2 were placed through the ligaments.  Peritoneum was opened all the way to the cervix.  I positioned polypropylene type 1 mesh over anterior and posterior surfaces of the cervix and the vagina and tagged it using 2-0 Ethibond x7 anteriorly and x6 posteriorly.  We used a Restorelle Coloplast Y mesh polypropylene type mesh for the repair.        Once the apex of the vagina including cervix adhered nicely to the graft, I adjusted tension-free positioning of the graft and sutured it.  She already preplaced sutures over the sacrum.  All extra parts of the graft were removed and the graft was completely retroperitonealized by putting peritoneum on top of it using 2-0 Vicryl in a running fashion.  Dr. Conklin proceeded with extraction of the specimen through the midline incision and meanwhile, I proceeded with the mid-urethral sling placement.  Periurethral space was hydrodistended using Marcaine with epinephrine.  A 1 cm incision was made at the mid urethral space and dissected all the way to the pubic rami bilaterally.  I positioned polypropylene type 1 mesh from [employer] Halo called Obtryx kit, going through the obturator foramen using inside-out technique outside-in technique.  Once the graft was positioned, I performed cystoscopy that demonstrated no mesh, no stitch in the bladder or  urethra.  Efflux of urine was coming very nicely from both ureteral orifices.  I adjusted tension-free positioning of the graft by placing #8 Hegar dilator between the graft and the Vaughn in the urethra.  Anterior vaginal wall was closed using 2-0 Vicryl in a running fashion.  Dermabond was applied to the incisions over the area.  Vaginal packing was applied.  The Vaughn catheter was left in place.  Prior to the vaginal placement of the vaginal packing, Dr. Conklin proceeded with the rectocele repair which I assisted.      ESTIMATED BLOOD LOSS:  20 mL.      COMPLICATIONS:  None.         ARIANNA SOTELO MD             D: 2019   T: 2019   MT: HERMELINDO      Name:     SAUL BULLARD   MRN:      [id number]        Account:        [id number]   :      1955           Procedure Date: 2019      Document: [id number]       cc: Santos Conklin MD

## 2019-02-18 ENCOUNTER — TRANSFERRED RECORDS (OUTPATIENT)
Dept: HEALTH INFORMATION MANAGEMENT | Facility: CLINIC | Age: 64
End: 2019-02-18

## 2019-04-24 ENCOUNTER — HOSPITAL ENCOUNTER (OUTPATIENT)
Dept: MAMMOGRAPHY | Facility: CLINIC | Age: 64
Discharge: HOME | End: 2019-04-24
Attending: OBSTETRICS & GYNECOLOGY | Admitting: OBSTETRICS & GYNECOLOGY
Payer: COMMERCIAL

## 2019-04-24 DIAGNOSIS — Z12.31 VISIT FOR SCREENING MAMMOGRAM: ICD-10-CM

## 2019-04-24 PROCEDURE — 77063 BREAST TOMOSYNTHESIS BI: CPT

## 2019-04-30 ENCOUNTER — DOCUMENTATION ONLY (OUTPATIENT)
Dept: SLEEP MEDICINE | Facility: CLINIC | Age: 64
End: 2019-04-30

## 2019-04-30 NOTE — PROGRESS NOTES
6 month [facility]    STM Recheck Visit     Diagnostic AHI: 16  PSG    Subjective measures:   Pt stated that she tried to use it before her surgery nnut she reacted to the mask and then she was struggling with the pressure so she just stopped using.  She would like to start using it again.  She is going to schedule a mask fitting to try a new mask and then she will call back so we can put her back in [facility].    Assessment: Pt not meeting objective benchmarks for compliance  Patient failing following subjective benchmarks: pressure issues and mask discomfort   Action plan: schedule mask fit appointment and 2 week STM recheck appt scheduled  pt to follow up per provider request       Device type: Auto-CPAP  PAP settings: CPAP min 8.0 cm  H20     CPAP max 16.0 cm  H20  Objective measures: No use since 1/20/19    Objective measure goal  Compliance   Goal >70%  Leak   Goal < 24 lpm  AHI  Goal < 5  Usage  Goal >240

## 2019-05-14 ENCOUNTER — DOCUMENTATION ONLY (OUTPATIENT)
Dept: SLEEP MEDICINE | Facility: CLINIC | Age: 64
End: 2019-05-14

## 2019-05-14 NOTE — PROGRESS NOTES
[facility] Recheck Visit    Data only recheck     Assessment: Pt not meeting objective benchmarks for compliance. Pt hasn't gotten new mask yet.  Action plan: waiting for patient to return call.    Device type: Auto-CPAP  PAP settings: CPAP min 8 cm  H20     CPAP max 16 cm  H20

## 2019-06-19 DIAGNOSIS — I10 ESSENTIAL HYPERTENSION: ICD-10-CM

## 2019-06-19 DIAGNOSIS — M1A.9XX0 CHRONIC GOUT WITHOUT TOPHUS, UNSPECIFIED CAUSE, UNSPECIFIED SITE: ICD-10-CM

## 2019-06-19 RX ORDER — ALLOPURINOL 300 MG/1
1 TABLET ORAL DAILY
Qty: 90 TABLET | Refills: 3 | Status: SHIPPED | OUTPATIENT
Start: 2019-06-19 | End: 2019-12-05

## 2019-06-19 RX ORDER — ATENOLOL 50 MG/1
100 TABLET ORAL DAILY
Qty: 60 TABLET | Refills: 6 | Status: SHIPPED | OUTPATIENT
Start: 2019-06-19 | End: 2019-12-05

## 2019-06-19 NOTE — TELEPHONE ENCOUNTER
Routing refill request to provider for review/approval because:  Labs not current:  Last uric acid 2016

## 2019-06-19 NOTE — TELEPHONE ENCOUNTER
"Requested Prescriptions   Pending Prescriptions Disp Refills     allopurinol (ZYLOPRIM) 300 MG tablet 30 tablet 11     Sig: Take 1 tablet (300 mg) by mouth daily       Gout Agents Protocol Failed - 6/19/2019  9:51 AM        Failed - Has Uric Acid on file in past 12 months and value is less than 6     Recent Labs   Lab Test 03/21/16  1102   URIC 5.7     If level is 6mg/dL or greater, ok to refill one time and refer to provider.           Passed - CBC on file in past 12 months     Recent Labs   Lab Test 01/24/19  0626 01/17/19  1137   WBC  --  9.5   RBC  --  4.60   HGB 11.1* 13.6   HCT  --  41.0   PLT  --  324                 Passed - ALT on file in past 12 months     Recent Labs   Lab Test 01/15/19  1107   ALT 43             Passed - Recent (12 mo) or future (30 days) visit within the authorizing provider's specialty     Patient had office visit in the last 12 months or has a visit in the next 30 days with authorizing provider or within the authorizing provider's specialty.  See \"Patient Info\" tab in inbasket, or \"Choose Columns\" in Meds & Orders section of the refill encounter.              Passed - Medication is active on med list        Passed - Patient is age 18 or older        Passed - No active pregnancy on record        Passed - Normal serum creatinine on file in the past 12 months     Recent Labs   Lab Test 01/24/19  0626   CR 0.82             Passed - No positive pregnancy test in past year        atenolol (TENORMIN) 50 MG tablet 60 tablet 11     Sig: Take 2 tablets (100 mg) by mouth daily       Beta-Blockers Protocol Passed - 6/19/2019  9:51 AM        Passed - Blood pressure under 140/90 in past 12 months     BP Readings from Last 3 Encounters:   01/24/19 111/52   01/17/19 146/78   11/29/18 150/80                 Passed - Patient is age 6 or older        Passed - Recent (12 mo) or future (30 days) visit within the authorizing provider's specialty     Patient had office visit in the last 12 months or has a " "visit in the next 30 days with authorizing provider or within the authorizing provider's specialty.  See \"Patient Info\" tab in inbasket, or \"Choose Columns\" in Meds & Orders section of the refill encounter.              Passed - Medication is active on med list      atenolol (TENORMIN) 50 MG tablet  Last Written Prescription Date:  7/04/2018  Last Fill Quantity: 60,  # refills: 11   Last office visit: 1/17/2019 with prescribing provider:  1/17/2019   Future Office Visit:        Requested Prescriptions   Pending Prescriptions Disp Refills     allopurinol (ZYLOPRIM) 300 MG tablet 30 tablet 11     Sig: Take 1 tablet (300 mg) by mouth daily       Gout Agents Protocol Failed - 6/19/2019  9:51 AM        Failed - Has Uric Acid on file in past 12 months and value is less than 6     Recent Labs   Lab Test 03/21/16  1102   URIC 5.7     If level is 6mg/dL or greater, ok to refill one time and refer to provider.           Passed - CBC on file in past 12 months     Recent Labs   Lab Test 01/24/19  0626 01/17/19  1137   WBC  --  9.5   RBC  --  4.60   HGB 11.1* 13.6   HCT  --  41.0   PLT  --  324                 Passed - ALT on file in past 12 months     Recent Labs   Lab Test 01/15/19  1107   ALT 43             Passed - Recent (12 mo) or future (30 days) visit within the authorizing provider's specialty     Patient had office visit in the last 12 months or has a visit in the next 30 days with authorizing provider or within the authorizing provider's specialty.  See \"Patient Info\" tab in inbasket, or \"Choose Columns\" in Meds & Orders section of the refill encounter.              Passed - Medication is active on med list        Passed - Patient is age 18 or older        Passed - No active pregnancy on record        Passed - Normal serum creatinine on file in the past 12 months     Recent Labs   Lab Test 01/24/19  0626   CR 0.82             Passed - No positive pregnancy test in past year        atenolol (TENORMIN) 50 MG tablet 60 " "tablet 11     Sig: Take 2 tablets (100 mg) by mouth daily       Beta-Blockers Protocol Passed - 6/19/2019  9:51 AM        Passed - Blood pressure under 140/90 in past 12 months     BP Readings from Last 3 Encounters:   01/24/19 111/52   01/17/19 146/78   11/29/18 150/80                 Passed - Patient is age 6 or older        Passed - Recent (12 mo) or future (30 days) visit within the authorizing provider's specialty     Patient had office visit in the last 12 months or has a visit in the next 30 days with authorizing provider or within the authorizing provider's specialty.  See \"Patient Info\" tab in inbasket, or \"Choose Columns\" in Meds & Orders section of the refill encounter.              Passed - Medication is active on med list      allopurinol (ZYLOPRIM) 300 MG tabletLast Written Prescription Date:  7/04/2018  Last Fill Quantity: 30,  # refills: 11   Last office visit: 1/17/2019 with prescribing provider:  1/17/   Future Office Visit:      "

## 2019-06-19 NOTE — LETTER
June 20, 2019      Jenna Carla Law  [address] [facility] [address]          Dear Jenna,     While refilling your prescription for Allopurinol and Atenolol today, we noticed that you are due to have labs drawn (nonfasting).  Please schedule a lab only appointment within the next couple of weeks.    Taking care of your health is important to us and we look forward to seeing you in the near future.  Please call us at [phone] or [phone] (or use [employer]) to schedule an appointment.     Please disregard this notice if you have already made an appointment.        Sincerely,        Wilmar Boyer MD

## 2019-06-20 NOTE — TELEPHONE ENCOUNTER
Call pt. Due for nonfasting lab tests for diabetes, gout. Assist pt with scheduling lab appt in the next couple weeks. Med refilled

## 2019-08-19 DIAGNOSIS — I25.10 CORONARY ARTERY DISEASE INVOLVING NATIVE CORONARY ARTERY OF NATIVE HEART WITHOUT ANGINA PECTORIS: ICD-10-CM

## 2019-08-19 NOTE — TELEPHONE ENCOUNTER
"Requested Prescriptions   Pending Prescriptions Disp Refills     atorvastatin (LIPITOR) 20 MG tablet  Last Written Prescription Date:  08/29/2018  Last Fill Quantity: 90,  # refills: 03   Last Office Visit: 1/17/2019   Future Office Visit:      90 tablet 3     Sig: Take 1 tablet (20 mg) by mouth daily       Statins Protocol Passed - 8/19/2019  2:35 PM        Passed - LDL on file in past 12 months     Recent Labs   Lab Test 12/03/18  0928   LDL 61             Passed - No abnormal creatine kinase in past 12 months     No lab results found.             Passed - Recent (12 mo) or future (30 days) visit within the authorizing provider's specialty     Patient had office visit in the last 12 months or has a visit in the next 30 days with authorizing provider or within the authorizing provider's specialty.  See \"Patient Info\" tab in inbasket, or \"Choose Columns\" in Meds & Orders section of the refill encounter.              Passed - Medication is active on med list        Passed - Patient is age 18 or older        Passed - No active pregnancy on record        Passed - No positive pregnancy test in past 12 months          "

## 2019-08-20 RX ORDER — ATORVASTATIN CALCIUM 20 MG/1
20 TABLET, FILM COATED ORAL DAILY
Qty: 90 TABLET | Refills: 1 | Status: SHIPPED | OUTPATIENT
Start: 2019-08-20 | End: 2019-12-05

## 2019-09-30 ENCOUNTER — HEALTH MAINTENANCE LETTER (OUTPATIENT)
Age: 64
End: 2019-09-30

## 2019-10-15 DIAGNOSIS — E11.9 TYPE 2 DIABETES MELLITUS WITHOUT COMPLICATION, WITHOUT LONG-TERM CURRENT USE OF INSULIN (H): ICD-10-CM

## 2019-10-15 NOTE — LETTER
[facility]  [address] [id number] [phone]            Jenna Aguirre Law  [address] [facility] [address]        October 15, 2019    Dear Jenna,    While refilling your prescription today, we noticed that you are due for an appointment with your provider.  We will refill your prescription for 30 days, but a follow-up appointment must be made before any additional refills can be approved.     Taking care of your health is important to us and we look forward to seeing you in the near future.  Please call us at [phone] or [phone] (or use [email or web address]) to schedule an appointment.     Please disregard this notice if you have already made an appointment.    Sincerely,        [facility]

## 2019-10-15 NOTE — TELEPHONE ENCOUNTER
"Requested Prescriptions   Pending Prescriptions Disp Refills     metFORMIN (GLUCOPHAGE) 500 MG tablet  Last Written Prescription Date:  10/24/2018  Last Fill Quantity: 180,  # refills: 03   Last Office Visit: 1/17/2019   Future Office Visit:      180 tablet 3       Biguanide Agents Failed - 10/15/2019 10:47 AM        Failed - Blood pressure less than 140/90 in past 6 months     BP Readings from Last 3 Encounters:   01/24/19 111/52   01/17/19 146/78   11/29/18 150/80                 Failed - Patient has documented A1c within the specified period of time.     If HgbA1C is 8 or greater, it needs to be on file within the past 3 months.  If less than 8, must be on file within the past 6 months.     Recent Labs   Lab Test 01/15/19  1107   A1C 7.0*             Failed - Recent (6 mo) or future (30 days) visit within the authorizing provider's specialty     Patient had office visit in the last 6 months or has a visit in the next 30 days with authorizing provider or within the authorizing provider's specialty.  See \"Patient Info\" tab in inbasket, or \"Choose Columns\" in Meds & Orders section of the refill encounter.            Passed - Patient has documented LDL within the past 12 mos.     Recent Labs   Lab Test 12/03/18  0928   LDL 61             Passed - Patient has had a Microalbumin in the past 15 mos.     Recent Labs   Lab Test 08/30/18  0930   MICROL 14   UMALCR 15.84             Passed - Patient is age 10 or older        Passed - Patient's CR is NOT>1.4 OR Patient's EGFR is NOT<45 within past 12 mos.     Recent Labs   Lab Test 01/24/19  0626   GFRESTIMATED 75   GFRESTBLACK 87       Recent Labs   Lab Test 01/24/19  0626   CR 0.82             Passed - Patient does NOT have a diagnosis of CHF.        Passed - Medication is active on med list        Passed - Patient is not pregnant        Passed - Patient has not had a positive pregnancy test within the past 12 mos.           "

## 2019-10-29 ENCOUNTER — HEALTH MAINTENANCE LETTER (OUTPATIENT)
Age: 64
End: 2019-10-29

## 2019-11-12 ENCOUNTER — TELEPHONE (OUTPATIENT)
Dept: INTERNAL MEDICINE | Facility: CLINIC | Age: 64
End: 2019-11-12

## 2019-11-12 DIAGNOSIS — E78.5 HYPERLIPIDEMIA LDL GOAL <100: Primary | ICD-10-CM

## 2019-11-12 NOTE — TELEPHONE ENCOUNTER
Patient wondering when and if she should stop her Metformin.     Colonoscopy procedure is on 11/15/16041 at noon.     She has already stopped IBU, Fish oil, and Tylenol.    Ok to leave the message on [phone] (home)     Maria Luisa MON RN, BSN, PHN

## 2019-11-12 NOTE — TELEPHONE ENCOUNTER
Hold Metformin the day before and the AM of the colonoscopy procedure. Be sure pt has also stopped Aspirin 1 week before the procedure date (11/15/19 and, if she didn't and has been taking Aspirin still,  then pt to call [facility] to see if they still will do the procedure  with having been on Aspirin up to today.  Pt is also overdue for f/u diabetic and gout  Labs. Please assist pt with scheduling a fasting lab appt in the next week

## 2019-11-12 NOTE — TELEPHONE ENCOUNTER
Patient called back and informed of note below.    PCP RENETTA.    She had contacted [facility] today, she was informed she did not have to stop the aspirin only if she was on Warfarin (Coumadin). She has already stopped the Aspirin last Sunday. So she will continue to hold it either way.    She has lab scheduled appointment: 11/19/2019      Future Office Visit:   Next 5 appointments (look out 90 days)    Dec 05, 2019 11:00 AM CST  PHYSICAL with Wilmar Boyer MD  [facility] [address] [place] [id number] [phone]         Maria Luisa MON RN, BSN, PHN

## 2019-11-15 ENCOUNTER — TRANSFERRED RECORDS (OUTPATIENT)
Dept: HEALTH INFORMATION MANAGEMENT | Facility: CLINIC | Age: 64
End: 2019-11-15

## 2019-11-16 DIAGNOSIS — E03.9 HYPOTHYROIDISM, UNSPECIFIED TYPE: ICD-10-CM

## 2019-11-16 DIAGNOSIS — E11.9 TYPE 2 DIABETES MELLITUS WITHOUT COMPLICATION, WITHOUT LONG-TERM CURRENT USE OF INSULIN (H): ICD-10-CM

## 2019-11-16 NOTE — TELEPHONE ENCOUNTER
Requested Prescriptions   Pending Prescriptions Disp Refills     metFORMIN (GLUCOPHAGE) 500 MG tablet [Pharmacy Med Name: metFORMIN HCl Oral Tablet 500 MG] 60 tablet 0     Sig: TAKE ONE TABLET BY MOUTH TWICE DAILY WITH MEALS   Last Written Prescription Date:  10/15/2019  Last Fill Quantity: 60,  # refills: 0   Last Office Visit: 1/17/2019   Future Office Visit:    Next 5 appointments (look out 90 days)    Dec 05, 2019 11:00 AM CST  PHYSICAL with Wilmar Boyer MD  [facility] [address] [address] [phone]             Biguanide Agents Failed - 11/16/2019  7:01 AM        Failed - Blood pressure less than 140/90 in past 6 months     BP Readings from Last 3 Encounters:   01/24/19 111/52   01/17/19 146/78   11/29/18 150/80                 Failed - Patient has documented A1c within the specified period of time.     If HgbA1C is 8 or greater, it needs to be on file within the past 3 months.  If less than 8, must be on file within the past 6 months.     Recent Labs   Lab Test 01/15/19  1107   A1C 7.0*             Passed - Patient has documented LDL within the past 12 mos.     Recent Labs   Lab Test 12/03/18  0928   LDL 61             Passed - Patient has had a Microalbumin in the past 15 mos.     Recent Labs   Lab Test 08/30/18  0930   MICROL 14   UMALCR 15.84             Passed - Patient is age 10 or older        Passed - Patient's CR is NOT>1.4 OR Patient's EGFR is NOT<45 within past 12 mos.     Recent Labs   Lab Test 01/24/19  0626   GFRESTIMATED 75   GFRESTBLACK 87       Recent Labs   Lab Test 01/24/19  0626   CR 0.82             Passed - Patient does NOT have a diagnosis of CHF.        Passed - Medication is active on med list        Passed - Patient is not pregnant        Passed - Patient has not had a positive pregnancy test within the past 12 mos.         Passed - Recent (6 mo) or future (30 days) visit within the authorizing  "provider's specialty     Patient had office visit in the last 6 months or has a visit in the next 30 days with authorizing provider or within the authorizing provider's specialty.  See \"Patient Info\" tab in inbasket, or \"Choose Columns\" in Meds & Orders section of the refill encounter.            levothyroxine (SYNTHROID/LEVOTHROID) 75 MCG tablet [Pharmacy Med Name: Levothyroxine Sodium Oral Tablet 75 MCG] 30 tablet 2     Sig: Take 1 tablet (75 mcg) by mouth daily.   Last Written Prescription Date:  11/29/2018  Last Fill Quantity: 90,  # refills: 3   Last Office Visit: 1/17/2019   Future Office Visit:    Next 5 appointments (look out 90 days)    Dec 05, 2019 11:00 AM CST  PHYSICAL with Wilmar Boyer MD  [facility] [address] [place] [id number]  [phone]             Thyroid Protocol Passed - 11/16/2019  7:01 AM        Passed - Patient is 12 years or older        Passed - Recent (12 mo) or future (30 days) visit within the authorizing provider's specialty     Patient has had an office visit with the authorizing provider or a provider within the authorizing providers department within the previous 12 mos or has a future within next 30 days. See \"Patient Info\" tab in inbasket, or \"Choose Columns\" in Meds & Orders section of the refill encounter.              Passed - Medication is active on med list        Passed - Normal TSH on file in past 12 months     Recent Labs   Lab Test 01/15/19  1107   TSH 0.88              Passed - No active pregnancy on record     If patient is pregnant or has had a positive pregnancy test, please check TSH.          Passed - No positive pregnancy test in past 12 months     If patient is pregnant or has had a positive pregnancy test, please check TSH.            "

## 2019-11-19 DIAGNOSIS — E78.5 HYPERLIPIDEMIA LDL GOAL <100: ICD-10-CM

## 2019-11-19 DIAGNOSIS — E11.9 TYPE 2 DIABETES MELLITUS WITHOUT COMPLICATION, WITHOUT LONG-TERM CURRENT USE OF INSULIN (H): ICD-10-CM

## 2019-11-19 DIAGNOSIS — M1A.9XX0 CHRONIC GOUT WITHOUT TOPHUS, UNSPECIFIED CAUSE, UNSPECIFIED SITE: ICD-10-CM

## 2019-11-19 LAB
ALBUMIN SERPL-MCNC: 3.6 G/DL (ref 3.4–5)
ALP SERPL-CCNC: 77 U/L (ref 40–150)
ALT SERPL W P-5'-P-CCNC: 41 U/L (ref 0–50)
ANION GAP SERPL CALCULATED.3IONS-SCNC: 5 MMOL/L (ref 3–14)
AST SERPL W P-5'-P-CCNC: 42 U/L (ref 0–45)
BILIRUB SERPL-MCNC: 0.5 MG/DL (ref 0.2–1.3)
BUN SERPL-MCNC: 21 MG/DL (ref 7–30)
CALCIUM SERPL-MCNC: 8.9 MG/DL (ref 8.5–10.1)
CHLORIDE SERPL-SCNC: 104 MMOL/L (ref 94–109)
CHOLEST SERPL-MCNC: 128 MG/DL
CO2 SERPL-SCNC: 27 MMOL/L (ref 20–32)
CREAT SERPL-MCNC: 0.9 MG/DL (ref 0.52–1.04)
ERYTHROCYTE [DISTWIDTH] IN BLOOD BY AUTOMATED COUNT: 13.6 % (ref 10–15)
GFR SERPL CREATININE-BSD FRML MDRD: 66 ML/MIN/{1.73_M2}
GLUCOSE SERPL-MCNC: 122 MG/DL (ref 70–99)
HBA1C MFR BLD: 6.6 % (ref 0–5.6)
HCT VFR BLD AUTO: 41.3 % (ref 35–47)
HDLC SERPL-MCNC: 36 MG/DL
HGB BLD-MCNC: 13.5 G/DL (ref 11.7–15.7)
LDLC SERPL CALC-MCNC: 57 MG/DL
MCH RBC QN AUTO: 29.4 PG (ref 26.5–33)
MCHC RBC AUTO-ENTMCNC: 32.7 G/DL (ref 31.5–36.5)
MCV RBC AUTO: 90 FL (ref 78–100)
NONHDLC SERPL-MCNC: 92 MG/DL
PLATELET # BLD AUTO: 347 10E9/L (ref 150–450)
POTASSIUM SERPL-SCNC: 4.5 MMOL/L (ref 3.4–5.3)
PROT SERPL-MCNC: 7.2 G/DL (ref 6.8–8.8)
RBC # BLD AUTO: 4.59 10E12/L (ref 3.8–5.2)
SODIUM SERPL-SCNC: 136 MMOL/L (ref 133–144)
TRIGL SERPL-MCNC: 176 MG/DL
URATE SERPL-MCNC: 4.8 MG/DL (ref 2.6–6)
WBC # BLD AUTO: 9.1 10E9/L (ref 4–11)

## 2019-11-19 PROCEDURE — 80053 COMPREHEN METABOLIC PANEL: CPT | Performed by: INTERNAL MEDICINE

## 2019-11-19 PROCEDURE — 36415 COLL VENOUS BLD VENIPUNCTURE: CPT | Performed by: INTERNAL MEDICINE

## 2019-11-19 PROCEDURE — 84550 ASSAY OF BLOOD/URIC ACID: CPT | Performed by: INTERNAL MEDICINE

## 2019-11-19 PROCEDURE — 80061 LIPID PANEL: CPT | Performed by: INTERNAL MEDICINE

## 2019-11-19 PROCEDURE — 85027 COMPLETE CBC AUTOMATED: CPT | Performed by: INTERNAL MEDICINE

## 2019-11-19 PROCEDURE — 83036 HEMOGLOBIN GLYCOSYLATED A1C: CPT | Performed by: INTERNAL MEDICINE

## 2019-11-19 RX ORDER — LEVOTHYROXINE SODIUM 75 UG/1
75 TABLET ORAL DAILY
Qty: 30 TABLET | Refills: 0 | Status: SHIPPED | OUTPATIENT
Start: 2019-11-19 | End: 2019-12-05

## 2019-11-19 NOTE — TELEPHONE ENCOUNTER
Prescription approved per [facility] Refill Protocol.  [facility]

## 2019-12-05 ENCOUNTER — OFFICE VISIT (OUTPATIENT)
Dept: INTERNAL MEDICINE | Facility: CLINIC | Age: 64
End: 2019-12-05
Payer: COMMERCIAL

## 2019-12-05 VITALS
OXYGEN SATURATION: 98 % | WEIGHT: 183 LBS | DIASTOLIC BLOOD PRESSURE: 78 MMHG | RESPIRATION RATE: 16 BRPM | TEMPERATURE: 97.9 F | HEART RATE: 56 BPM | BODY MASS INDEX: 29.09 KG/M2 | SYSTOLIC BLOOD PRESSURE: 116 MMHG

## 2019-12-05 DIAGNOSIS — N39.3 STRESS INCONTINENCE: ICD-10-CM

## 2019-12-05 DIAGNOSIS — I10 ESSENTIAL HYPERTENSION: ICD-10-CM

## 2019-12-05 DIAGNOSIS — E03.9 HYPOTHYROIDISM, UNSPECIFIED TYPE: ICD-10-CM

## 2019-12-05 DIAGNOSIS — E78.5 HYPERLIPIDEMIA LDL GOAL <100: ICD-10-CM

## 2019-12-05 DIAGNOSIS — Z00.01 ENCOUNTER FOR ROUTINE ADULT MEDICAL EXAM WITH ABNORMAL FINDINGS: Primary | ICD-10-CM

## 2019-12-05 DIAGNOSIS — Z23 NEED FOR PROPHYLACTIC VACCINATION AGAINST STREPTOCOCCUS PNEUMONIAE (PNEUMOCOCCUS): ICD-10-CM

## 2019-12-05 DIAGNOSIS — M1A.9XX0 CHRONIC GOUT WITHOUT TOPHUS, UNSPECIFIED CAUSE, UNSPECIFIED SITE: ICD-10-CM

## 2019-12-05 DIAGNOSIS — E11.9 TYPE 2 DIABETES MELLITUS WITHOUT COMPLICATION, WITHOUT LONG-TERM CURRENT USE OF INSULIN (H): ICD-10-CM

## 2019-12-05 PROBLEM — N81.4 CYSTOCELE WITH PROLAPSE: Status: RESOLVED | Noted: 2019-01-23 | Resolved: 2019-12-05

## 2019-12-05 PROCEDURE — 90471 IMMUNIZATION ADMIN: CPT | Performed by: INTERNAL MEDICINE

## 2019-12-05 PROCEDURE — 90670 PCV13 VACCINE IM: CPT | Performed by: INTERNAL MEDICINE

## 2019-12-05 PROCEDURE — 99397 PER PM REEVAL EST PAT 65+ YR: CPT | Mod: 25 | Performed by: INTERNAL MEDICINE

## 2019-12-05 PROCEDURE — 99214 OFFICE O/P EST MOD 30 MIN: CPT | Mod: 25 | Performed by: INTERNAL MEDICINE

## 2019-12-05 RX ORDER — LOSARTAN POTASSIUM 100 MG/1
TABLET ORAL
Qty: 90 TABLET | Refills: 3 | Status: SHIPPED | OUTPATIENT
Start: 2019-12-05 | End: 2021-01-02

## 2019-12-05 RX ORDER — ATENOLOL 50 MG/1
100 TABLET ORAL DAILY
Qty: 180 TABLET | Refills: 3 | Status: SHIPPED | OUTPATIENT
Start: 2019-12-05 | End: 2021-01-02

## 2019-12-05 RX ORDER — LEVOTHYROXINE SODIUM 75 UG/1
75 TABLET ORAL DAILY
Qty: 90 TABLET | Refills: 3 | Status: SHIPPED | OUTPATIENT
Start: 2019-12-05 | End: 2020-12-02

## 2019-12-05 RX ORDER — AMLODIPINE BESYLATE 5 MG/1
5 TABLET ORAL DAILY
Qty: 90 TABLET | Refills: 3 | Status: SHIPPED | OUTPATIENT
Start: 2019-12-05 | End: 2020-12-02

## 2019-12-05 RX ORDER — ALLOPURINOL 300 MG/1
1 TABLET ORAL DAILY
Qty: 90 TABLET | Refills: 3 | Status: SHIPPED | OUTPATIENT
Start: 2019-12-05 | End: 2021-01-02

## 2019-12-05 RX ORDER — TOLTERODINE 4 MG/1
CAPSULE, EXTENDED RELEASE ORAL
Refills: 2 | COMMUNITY
Start: 2019-10-31 | End: 2021-01-02

## 2019-12-05 RX ORDER — HYDROCHLOROTHIAZIDE 25 MG/1
25 TABLET ORAL EVERY MORNING
Qty: 90 TABLET | Refills: 3 | Status: SHIPPED | OUTPATIENT
Start: 2019-12-05 | End: 2021-01-02

## 2019-12-05 RX ORDER — ATORVASTATIN CALCIUM 20 MG/1
20 TABLET, FILM COATED ORAL DAILY
Qty: 90 TABLET | Refills: 3 | Status: SHIPPED | OUTPATIENT
Start: 2019-12-05 | End: 2021-01-02

## 2019-12-05 ASSESSMENT — ACTIVITIES OF DAILY LIVING (ADL): CURRENT_FUNCTION: NO ASSISTANCE NEEDED

## 2019-12-05 NOTE — PROGRESS NOTES
"SUBJECTIVE:   Jenna Card is a 64 year old female who presents for Preventive Visit. Pt states she is not enrolled in Medicare  Are you in the first 12 months of your Medicare coverage?  No    Healthy Habits:     In general, how would you rate your overall health?  Good    Frequency of exercise:  None    Do you usually eat at least 4 servings of fruit and vegetables a day, include whole grains    & fiber and avoid regularly eating high fat or \"junk\" foods?  No    Taking medications regularly:  Yes    Medication side effects:  None    Ability to successfully perform activities of daily living:  No assistance needed    Home Safety:  No safety concerns identified    Hearing Impairment:  Difficulty understanding soft or whispered speech    In the past 6 months, have you been bothered by leaking of urine? Yes    In general, how would you rate your overall mental or emotional health?  Excellent      PHQ-2 Total Score: 0    Additional concerns today:  No    Do you feel safe in your environment? Yes    Have you ever done Advance Care Planning? (For example, a Health Directive, POLST, or a discussion with a medical provider or your loved ones about your wishes): Yes, patient states has an Advance Care Planning document and will bring a copy to the clinic.      Fall risk  Fallen 2 or more times in the past year?: No  Any fall with injury in the past year?: No    Cognitive Screening   1) Repeat 3 items (Leader, Season, Table)    2) Clock draw: NORMAL   3) 3 item recall: Recalls 2 objects   Results: NORMAL clock, 1-2 items recalled    Mini-CogTM Copyright SHANELLE Chandler. Licensed by the author for use in [facility]; reprinted with permission (isabel@.[facility]). All rights reserved.      Do you have sleep apnea, excessive snoring or daytime drowsiness?: yes    Reviewed and updated as needed this visit by clinical staff         Reviewed and updated as needed this visit by Provider        Social History     Tobacco " Use     Smoking status: Never Smoker     Smokeless tobacco: Never Used   Substance Use Topics     Alcohol use: Yes     Comment: 1 drink weekly     If you drink alcohol do you typically have >3 drinks per day or >7 drinks per week? No    Alcohol Use 12/5/2019   Prescreen: >3 drinks/day or >7 drinks/week? No   Prescreen: >3 drinks/day or >7 drinks/week? -         Current providers sharing in care for this patient include:   Patient Care Team:  Wilmar Boyer MD as PCP - General (Internal Medicine)  Wilmar Boyer MD as Assigned PCP    The following health maintenance items are reviewed in Epic and correct as of today:  Health Maintenance   Topic Date Due     DEXA  1955     ADVANCE CARE PLANNING  1955     ZOSTER IMMUNIZATION (1 of 2) 10/01/2003     PNEUMOCOCCAL IMMUNIZATION 65+ LOW/MEDIUM RISK (1 of 2 - PCV13) 10/01/2018     PHQ-2  01/01/2019     MEDICARE ANNUAL WELLNESS VISIT  07/16/2019     DIABETIC FOOT EXAM  07/16/2019     MICROALBUMIN  08/30/2019     INFLUENZA VACCINE (1) 09/01/2019     FALL RISK ASSESSMENT  11/29/2019     EYE EXAM  02/18/2020     A1C  05/19/2020     BMP  11/19/2020     LIPID  11/19/2020     TSH W/FREE T4 REFLEX  01/15/2021     MAMMO SCREENING  04/24/2021     DTAP/TDAP/TD IMMUNIZATION (2 - Td) 07/06/2027     COLONOSCOPY  11/15/2029     HEPATITIS C SCREENING  Completed     IPV IMMUNIZATION  Aged Out     MENINGITIS IMMUNIZATION  Aged Out     Labs reviewed in EPIC       SUGARS   105,124  104,101  111,132  105,112  107,123  97      Lab Results   Component Value Date     11/19/2019     Lab Results   Component Value Date    A1C 6.6 11/19/2019     Lab Results   Component Value Date    CHOL 128 11/19/2019     Lab Results   Component Value Date    LDL 57 11/19/2019     Lab Results   Component Value Date    HDL 36 11/19/2019     Lab Results   Component Value Date    TRIG 176 11/19/2019     Lab Results   Component Value Date    CR 0.90 11/19/2019     Lab Results   Component Value Date    ALT  "41 11/19/2019     Lab Results   Component Value Date    AST 42 11/19/2019     Lab Results   Component Value Date    MICROL 14 08/30/2018     Lab Results   Component Value Date    TSH 0.88 01/15/2019         Colonoscopy Nov 2019. Repeat 3 years  GYN -  ([facility]).  Has DEXA done through them  Eye exam Feb 2019    Review of Systems  CONSTITUTIONAL: NEGATIVE for fever, chills. 7 pounds in 1 year  INTEGUMENTARY/SKIN: NEGATIVE for worrisome rashes, moles or lesions  EYES: NEGATIVE for vision changes or irritation. Eye exam  Feb 2019  ENT/MOUTH: NEGATIVE for ear, mouth and throat problems  RESP: NEGATIVE for significant cough or SOB. Hx BIJU. Not using CPAP  BREAST: NEGATIVE for masses, tenderness or discharge. Mammogram UTD  CV: NEGATIVE for chest pain, palpitations or peripheral edema  GI: NEGATIVE for nausea, abdominal pain, heartburn. Mild constipation with Detrol  : NEGATIVE for frequency, dysuria, or hematuria. Has some urine stress incontinence. Better with Detrol and bladder lift surgery. Mild dry mouth  MUSCULOSKELETAL: NEGATIVE for significant arthralgias or myalgia that limits activity. Rare Tylenol. No gout flares with med  NEURO: NEGATIVE for weakness, dizziness or paresthesias  ENDOCRINE: NEGATIVE for temperature intolerance. DM controlled.   HEME: NEGATIVE for bleeding problems  PSYCHIATRIC: NEGATIVE for changes in mood or affect    OBJECTIVE:   /78   Pulse 56   Temp 97.9  F (36.6  C) (Temporal)   Resp 16   Wt 83 kg (183 lb)   SpO2 98%   BMI 29.09 kg/m   Estimated body mass index is 29.21 kg/m  as calculated from the following:    Height as of 1/23/19: 1.689 m (5' 6.5\").    Weight as of 1/23/19: 83.3 kg (183 lb 11.2 oz).  Physical Exam  General appearance - healthy, alert, no distress  Skin - No rashes or lesions.  Head - normocephalic, atraumatic  Eyes - GIRMA, EOMI, fundi exam with nondilated pupils negative.  Ears - External ears normal. Canals clear. TM's normal.  Nose/Sinuses - " Nares normal. Septum midline. Mucosa normal. No drainage or sinus tenderness.  Oropharynx - No erythema, no adenopathy, no exudates.  Neck - Supple without adenopathy or thyromegaly. No bruits.  Lungs - Clear to auscultation without wheezes/rhonchi.  Heart - Regular rate and rhythm without murmurs, clicks, or gallops.  Nodes - No supraclavicular, axillary, or inguinal adenopathy palpable.  Breasts - deferred  Abdomen - Abdomen soft, non-tender. BS normal. No masses or hepatosplenomegaly palpable. No bruits.  Extremities -No cyanosis, clubbing or edema.    Musculoskeletal - Spine ROM normal. Muscular strength intact.   Peripheral pulses - radial=4/4, femoral=4/4, posterior tibial=4/4, dorsalis pedis=4/4,  Neuro - Gait normal. Reflexes normal and symmetric. Sensation grossly WNL.  Genital/Rectal - deferred        ASSESSMENT / PLAN:   1. Encounter for routine adult medical exam with abnormal findings  See below for healthcare maintenance.  Otherwise up-to-date.  Patient will have DEXA done through her gynecology office     2. Hyperlipidemia LDL goal <100  Controlled.  Continue current medication.  Future labs ordered  - Lipid panel reflex to direct LDL Fasting; Future  - Comprehensive metabolic panel; Future  - atorvastatin (LIPITOR) 20 MG tablet; Take 1 tablet (20 mg) by mouth daily  Dispense: 90 tablet; Refill: 3    3. Hypothyroidism, unspecified type  Controlled.  Continue current medication.  Future labs ordered  - TSH with free T4 reflex; Future  - levothyroxine (SYNTHROID/LEVOTHROID) 75 MCG tablet; Take 1 tablet (75 mcg) by mouth daily  Dispense: 90 tablet; Refill: 3    4. Type 2 diabetes mellitus without complication, without long-term current use of insulin (H)   A1C at goal. To help with further weight loss and insulin preservation in pancreas, will increase Metformin to 1000mg BID. Pt denies any loose stool issues now  - Albumin Random Urine Quantitative with Creat Ratio; Future  - Hemoglobin A1c; Future  -  "Basic metabolic panel; Future  - metFORMIN (GLUCOPHAGE) 1000 MG tablet; Take 1 tablet (1,000 mg) by mouth 2 times daily (with meals)  Dispense: 180 tablet; Refill: 3    5. Essential hypertension  Controlled.  Continue current medication  - losartan (COZAAR) 100 MG tablet; TAKE HALF TABLET BY MOUTH TWICE DAILY  Dispense: 90 tablet; Refill: 3  - hydrochlorothiazide (HYDRODIURIL) 25 MG tablet; Take 1 tablet (25 mg) by mouth every morning  Dispense: 90 tablet; Refill: 3  - atenolol (TENORMIN) 50 MG tablet; Take 2 tablets (100 mg) by mouth daily  Dispense: 180 tablet; Refill: 3  - amLODIPine (NORVASC) 5 MG tablet; Take 1 tablet (5 mg) by mouth daily  Dispense: 90 tablet; Refill: 3    6. Chronic gout without tophus, unspecified cause, unspecified site  Future labs ordered  - allopurinol (ZYLOPRIM) 300 MG tablet; Take 1 tablet (300 mg) by mouth daily  Dispense: 90 tablet; Refill: 3  - Uric acid; Future controlled.  Continue current medication.    7. Stress incontinence  Improved after surgery and Detrol.  Patient having some mild dry mouth and constipation side effects with medication.  That being prescribed by urology.  Informed patient she may talk with them for opinion regarding trying Myrbetriq or Trospium instead of Detrol to see if better tolerated    8. Need for prophylactic vaccination against Streptococcus pneumoniae (pneumococcus)  - Pneumococcal vaccine 13 valent PCV13 IM (Prevnar) [86133]  - ADMIN MEDICARE: Pneumococcal Vaccine ()      COUNSELING:  Reviewed preventive health counseling, as reflected in patient instructions    Estimated body mass index is 29.21 kg/m  as calculated from the following:    Height as of 1/23/19: 1.689 m (5' 6.5\").    Weight as of 1/23/19: 83.3 kg (183 lb 11.2 oz).         reports that she has never smoked. She has never used smokeless tobacco.      Appropriate preventive services were discussed with this patient, including applicable screening as appropriate for cardiovascular " disease, diabetes, osteopenia/osteoporosis, and glaucoma.  As appropriate for age/gender, discussed screening for colorectal cancer, prostate cancer, breast cancer, and cervical cancer. Checklist reviewing preventive services available has been given to the patient.    Reviewed patients plan of care and provided an AVS. The Basic Care Plan (routine screening as documented in Health Maintenance) for Jenna meets the Care Plan requirement. This Care Plan has been established and reviewed with the Patient.    Counseling Resources:  ATP IV Guidelines  Pooled Cohorts Equation Calculator  Breast Cancer Risk Calculator  FRAX Risk Assessment  ICSI Preventive Guidelines  Dietary Guidelines for Americans, 2010  USDA's MyPlate  ASA Prophylaxis  Lung CA Screening      PLAN:   Increase Metformin to 500mg in AM and 1000mg in PM for 2 weeks. If no loose stools, then increase to 1000mg twice a day. New prescripton on file for 1000mg tabs.   Email me if side effects with higher dose  Nonfasting labs in 3 months  Prevnar vaccine today  Shingrix vaccine in a few weeks or more with nurse only appt. Repeat the 2nd dose 2-6 months later  Continue other meds.  Prescriptions refilled.    Continue to reduce calorie/carbohydrate (sugar, bread, potato, pasta, rice, alcohol etc)  intake in diet.  Increase color on your plate with fruits and vegetables. Increase  frequency of walking or other aerobic exercise as able (goal is daily)  Pt was informed regarding extra E&M billing for management of new or established medical issues not related to today's wellness visit      Wilmar Boyer MD  [facility]

## 2019-12-05 NOTE — PATIENT INSTRUCTIONS
Increase Metformin to 500mg in AM and 1000mg in PM for 2 weeks. If no loose stools, then increase to 1000mg twice a day. New prescripton on file for 1000mg tabs.   Email me if side effects with higher dose  Nonfasting labs in 3 months  Prevnar vaccine today  Shingrix vaccine in a few weeks or more with nurse only appt. Repeat the 2nd dose 2-6 months later  Continue other meds.  Prescriptions refilled.    Continue to reduce calorie/carbohydrate (sugar, bread, potato, pasta, rice, alcohol etc)  intake in diet.  Increase color on your plate with fruits and vegetables. Increase  frequency of walking or other aerobic exercise as able (goal is daily)  Pt was informed regarding extra E&M billing for management of new or established medical issues not related to today's wellness visit

## 2019-12-09 DIAGNOSIS — E11.9 TYPE 2 DIABETES MELLITUS WITHOUT COMPLICATION, WITHOUT LONG-TERM CURRENT USE OF INSULIN (H): ICD-10-CM

## 2019-12-10 NOTE — TELEPHONE ENCOUNTER
"Requested Prescriptions   Pending Prescriptions Disp Refills     blood glucose (ACCU-CHEK MARSHALL PLUS) test strip 1 Box 1     Sig: Check blood sugars once daily       Diabetic Supplies Protocol Passed - 12/9/2019  8:22 AM        Passed - Medication is active on med list        Passed - Patient is 18 years of age or older        Passed - Recent (6 mo) or future (30 days) visit within the authorizing provider's specialty     Patient had office visit in the last 6 months or has a visit in the next 30 days with authorizing provider.  See \"Patient Info\" tab in inbasket, or \"Choose Columns\" in Meds & Orders section of the refill encounter.              Prescription approved per [facility] Refill Protocol.    Maria Luisa DELONGN, RN, PHN      "

## 2020-06-01 ENCOUNTER — HOSPITAL ENCOUNTER (OUTPATIENT)
Dept: MAMMOGRAPHY | Facility: CLINIC | Age: 65
Discharge: HOME | End: 2020-06-01
Attending: OBSTETRICS & GYNECOLOGY | Admitting: OBSTETRICS & GYNECOLOGY
Payer: COMMERCIAL

## 2020-06-01 DIAGNOSIS — Z12.31 VISIT FOR SCREENING MAMMOGRAM: ICD-10-CM

## 2020-06-01 PROCEDURE — 77067 SCR MAMMO BI INCL CAD: CPT

## 2020-06-17 ENCOUNTER — DOCUMENTATION ONLY (OUTPATIENT)
Dept: INTERNAL MEDICINE | Facility: CLINIC | Age: 65
End: 2020-06-17

## 2020-06-17 NOTE — PROGRESS NOTES
Chart reviewed by Ambulatory Quality and Data team    Please abstract the following data from this visit with this patient into the appropriate field in Epic:    Tests that can be patient reported without a hard copy:        Other Tests found in the patient's chart through Chart Review/Care Everywhere:    Eye exam with ophthalmology on this date: 2/19/2019    Note to Abstraction: If this section is blank, no results were found via Chart Review/Care Everywhere.

## 2020-06-29 ENCOUNTER — TRANSFERRED RECORDS (OUTPATIENT)
Dept: HEALTH INFORMATION MANAGEMENT | Facility: CLINIC | Age: 65
End: 2020-06-29

## 2020-06-29 LAB — RETINOPATHY: NEGATIVE

## 2020-12-02 DIAGNOSIS — E03.9 HYPOTHYROIDISM, UNSPECIFIED TYPE: ICD-10-CM

## 2020-12-02 DIAGNOSIS — I10 ESSENTIAL HYPERTENSION: ICD-10-CM

## 2020-12-02 RX ORDER — AMLODIPINE BESYLATE 5 MG/1
5 TABLET ORAL DAILY
Qty: 30 TABLET | Refills: 0 | Status: SHIPPED | OUTPATIENT
Start: 2020-12-02 | End: 2021-01-02

## 2020-12-02 RX ORDER — LEVOTHYROXINE SODIUM 75 UG/1
75 TABLET ORAL DAILY
Qty: 30 TABLET | Refills: 0 | Status: SHIPPED | OUTPATIENT
Start: 2020-12-02 | End: 2021-01-02 | Stop reason: DRUGHIGH

## 2020-12-02 NOTE — LETTER
[facility]  [address] [address]  [phone]      12/10/2020       Jenna Aguirre Law  [address] [facility] [address]        Dear Jenna,  While refilling your prescription today, we noticed that you are due for a follow up appointment and fasting labs within the next 2 weeks and then follow up with Dr. Boyer 1 to 2 weeks after. We will refill your prescription for 30 days. Please call to schedule to a fasting lab appointment along with a separate follow up appointment with Dr. Boyer a few days after labs are complete to review those results and address other medical issues as needed.  Dr Boyer would like you to check your blood sugars twice a day (before breakfast and bedtime) for 4 days prior to the appointment with him, write them down and have them available to review with the appointment.    Taking care of your health is important to us and we look forward to seeing you in the near future.  Please call us at [phone] or [phone] (or use [employer]) to schedule an appointment.     Please disregard this notice if you have already made an appointment.         Sincerely,    Wilmar Boyer MD/Adrianna Camarena, [facility]  Internal Medicine

## 2020-12-02 NOTE — TELEPHONE ENCOUNTER
Routing refill request to provider for review/approval because:  Labs not current:  TSH, BMP    Last office visit: 12/5/2019 with prescribing provider  Future Office Visit:      Maria Luisa DELONGN, RN, PHN

## 2020-12-02 NOTE — TELEPHONE ENCOUNTER
Patient last seen over a year ago.  Was supposed to have labs done last in March 2020.  Medications refilled for 30 days.  Will address more long-term refill of medications at follow-up appointment.  Call patient and schedule a fasting blood and urine lab appointment in the next couple weeks along with appointment to see me 1 to 2 weeks later to review lab results, blood pressure status, etc. Check blood sugars twice a day (before breakfast and bedtime) for 4 days prior to the appointment with me, write them down and have them available to review with the appointment

## 2020-12-18 DIAGNOSIS — E11.9 TYPE 2 DIABETES MELLITUS WITHOUT COMPLICATION, WITHOUT LONG-TERM CURRENT USE OF INSULIN (H): ICD-10-CM

## 2020-12-18 RX ORDER — BLOOD SUGAR DIAGNOSTIC
STRIP MISCELLANEOUS
Qty: 50 EACH | Refills: 0 | Status: SHIPPED | OUTPATIENT
Start: 2020-12-18 | End: 2021-01-02

## 2020-12-21 DIAGNOSIS — M1A.9XX0 CHRONIC GOUT WITHOUT TOPHUS, UNSPECIFIED CAUSE, UNSPECIFIED SITE: ICD-10-CM

## 2020-12-21 DIAGNOSIS — E03.9 HYPOTHYROIDISM, UNSPECIFIED TYPE: ICD-10-CM

## 2020-12-21 DIAGNOSIS — E11.9 TYPE 2 DIABETES MELLITUS WITHOUT COMPLICATION, WITHOUT LONG-TERM CURRENT USE OF INSULIN (H): ICD-10-CM

## 2020-12-21 DIAGNOSIS — E78.5 HYPERLIPIDEMIA LDL GOAL <100: ICD-10-CM

## 2020-12-21 LAB
ALBUMIN SERPL-MCNC: 3.7 G/DL (ref 3.4–5)
ALP SERPL-CCNC: 67 U/L (ref 40–150)
ALT SERPL W P-5'-P-CCNC: 34 U/L (ref 0–50)
ANION GAP SERPL CALCULATED.3IONS-SCNC: 1 MMOL/L (ref 3–14)
AST SERPL W P-5'-P-CCNC: 40 U/L (ref 0–45)
BILIRUB SERPL-MCNC: 0.4 MG/DL (ref 0.2–1.3)
BUN SERPL-MCNC: 19 MG/DL (ref 7–30)
CALCIUM SERPL-MCNC: 8.4 MG/DL (ref 8.5–10.1)
CHLORIDE SERPL-SCNC: 107 MMOL/L (ref 94–109)
CHOLEST SERPL-MCNC: 124 MG/DL
CO2 SERPL-SCNC: 29 MMOL/L (ref 20–32)
CREAT SERPL-MCNC: 0.77 MG/DL (ref 0.52–1.04)
CREAT UR-MCNC: 214 MG/DL
GFR SERPL CREATININE-BSD FRML MDRD: 79 ML/MIN/{1.73_M2}
GLUCOSE SERPL-MCNC: 114 MG/DL (ref 70–99)
HBA1C MFR BLD: 6.4 % (ref 0–5.6)
HDLC SERPL-MCNC: 39 MG/DL
LDLC SERPL CALC-MCNC: 53 MG/DL
MICROALBUMIN UR-MCNC: 50 MG/L
MICROALBUMIN/CREAT UR: 23.32 MG/G CR (ref 0–25)
NONHDLC SERPL-MCNC: 85 MG/DL
POTASSIUM SERPL-SCNC: 4.2 MMOL/L (ref 3.4–5.3)
PROT SERPL-MCNC: 7.5 G/DL (ref 6.8–8.8)
SODIUM SERPL-SCNC: 137 MMOL/L (ref 133–144)
T4 FREE SERPL-MCNC: 1.39 NG/DL (ref 0.76–1.46)
TRIGL SERPL-MCNC: 160 MG/DL
TSH SERPL DL<=0.005 MIU/L-ACNC: 4.33 MU/L (ref 0.4–4)
URATE SERPL-MCNC: 4 MG/DL (ref 2.6–6)

## 2020-12-21 PROCEDURE — 80053 COMPREHEN METABOLIC PANEL: CPT | Performed by: INTERNAL MEDICINE

## 2020-12-21 PROCEDURE — 84443 ASSAY THYROID STIM HORMONE: CPT | Performed by: INTERNAL MEDICINE

## 2020-12-21 PROCEDURE — 82043 UR ALBUMIN QUANTITATIVE: CPT | Performed by: INTERNAL MEDICINE

## 2020-12-21 PROCEDURE — 84550 ASSAY OF BLOOD/URIC ACID: CPT | Performed by: INTERNAL MEDICINE

## 2020-12-21 PROCEDURE — 84439 ASSAY OF FREE THYROXINE: CPT | Performed by: INTERNAL MEDICINE

## 2020-12-21 PROCEDURE — 83036 HEMOGLOBIN GLYCOSYLATED A1C: CPT | Performed by: INTERNAL MEDICINE

## 2020-12-21 PROCEDURE — 80061 LIPID PANEL: CPT | Performed by: INTERNAL MEDICINE

## 2020-12-21 PROCEDURE — 36415 COLL VENOUS BLD VENIPUNCTURE: CPT | Performed by: INTERNAL MEDICINE

## 2021-01-02 DIAGNOSIS — E03.9 HYPOTHYROIDISM, UNSPECIFIED TYPE: ICD-10-CM

## 2021-01-02 DIAGNOSIS — M1A.9XX0 CHRONIC GOUT WITHOUT TOPHUS, UNSPECIFIED CAUSE, UNSPECIFIED SITE: ICD-10-CM

## 2021-01-02 DIAGNOSIS — I10 ESSENTIAL HYPERTENSION: ICD-10-CM

## 2021-01-02 DIAGNOSIS — E78.5 HYPERLIPIDEMIA LDL GOAL <100: ICD-10-CM

## 2021-01-02 DIAGNOSIS — E11.9 TYPE 2 DIABETES MELLITUS WITHOUT COMPLICATION, WITHOUT LONG-TERM CURRENT USE OF INSULIN (H): ICD-10-CM

## 2021-01-02 DIAGNOSIS — R32 URINARY INCONTINENCE, UNSPECIFIED TYPE: Primary | ICD-10-CM

## 2021-01-02 RX ORDER — LEVOTHYROXINE SODIUM 88 UG/1
88 TABLET ORAL DAILY
Qty: 90 TABLET | Refills: 0 | Status: SHIPPED | OUTPATIENT
Start: 2021-01-02 | End: 2021-03-15

## 2021-01-02 RX ORDER — BLOOD SUGAR DIAGNOSTIC
STRIP MISCELLANEOUS
Qty: 100 EACH | Refills: 3 | Status: SHIPPED | OUTPATIENT
Start: 2021-01-02 | End: 2021-02-01 | Stop reason: ALTCHOICE

## 2021-01-02 RX ORDER — LANCETS
EACH MISCELLANEOUS
Qty: 100 EACH | Refills: 3 | Status: SHIPPED | OUTPATIENT
Start: 2021-01-02 | End: 2022-03-11

## 2021-01-02 RX ORDER — ATORVASTATIN CALCIUM 20 MG/1
20 TABLET, FILM COATED ORAL DAILY
Qty: 90 TABLET | Refills: 0 | Status: SHIPPED | OUTPATIENT
Start: 2021-01-02 | End: 2021-03-28

## 2021-01-02 RX ORDER — ALLOPURINOL 300 MG/1
1 TABLET ORAL DAILY
Qty: 90 TABLET | Refills: 0 | Status: SHIPPED | OUTPATIENT
Start: 2021-01-02 | End: 2021-03-28

## 2021-01-02 RX ORDER — LOSARTAN POTASSIUM 100 MG/1
TABLET ORAL
Qty: 90 TABLET | Refills: 0 | Status: SHIPPED | OUTPATIENT
Start: 2021-01-02 | End: 2021-03-28

## 2021-01-02 RX ORDER — TOLTERODINE 4 MG/1
CAPSULE, EXTENDED RELEASE ORAL
Qty: 90 CAPSULE | Refills: 0 | Status: SHIPPED | OUTPATIENT
Start: 2021-01-02 | End: 2021-04-08

## 2021-01-02 RX ORDER — HYDROCHLOROTHIAZIDE 25 MG/1
25 TABLET ORAL EVERY MORNING
Qty: 90 TABLET | Refills: 0 | Status: SHIPPED | OUTPATIENT
Start: 2021-01-02 | End: 2021-03-28

## 2021-01-02 RX ORDER — ATENOLOL 50 MG/1
100 TABLET ORAL DAILY
Qty: 180 TABLET | Refills: 0 | Status: SHIPPED | OUTPATIENT
Start: 2021-01-02 | End: 2021-03-28

## 2021-01-02 RX ORDER — AMLODIPINE BESYLATE 5 MG/1
5 TABLET ORAL DAILY
Qty: 90 TABLET | Refills: 0 | Status: SHIPPED | OUTPATIENT
Start: 2021-01-02 | End: 2021-03-28

## 2021-01-15 ENCOUNTER — HEALTH MAINTENANCE LETTER (OUTPATIENT)
Age: 66
End: 2021-01-15

## 2021-01-29 ENCOUNTER — TELEPHONE (OUTPATIENT)
Dept: INTERNAL MEDICINE | Facility: CLINIC | Age: 66
End: 2021-01-29

## 2021-01-29 DIAGNOSIS — E11.9 TYPE 2 DIABETES MELLITUS WITHOUT COMPLICATION, WITHOUT LONG-TERM CURRENT USE OF INSULIN (H): Primary | ICD-10-CM

## 2021-01-29 NOTE — TELEPHONE ENCOUNTER
Prior Authorization Retail Medication Request    Medication/Dose: blood glucose (ACCU-CHEK MARSHALL PLUS) test strip  ICD code (if different than what is on RX):  E11.9  Previously Tried and Failed:    Rationale:      Insurance Name:  [employer]  Insurance ID:  [id number]      Pharmacy Information (if different than what is on RX)  Name:  [facility]  Phone:  [phone]

## 2021-02-01 NOTE — TELEPHONE ENCOUNTER
PRIOR AUTHORIZATION DENIED    Medication: blood glucose (ACCU-CHEK MARSHALL PLUS) test strip    Denial Date: 2/1/2021    Denial Rational:              Appeal Information:    If you would like to appeal, please supply P/A team with a letter of medical necessity with clinical reason.

## 2021-03-15 DIAGNOSIS — E03.9 HYPOTHYROIDISM, UNSPECIFIED TYPE: ICD-10-CM

## 2021-03-15 LAB — TSH SERPL DL<=0.005 MIU/L-ACNC: 2.8 MU/L (ref 0.4–4)

## 2021-03-15 PROCEDURE — 36415 COLL VENOUS BLD VENIPUNCTURE: CPT | Performed by: INTERNAL MEDICINE

## 2021-03-15 PROCEDURE — 84443 ASSAY THYROID STIM HORMONE: CPT | Performed by: INTERNAL MEDICINE

## 2021-03-15 RX ORDER — LEVOTHYROXINE SODIUM 88 UG/1
88 TABLET ORAL DAILY
Qty: 90 TABLET | Refills: 3 | Status: SHIPPED | OUTPATIENT
Start: 2021-03-15 | End: 2022-03-10

## 2021-03-27 DIAGNOSIS — M1A.9XX0 CHRONIC GOUT WITHOUT TOPHUS, UNSPECIFIED CAUSE, UNSPECIFIED SITE: ICD-10-CM

## 2021-03-27 DIAGNOSIS — E11.9 TYPE 2 DIABETES MELLITUS WITHOUT COMPLICATION, WITHOUT LONG-TERM CURRENT USE OF INSULIN (H): ICD-10-CM

## 2021-03-27 DIAGNOSIS — E78.5 HYPERLIPIDEMIA LDL GOAL <100: ICD-10-CM

## 2021-03-27 DIAGNOSIS — I10 ESSENTIAL HYPERTENSION: ICD-10-CM

## 2021-03-28 RX ORDER — HYDROCHLOROTHIAZIDE 25 MG/1
25 TABLET ORAL EVERY MORNING
Qty: 90 TABLET | Refills: 3 | Status: SHIPPED | OUTPATIENT
Start: 2021-03-28 | End: 2022-03-29

## 2021-03-28 RX ORDER — ATORVASTATIN CALCIUM 20 MG/1
20 TABLET, FILM COATED ORAL DAILY
Qty: 90 TABLET | Refills: 3 | Status: SHIPPED | OUTPATIENT
Start: 2021-03-28 | End: 2022-03-30

## 2021-03-28 RX ORDER — ATENOLOL 50 MG/1
100 TABLET ORAL DAILY
Qty: 180 TABLET | Refills: 3 | Status: SHIPPED | OUTPATIENT
Start: 2021-03-28 | End: 2021-04-08

## 2021-03-28 RX ORDER — AMLODIPINE BESYLATE 5 MG/1
5 TABLET ORAL DAILY
Qty: 90 TABLET | Refills: 3 | Status: SHIPPED | OUTPATIENT
Start: 2021-03-28 | End: 2022-03-29

## 2021-03-28 RX ORDER — ALLOPURINOL 300 MG/1
1 TABLET ORAL DAILY
Qty: 90 TABLET | Refills: 3 | Status: SHIPPED | OUTPATIENT
Start: 2021-03-28 | End: 2022-03-30

## 2021-03-28 RX ORDER — LOSARTAN POTASSIUM 100 MG/1
TABLET ORAL
Qty: 90 TABLET | Refills: 3 | Status: SHIPPED | OUTPATIENT
Start: 2021-03-28 | End: 2021-04-08

## 2021-04-06 ASSESSMENT — ACTIVITIES OF DAILY LIVING (ADL): CURRENT_FUNCTION: NO ASSISTANCE NEEDED

## 2021-04-08 ENCOUNTER — OFFICE VISIT (OUTPATIENT)
Dept: INTERNAL MEDICINE | Facility: CLINIC | Age: 66
End: 2021-04-08
Payer: COMMERCIAL

## 2021-04-08 VITALS
TEMPERATURE: 97.5 F | SYSTOLIC BLOOD PRESSURE: 132 MMHG | HEIGHT: 66 IN | RESPIRATION RATE: 16 BRPM | OXYGEN SATURATION: 99 % | WEIGHT: 178 LBS | BODY MASS INDEX: 28.61 KG/M2 | DIASTOLIC BLOOD PRESSURE: 74 MMHG | HEART RATE: 66 BPM

## 2021-04-08 DIAGNOSIS — E78.5 HYPERLIPIDEMIA LDL GOAL <100: ICD-10-CM

## 2021-04-08 DIAGNOSIS — R25.2 LEG CRAMPS: ICD-10-CM

## 2021-04-08 DIAGNOSIS — Z00.01 ENCOUNTER FOR ROUTINE ADULT MEDICAL EXAM WITH ABNORMAL FINDINGS: ICD-10-CM

## 2021-04-08 DIAGNOSIS — Z23 NEED FOR PROPHYLACTIC VACCINATION AGAINST STREPTOCOCCUS PNEUMONIAE (PNEUMOCOCCUS): ICD-10-CM

## 2021-04-08 DIAGNOSIS — E03.9 HYPOTHYROIDISM, UNSPECIFIED TYPE: ICD-10-CM

## 2021-04-08 DIAGNOSIS — M1A.9XX0 CHRONIC GOUT WITHOUT TOPHUS, UNSPECIFIED CAUSE, UNSPECIFIED SITE: ICD-10-CM

## 2021-04-08 DIAGNOSIS — I10 ESSENTIAL HYPERTENSION: ICD-10-CM

## 2021-04-08 DIAGNOSIS — E11.9 TYPE 2 DIABETES MELLITUS WITHOUT COMPLICATION, WITHOUT LONG-TERM CURRENT USE OF INSULIN (H): ICD-10-CM

## 2021-04-08 DIAGNOSIS — Z12.31 ENCOUNTER FOR SCREENING MAMMOGRAM FOR BREAST CANCER: ICD-10-CM

## 2021-04-08 PROCEDURE — 99397 PER PM REEVAL EST PAT 65+ YR: CPT | Mod: 25 | Performed by: INTERNAL MEDICINE

## 2021-04-08 PROCEDURE — 99207 PR FOOT EXAM NO CHARGE: CPT | Mod: 25 | Performed by: INTERNAL MEDICINE

## 2021-04-08 PROCEDURE — 90471 IMMUNIZATION ADMIN: CPT | Performed by: INTERNAL MEDICINE

## 2021-04-08 PROCEDURE — 99214 OFFICE O/P EST MOD 30 MIN: CPT | Mod: 25 | Performed by: INTERNAL MEDICINE

## 2021-04-08 PROCEDURE — 90732 PPSV23 VACC 2 YRS+ SUBQ/IM: CPT | Performed by: INTERNAL MEDICINE

## 2021-04-08 RX ORDER — TROSPIUM CHLORIDE 20 MG/1
20 TABLET, FILM COATED ORAL 2 TIMES DAILY
COMMUNITY
Start: 2021-04-08

## 2021-04-08 RX ORDER — LOSARTAN POTASSIUM 100 MG/1
TABLET ORAL
Qty: 90 TABLET | Refills: 3 | Status: SHIPPED | OUTPATIENT
Start: 2021-04-08 | End: 2022-03-29

## 2021-04-08 RX ORDER — ATENOLOL 50 MG/1
50 TABLET ORAL DAILY
Qty: 90 TABLET | Refills: 3 | Status: SHIPPED | OUTPATIENT
Start: 2021-04-08 | End: 2022-04-29

## 2021-04-08 ASSESSMENT — MIFFLIN-ST. JEOR: SCORE: 1359.15

## 2021-04-08 ASSESSMENT — ACTIVITIES OF DAILY LIVING (ADL): CURRENT_FUNCTION: NO ASSISTANCE NEEDED

## 2021-04-08 NOTE — PATIENT INSTRUCTIONS
Reduce Atenolol to 50mg tab, 1 tab daily for blood pressure   Continue other meds.  Continue diet/exercise for further weight loss   Call  [phone] or use [employer] to schedule a future lab appointment nonfasting in the next 1 week and   fasting in 6 months.   Vaccinations: Pneumococcal 23 vaccine   Future: Shingrix vaccine coverage for shingles prevention.  This is a 2 shot series done 2-6 months apart. Await 1 month before getting. Schedule nurse only appt for vaccine if insurance says to have done in clinic  Mammogram after 6/2/21  Colonoscopy Nov 2022  Schedule an eye appointment  Pt was informed regarding extra E&M billing for management of new or established medical issues not related to today's wellness visit

## 2021-04-08 NOTE — PROGRESS NOTES
"   SUBJECTIVE:   CC: Jenna Card is an 65 year old woman who presents for preventive health visit and follow-up regarding diabetes, gout, hyperlipidemia, hypertension    Healthy Habits:     In general, how would you rate your overall health?  Fair    Frequency of exercise:  1 day/week    Duration of exercise:  Less than 15 minutes    Do you usually eat at least 4 servings of fruit and vegetables a day, include whole grains    & fiber and avoid regularly eating high fat or \"junk\" foods?  Yes    Taking medications regularly:  Yes    Medication side effects:  None    Ability to successfully perform activities of daily living:  No assistance needed    Home Safety:  No safety concerns identified    Hearing Impairment:  Need to ask people to speak up or repeat themselves    In the past 6 months, have you been bothered by leaking of urine? Yes    In general, how would you rate your overall mental or emotional health?  Good      PHQ-2 Total Score: 0    Additional concerns today:  Yes              Today's PHQ-2 Score:   PHQ-2 ( 1999 [employer]) 4/6/2021   Q1: Little interest or pleasure in doing things 0   Q2: Feeling down, depressed or hopeless 0   PHQ-2 Score 0   Q1: Little interest or pleasure in doing things Not at all   Q2: Feeling down, depressed or hopeless Not at all   PHQ-2 Score 0       Abuse: Current or Past (Physical, Sexual or Emotional) - No  Do you feel safe in your environment? Yes        Social History     Tobacco Use     Smoking status: Never Smoker     Smokeless tobacco: Never Used   Substance Use Topics     Alcohol use: Yes     Comment: 1 drink weekly     If you drink alcohol do you typically have >3 drinks per day or >7 drinks per week? No    Alcohol Use 4/6/2021   Prescreen: >3 drinks/day or >7 drinks/week? No   Prescreen: >3 drinks/day or >7 drinks/week? -       Reviewed orders with patient.  Reviewed health maintenance and updated orders accordingly - Yes  Labs reviewed in Epic    Component     "  Latest Ref Rng & Units 11/19/2019 12/21/2020 3/15/2021   Sodium      133 - 144 mmol/L  137    Potassium      3.4 - 5.3 mmol/L  4.2    Chloride      94 - 109 mmol/L  107    Carbon Dioxide      20 - 32 mmol/L  29    Anion Gap      3 - 14 mmol/L  1 (L)    Glucose      70 - 99 mg/dL  114 (H)    Urea Nitrogen      7 - 30 mg/dL  19    Creatinine      0.52 - 1.04 mg/dL  0.77    GFR Estimate      >60 mL/min/1.73:m2  79    GFR Estimate If Black      >60 mL/min/1.73:m2  >90    Calcium      8.5 - 10.1 mg/dL  8.4 (L)    Bilirubin Total      0.2 - 1.3 mg/dL  0.4    Albumin      3.4 - 5.0 g/dL  3.7    Protein Total      6.8 - 8.8 g/dL  7.5    Alkaline Phosphatase      40 - 150 U/L  67    ALT      0 - 50 U/L  34    AST      0 - 45 U/L  40    Cholesterol      <200 mg/dL  124    Triglycerides      <150 mg/dL  160 (H)    HDL Cholesterol      >49 mg/dL  39 (L)    LDL Cholesterol Calculated      <100 mg/dL  53    Non HDL Cholesterol      <130 mg/dL  85    Creatinine Urine      mg/dL  214    Albumin Urine mg/L      mg/L  50    Albumin Urine mg/g Cr      0 - 25 mg/g Cr  23.32    Hemoglobin A1C      0 - 5.6 % 6.6 (H) 6.4 (H)    Uric Acid      2.6 - 6.0 mg/dL  4.0    TSH      0.40 - 4.00 mU/L  4.33 (H) 2.80   T4 Free      0.76 - 1.46 ng/dL  1.39        Sugars AM and bedtime:  92,97  98,107  114,116  96,127  92,114  94            Breast Cancer Screening:  Any new diagnosis of family breast, ovarian, or bowel cancer? No    FSH-7: No flowsheet data found.    Mammogram Screening: Recommended mammography every 1-2 years with patient discussion and risk factor consideration  Pertinent mammograms are reviewed under the imaging tab.    History of abnormal Pap smear: NO - age 63 - see link Cervical Cytology Screening Guidelines     Reviewed and updated as needed this visit by clinical staff   Allergies               Reviewed and updated as needed this visit by Provider                    Review of Systems  CONSTITUTIONAL: NEGATIVE for fever,  "chills. Weight down 6 pounds  INTEGUMENTARY/SKIN: NEGATIVE for worrisome rashes, moles or lesions  EYES: NEGATIVE for vision changes or irritation. HAs glasses,. Due for eye exam  ENT: NEGATIVE for ear, mouth and throat problems. Occ clear rhinorrhea. Responds to Zyrtec prn   RESP: NEGATIVE for significant cough or SOB. On CPAP for BIJU  BREAST: NEGATIVE for masses, tenderness or discharge  CV: NEGATIVE for chest pain, palpitations or peripheral edema  GI: NEGATIVE for nausea, abdominal pain, heartburn, or change in bowel habits  : NEGATIVE for unusual urinary or vaginal symptoms. No vaginal bleeding.  MUSCULOSKELETAL:  POSITIVE for mld arthritis  Sx at bilateral thumb bases. Hx osteoarthritis . 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 Occ muscle cramps in foot/calf at night. Not with exertion.  Les than once a month   ENDO:  POSITIVE for DM, lipids and thyroid. Sugars as above  PSYCHIATRIC: NEGATIVE for changes in mood or affect      OBJECTIVE:   /74   Pulse 66   Temp 97.5  F (36.4  C) (Temporal)   Resp 16   Ht 1.676 m (5' 6\")   Wt 80.7 kg (178 lb)   SpO2 99%   BMI 28.73 kg/m    Physical Exam  General appearance -   alert, no distress  Skin - No rashes or lesions.  Head - normocephalic, atraumatic  Eyes - GIRMA, EOMI, fundi exam with nondilated pupils negative.  Ears - External ears normal. Canals clear. TM's normal.  Nose/Sinuses - Nares normal. Septum midline. Mucosa normal. No drainage or sinus tenderness.  Oropharynx - No erythema, no adenopathy, no exudates.  Neck - Supple without adenopathy or thyromegaly. No bruits.  Lungs - Clear to auscultation without wheezes/rhonchi.  Heart - Regular rate and rhythm without murmurs, clicks, or gallops.  Nodes - No supraclavicular, axillary, or inguinal adenopathy palpable.  Breasts - deferred  Abdomen - Abdomen soft, non-tender. BS normal. No masses or hepatosplenomegaly palpable. " No bruits.  Extremities -No cyanosis, clubbing or edema.    Musculoskeletal - Spine ROM normal. Muscular strength intact.  DIP and PIP joints  Hands with osteoarthritis changes  Peripheral pulses - radial=4/4, femoral=4/4, posterior tibial=4/4, dorsalis pedis=4/4,  Neuro - Gait normal. Reflexes normal and symmetric. Sensation grossly WNL.  Genital/Rectal - deferred      ASSESSMENT/PLAN:   1. Encounter for routine adult medical exam with abnormal findings  See plan discussion below for health maintenance due    2. Essential hypertension  Controlled.  To help maintain good metabolism, will reduce atenolol to raise heart rate some  - losartan (COZAAR) 100 MG tablet; Take 1 tab by mouth daily  Dispense: 90 tablet; Refill: 3  - atenolol (TENORMIN) 50 MG tablet; Take 1 tablet (50 mg) by mouth daily  Dispense: 90 tablet; Refill: 3    3. Type 2 diabetes mellitus without complication, without long-term current use of insulin (H)  Controlled.  Continue current therapy.  Labs in 1 week as ordered  - Comprehensive metabolic panel; Future  - Albumin Random Urine Quantitative with Creat Ratio; Future  - FOOT EXAM  - Hemoglobin A1c; Future    4. Chronic gout without tophus, unspecified cause, unspecified site  Currently asymptomatic.  Continue allopurinol.  Labs as ordered  - Comprehensive metabolic panel; Future  - CBC with platelets; Future  - Uric acid; Future    5. Hypothyroidism, unspecified type  Previously controlled.  Continue levothyroxine.  Labs ordered  - TSH with free T4 reflex; Future    6. Hyperlipidemia LDL goal <100  On statin therapy.  Previously controlled except for minimal triglyceride elevation.  Continue current medication.  Fasting labs in 6 months  - Comprehensive metabolic panel; Future  - Lipid panel reflex to direct LDL Fasting; Future    7. Leg cramps  Only in the evening.  Not with exercise.  Labs as ordered rule out nutritional deficiency.  If normal, possible future trial of ropinirole  - Basic  "metabolic panel; Future  - Vitamin D Deficiency; Future  - Ferritin; Future  - Magnesium; Future    8. Need for prophylactic vaccination against Streptococcus pneumoniae (pneumococcus)  - ADMIN MEDICARE: Pneumococcal Vaccine ()  - Pneumococcal vaccine 23 valent PPSV23  (Pneumovax) [11814]    9. Encounter for screening mammogram for breast cancer  - MA Screen Bilateral w/Francisco; Future      Patient has been advised of split billing requirements and indicates understanding: Yes       COUNSELING:  Reviewed preventive health counseling, as reflected in patient instructions    Estimated body mass index is 28.73 kg/m  as calculated from the following:    Height as of this encounter: 1.676 m (5' 6\").    Weight as of this encounter: 80.7 kg (178 lb).        She reports that she has never smoked. She has never used smokeless tobacco.      Counseling Resources:  ATP IV Guidelines  Pooled Cohorts Equation Calculator  Breast Cancer Risk Calculator  BRCA-Related Cancer Risk Assessment: FHS-7 Tool  FRAX Risk Assessment  ICSI Preventive Guidelines  Dietary Guidelines for Americans, 2010  [employer] MyPlate  ASA Prophylaxis  Lung CA Screening      PLAN:  Reduce Atenolol to 50mg tab, 1 tab daily for blood pressure   Continue other meds.  Refills recently addressed  Continue diet/exercise for further weight loss   Call  [phone] or use [facility] to schedule a future lab appointment nonfasting in the next 1 week and   fasting in 6 months.   Vaccinations: Pneumococcal 23 vaccine   Future: Shingrix vaccine coverage for shingles prevention.  This is a 2 shot series done 2-6 months apart. Await 1 month before getting. Schedule nurse only appt for vaccine if insurance says to have done in clinic  Mammogram after 6/2/21  Colonoscopy Nov 2022  Schedule an eye appointment  Pt was informed regarding extra E&M billing for management of new or established medical issues not related to today's wellness visit    Wilmar Boyer MD  [facility] " [facility]

## 2021-04-15 DIAGNOSIS — R25.2 LEG CRAMPS: ICD-10-CM

## 2021-04-15 DIAGNOSIS — E11.9 TYPE 2 DIABETES MELLITUS WITHOUT COMPLICATION, WITHOUT LONG-TERM CURRENT USE OF INSULIN (H): ICD-10-CM

## 2021-04-15 LAB
ANION GAP SERPL CALCULATED.3IONS-SCNC: <1 MMOL/L (ref 3–14)
BUN SERPL-MCNC: 21 MG/DL (ref 7–30)
CALCIUM SERPL-MCNC: 9 MG/DL (ref 8.5–10.1)
CHLORIDE SERPL-SCNC: 107 MMOL/L (ref 94–109)
CO2 SERPL-SCNC: 32 MMOL/L (ref 20–32)
CREAT SERPL-MCNC: 0.88 MG/DL (ref 0.52–1.04)
DEPRECATED CALCIDIOL+CALCIFEROL SERPL-MC: 32 UG/L (ref 20–75)
FERRITIN SERPL-MCNC: 15 NG/ML (ref 8–252)
GFR SERPL CREATININE-BSD FRML MDRD: 68 ML/MIN/{1.73_M2}
GLUCOSE SERPL-MCNC: 113 MG/DL (ref 70–99)
HBA1C MFR BLD: 6.4 % (ref 0–5.6)
MAGNESIUM SERPL-MCNC: 1.6 MG/DL (ref 1.6–2.3)
POTASSIUM SERPL-SCNC: 4.4 MMOL/L (ref 3.4–5.3)
SODIUM SERPL-SCNC: 138 MMOL/L (ref 133–144)

## 2021-04-15 PROCEDURE — 83735 ASSAY OF MAGNESIUM: CPT | Performed by: INTERNAL MEDICINE

## 2021-04-15 PROCEDURE — 80048 BASIC METABOLIC PNL TOTAL CA: CPT | Performed by: INTERNAL MEDICINE

## 2021-04-15 PROCEDURE — 82728 ASSAY OF FERRITIN: CPT | Performed by: INTERNAL MEDICINE

## 2021-04-15 PROCEDURE — 36415 COLL VENOUS BLD VENIPUNCTURE: CPT | Performed by: INTERNAL MEDICINE

## 2021-04-15 PROCEDURE — 83036 HEMOGLOBIN GLYCOSYLATED A1C: CPT | Performed by: INTERNAL MEDICINE

## 2021-04-15 PROCEDURE — 82306 VITAMIN D 25 HYDROXY: CPT | Performed by: INTERNAL MEDICINE

## 2021-06-03 ENCOUNTER — HOSPITAL ENCOUNTER (OUTPATIENT)
Dept: MAMMOGRAPHY | Facility: CLINIC | Age: 66
Discharge: HOME | End: 2021-06-03
Attending: INTERNAL MEDICINE | Admitting: INTERNAL MEDICINE
Payer: COMMERCIAL

## 2021-06-03 DIAGNOSIS — Z12.31 ENCOUNTER FOR SCREENING MAMMOGRAM FOR BREAST CANCER: ICD-10-CM

## 2021-06-03 PROCEDURE — 77063 BREAST TOMOSYNTHESIS BI: CPT

## 2021-07-09 ENCOUNTER — TRANSFERRED RECORDS (OUTPATIENT)
Dept: HEALTH INFORMATION MANAGEMENT | Facility: CLINIC | Age: 66
End: 2021-07-09

## 2021-07-09 LAB — RETINOPATHY: NEGATIVE

## 2021-10-24 ENCOUNTER — HEALTH MAINTENANCE LETTER (OUTPATIENT)
Age: 66
End: 2021-10-24

## 2022-01-04 ENCOUNTER — TRANSFERRED RECORDS (OUTPATIENT)
Dept: HEALTH INFORMATION MANAGEMENT | Facility: CLINIC | Age: 67
End: 2022-01-04
Payer: COMMERCIAL

## 2022-03-09 ENCOUNTER — TELEPHONE (OUTPATIENT)
Dept: INTERNAL MEDICINE | Facility: CLINIC | Age: 67
End: 2022-03-09
Payer: COMMERCIAL

## 2022-03-09 NOTE — TELEPHONE ENCOUNTER
Reason for Call: Request for an order or referral:    Order or referral being requested: labs    Date needed: before my next appointment    Has the patient been seen by the PCP for this problem? YES    Additional comments: has lab orders that  the beginning of April and she wont be home yet wants new ones ordered so she can schedule before her next appointment    Phone number Patient can be reached at:  Cell number on file:    Telephone Information:   Mobile [phone]       Best Time:  any    Can we leave a detailed message on this number?  YES    Call taken on 3/9/2022 at 12:44 PM by Mery Smart

## 2022-03-10 DIAGNOSIS — E11.9 TYPE 2 DIABETES MELLITUS WITHOUT COMPLICATION, WITHOUT LONG-TERM CURRENT USE OF INSULIN (H): ICD-10-CM

## 2022-03-10 NOTE — TELEPHONE ENCOUNTER
Medication is being filled for 1 time refill only due to:  Patient needs to be seen because it has been more than 6 months . Pt has appt scheduled. Jeanette refill x 1.     Deanna Garcia RN

## 2022-03-11 DIAGNOSIS — E11.9 TYPE 2 DIABETES MELLITUS WITHOUT COMPLICATION, WITHOUT LONG-TERM CURRENT USE OF INSULIN (H): ICD-10-CM

## 2022-03-11 RX ORDER — LANCETS
EACH MISCELLANEOUS
Qty: 100 EACH | Refills: 0 | Status: SHIPPED | OUTPATIENT
Start: 2022-03-11

## 2022-03-17 NOTE — TELEPHONE ENCOUNTER
Has appointment scheduled with me for 4/29/2022.  Future lab orders updated.  Assist patient in scheduling a fasting blood and urine lab appointment between now and when she sees me in late April.  Also instruct patient to check blood sugars twice a day (before breakfast and bedtime) for 4 days prior to the appointment with me, write them down and have them available to review with the appointment

## 2022-03-21 NOTE — TELEPHONE ENCOUNTER
Patient is in [place] on Vacation-will call back to scheduled fasting labs appointment 1 week prior to OV with Veum.

## 2022-04-20 ENCOUNTER — LAB (OUTPATIENT)
Dept: LAB | Facility: CLINIC | Age: 67
End: 2022-04-20
Payer: COMMERCIAL

## 2022-04-20 DIAGNOSIS — E78.5 HYPERLIPIDEMIA LDL GOAL <100: ICD-10-CM

## 2022-04-20 DIAGNOSIS — E11.9 TYPE 2 DIABETES MELLITUS WITHOUT COMPLICATION, WITHOUT LONG-TERM CURRENT USE OF INSULIN (H): ICD-10-CM

## 2022-04-20 DIAGNOSIS — E03.9 HYPOTHYROIDISM, UNSPECIFIED TYPE: ICD-10-CM

## 2022-04-20 DIAGNOSIS — M1A.9XX0 CHRONIC GOUT WITHOUT TOPHUS, UNSPECIFIED CAUSE, UNSPECIFIED SITE: ICD-10-CM

## 2022-04-20 LAB
ALBUMIN SERPL-MCNC: 3.8 G/DL (ref 3.4–5)
ALP SERPL-CCNC: 58 U/L (ref 40–150)
ALT SERPL W P-5'-P-CCNC: 34 U/L (ref 0–50)
ANION GAP SERPL CALCULATED.3IONS-SCNC: 4 MMOL/L (ref 3–14)
AST SERPL W P-5'-P-CCNC: 44 U/L (ref 0–45)
BILIRUB SERPL-MCNC: 0.5 MG/DL (ref 0.2–1.3)
BUN SERPL-MCNC: 20 MG/DL (ref 7–30)
CALCIUM SERPL-MCNC: 9.2 MG/DL (ref 8.5–10.1)
CHLORIDE BLD-SCNC: 105 MMOL/L (ref 94–109)
CHOLEST SERPL-MCNC: 152 MG/DL
CO2 SERPL-SCNC: 27 MMOL/L (ref 20–32)
CREAT SERPL-MCNC: 0.8 MG/DL (ref 0.52–1.04)
CREAT UR-MCNC: 112 MG/DL
ERYTHROCYTE [DISTWIDTH] IN BLOOD BY AUTOMATED COUNT: 13.6 % (ref 10–15)
FASTING STATUS PATIENT QL REPORTED: YES
GFR SERPL CREATININE-BSD FRML MDRD: 80 ML/MIN/1.73M2
GLUCOSE BLD-MCNC: 110 MG/DL (ref 70–99)
HBA1C MFR BLD: 6.2 % (ref 0–5.6)
HCT VFR BLD AUTO: 39.7 % (ref 35–47)
HDLC SERPL-MCNC: 43 MG/DL
HGB BLD-MCNC: 12.4 G/DL (ref 11.7–15.7)
LDLC SERPL CALC-MCNC: 80 MG/DL
MCH RBC QN AUTO: 28.4 PG (ref 26.5–33)
MCHC RBC AUTO-ENTMCNC: 31.2 G/DL (ref 31.5–36.5)
MCV RBC AUTO: 91 FL (ref 78–100)
MICROALBUMIN UR-MCNC: 22 MG/L
MICROALBUMIN/CREAT UR: 19.64 MG/G CR (ref 0–25)
NONHDLC SERPL-MCNC: 109 MG/DL
PLATELET # BLD AUTO: 365 10E3/UL (ref 150–450)
POTASSIUM BLD-SCNC: 4.1 MMOL/L (ref 3.4–5.3)
PROT SERPL-MCNC: 7.5 G/DL (ref 6.8–8.8)
RBC # BLD AUTO: 4.36 10E6/UL (ref 3.8–5.2)
SODIUM SERPL-SCNC: 136 MMOL/L (ref 133–144)
T4 FREE SERPL-MCNC: 1.38 NG/DL (ref 0.76–1.46)
TRIGL SERPL-MCNC: 145 MG/DL
TSH SERPL DL<=0.005 MIU/L-ACNC: 4.19 MU/L (ref 0.4–4)
URATE SERPL-MCNC: 4.2 MG/DL (ref 2.6–6)
WBC # BLD AUTO: 8.1 10E3/UL (ref 4–11)

## 2022-04-20 PROCEDURE — 80053 COMPREHEN METABOLIC PANEL: CPT

## 2022-04-20 PROCEDURE — 84439 ASSAY OF FREE THYROXINE: CPT

## 2022-04-20 PROCEDURE — 80061 LIPID PANEL: CPT

## 2022-04-20 PROCEDURE — 36415 COLL VENOUS BLD VENIPUNCTURE: CPT

## 2022-04-20 PROCEDURE — 82043 UR ALBUMIN QUANTITATIVE: CPT

## 2022-04-20 PROCEDURE — 84550 ASSAY OF BLOOD/URIC ACID: CPT

## 2022-04-20 PROCEDURE — 83036 HEMOGLOBIN GLYCOSYLATED A1C: CPT

## 2022-04-20 PROCEDURE — 84443 ASSAY THYROID STIM HORMONE: CPT

## 2022-04-20 PROCEDURE — 85027 COMPLETE CBC AUTOMATED: CPT

## 2022-04-27 DIAGNOSIS — E03.9 HYPOTHYROIDISM, UNSPECIFIED TYPE: ICD-10-CM

## 2022-04-29 ENCOUNTER — OFFICE VISIT (OUTPATIENT)
Dept: INTERNAL MEDICINE | Facility: CLINIC | Age: 67
End: 2022-04-29
Payer: COMMERCIAL

## 2022-04-29 VITALS
TEMPERATURE: 97.4 F | HEIGHT: 66 IN | RESPIRATION RATE: 16 BRPM | SYSTOLIC BLOOD PRESSURE: 128 MMHG | HEART RATE: 78 BPM | BODY MASS INDEX: 27.48 KG/M2 | OXYGEN SATURATION: 98 % | WEIGHT: 171 LBS | DIASTOLIC BLOOD PRESSURE: 76 MMHG

## 2022-04-29 DIAGNOSIS — M1A.9XX0 CHRONIC GOUT WITHOUT TOPHUS, UNSPECIFIED CAUSE, UNSPECIFIED SITE: ICD-10-CM

## 2022-04-29 DIAGNOSIS — E78.5 HYPERLIPIDEMIA LDL GOAL <100: ICD-10-CM

## 2022-04-29 DIAGNOSIS — G47.33 OSA ON CPAP: ICD-10-CM

## 2022-04-29 DIAGNOSIS — E11.9 TYPE 2 DIABETES MELLITUS WITHOUT COMPLICATION, WITHOUT LONG-TERM CURRENT USE OF INSULIN (H): ICD-10-CM

## 2022-04-29 DIAGNOSIS — E03.9 HYPOTHYROIDISM, UNSPECIFIED TYPE: ICD-10-CM

## 2022-04-29 DIAGNOSIS — Z00.01 ENCOUNTER FOR ROUTINE ADULT MEDICAL EXAM WITH ABNORMAL FINDINGS: ICD-10-CM

## 2022-04-29 DIAGNOSIS — Z23 HIGH PRIORITY FOR 2019-NCOV VACCINE: ICD-10-CM

## 2022-04-29 DIAGNOSIS — I10 ESSENTIAL HYPERTENSION: ICD-10-CM

## 2022-04-29 DIAGNOSIS — Z12.31 ENCOUNTER FOR SCREENING MAMMOGRAM FOR BREAST CANCER: ICD-10-CM

## 2022-04-29 PROCEDURE — 99397 PER PM REEVAL EST PAT 65+ YR: CPT | Mod: 25 | Performed by: INTERNAL MEDICINE

## 2022-04-29 PROCEDURE — 99207 PR FOOT EXAM NO CHARGE: CPT | Mod: 25 | Performed by: INTERNAL MEDICINE

## 2022-04-29 PROCEDURE — 99214 OFFICE O/P EST MOD 30 MIN: CPT | Mod: 25 | Performed by: INTERNAL MEDICINE

## 2022-04-29 PROCEDURE — 0064A COVID-19,PF,MODERNA (18+ YRS BOOSTER .25ML): CPT | Performed by: INTERNAL MEDICINE

## 2022-04-29 PROCEDURE — 91306 COVID-19,PF,MODERNA (18+ YRS BOOSTER .25ML): CPT | Performed by: INTERNAL MEDICINE

## 2022-04-29 RX ORDER — ATENOLOL 50 MG/1
50 TABLET ORAL DAILY
Qty: 90 TABLET | Refills: 3 | Status: SHIPPED | OUTPATIENT
Start: 2022-04-29 | End: 2023-05-25

## 2022-04-29 RX ORDER — ALLOPURINOL 300 MG/1
1 TABLET ORAL DAILY
Qty: 90 TABLET | Refills: 3 | Status: SHIPPED | OUTPATIENT
Start: 2022-04-29 | End: 2023-05-26

## 2022-04-29 RX ORDER — ATORVASTATIN CALCIUM 20 MG/1
20 TABLET, FILM COATED ORAL DAILY
Qty: 90 TABLET | Refills: 3 | Status: SHIPPED | OUTPATIENT
Start: 2022-04-29 | End: 2023-05-25

## 2022-04-29 RX ORDER — AMLODIPINE BESYLATE 5 MG/1
5 TABLET ORAL DAILY
Qty: 90 TABLET | Refills: 3 | Status: SHIPPED | OUTPATIENT
Start: 2022-04-29 | End: 2023-05-25

## 2022-04-29 RX ORDER — LOSARTAN POTASSIUM 100 MG/1
TABLET ORAL
Qty: 90 TABLET | Refills: 3 | Status: SHIPPED | OUTPATIENT
Start: 2022-04-29 | End: 2023-05-25

## 2022-04-29 RX ORDER — LEVOTHYROXINE SODIUM 88 UG/1
88 TABLET ORAL DAILY
Qty: 90 TABLET | Refills: 3 | Status: SHIPPED | OUTPATIENT
Start: 2022-04-29 | End: 2023-04-06

## 2022-04-29 RX ORDER — HYDROCHLOROTHIAZIDE 25 MG/1
25 TABLET ORAL EVERY MORNING
Qty: 90 TABLET | Refills: 3 | Status: SHIPPED | OUTPATIENT
Start: 2022-04-29 | End: 2023-05-25

## 2022-04-29 ASSESSMENT — ENCOUNTER SYMPTOMS
FEVER: 0
HEMATURIA: 0
SHORTNESS OF BREATH: 0
COUGH: 0
HEMATOCHEZIA: 0
PALPITATIONS: 0
PARESTHESIAS: 0
MYALGIAS: 0
FREQUENCY: 0
DYSURIA: 0
ARTHRALGIAS: 0
EYE PAIN: 0
DIZZINESS: 0
ABDOMINAL PAIN: 0
NERVOUS/ANXIOUS: 0
CONSTIPATION: 0
BREAST MASS: 0
NAUSEA: 0
SORE THROAT: 0
WEAKNESS: 0
HEARTBURN: 0
CHILLS: 0
DIARRHEA: 0
JOINT SWELLING: 0
HEADACHES: 0

## 2022-04-29 ASSESSMENT — ACTIVITIES OF DAILY LIVING (ADL): CURRENT_FUNCTION: NO ASSISTANCE NEEDED

## 2022-04-29 NOTE — TELEPHONE ENCOUNTER
Routing refill request to provider for review/approval because:  TSH   Date Value Ref Range Status   04/20/2022 4.19 (H) 0.40 - 4.00 mU/L Final   03/15/2021 2.80 0.40 - 4.00 mU/L Final     Appointment today     Víctor Brown, RN  [facility] Triage Nurse

## 2022-04-29 NOTE — PROGRESS NOTES
"SUBJECTIVE:   Jenna Card is a 66 year old female who presents for Preventive Visit and follow-up regarding multiple established problems including diabetes, hypertension, hyperlipidemia, hypothyroidism, etc.      Patient has been advised of split billing requirements and indicates understanding: Yes  Are you in the first 12 months of your Medicare coverage?  No    Healthy Habits:     In general, how would you rate your overall health?  Good    Frequency of exercise:  1 day/week    Duration of exercise:  Less than 15 minutes    Do you usually eat at least 4 servings of fruit and vegetables a day, include whole grains    & fiber and avoid regularly eating high fat or \"junk\" foods?  No    Taking medications regularly:  Yes    Medication side effects:  None    Ability to successfully perform activities of daily living:  No assistance needed    Home Safety:  No safety concerns identified    Hearing Impairment:  Need to ask people to speak up or repeat themselves and difficulty understanding soft or whispered speech    In the past 6 months, have you been bothered by leaking of urine?  No    In general, how would you rate your overall mental or emotional health?  Good      PHQ-2 Total Score: 0    Additional concerns today:  No    Do you feel safe in your environment? Yes    Have you ever done Advance Care Planning? (For example, a Health Directive, POLST, or a discussion with a medical provider or your loved ones about your wishes): No, advance care planning information given to patient to review.  Advanced care planning was discussed at today's visit.       Fall risk  Fallen 2 or more times in the past year?: No  Any fall with injury in the past year?: No    Cognitive Screening   1) Repeat 3 items (Leader, Season, Table)    2) Clock draw: NORMAL  3) 3 item recall: Recalls 3 objects  Results: Normal Clock-3 items recalled    Mini-CogTM Copyright S Geraldine. Licensed by the author for use in [facility] " Services; reprinted with permission (soob@[facility]). All rights reserved.      Do you have sleep apnea, excessive snoring or daytime drowsiness?: yes    Reviewed and updated as needed this visit by clinical staff                    Reviewed and updated as needed this visit by Provider                   Social History     Tobacco Use     Smoking status: Never Smoker     Smokeless tobacco: Never Used   Substance Use Topics     Alcohol use: Yes     Comment: 1 drink weekly     If you drink alcohol do you typically have >3 drinks per day or >7 drinks per week? No    Alcohol Use 4/29/2022   Prescreen: >3 drinks/day or >7 drinks/week? No   Prescreen: >3 drinks/day or >7 drinks/week? -         Current providers sharing in care for this patient include:   Patient Care Team:  Wilmar Boyer MD as PCP - General (Internal Medicine)  Wilmar Boyer MD as Assigned PCP    The following health maintenance items are reviewed in Epic and correct as of today:  Health Maintenance Due   Topic Date Due     DEXA  Never done     ANNUAL REVIEW OF HM ORDERS  Never done     DIABETIC FOOT EXAM  04/08/2022     FALL RISK ASSESSMENT  04/08/2022     MEDICARE ANNUAL WELLNESS VISIT  04/08/2022     Labs reviewed in EPIC    Component      Latest Ref Rng & Units 12/21/2020 3/15/2021 4/15/2021 4/20/2022   Sodium      133 - 144 mmol/L 137  138 136   Potassium      3.4 - 5.3 mmol/L 4.2  4.4 4.1   Chloride      94 - 109 mmol/L 107  107 105   Carbon Dioxide      20 - 32 mmol/L 29  32 27   Anion Gap      3 - 14 mmol/L 1 (L)  <1 (L) 4   Glucose      70 - 99 mg/dL 114 (H)  113 (H) 110 (H)   Urea Nitrogen      7 - 30 mg/dL 19  21 20   Creatinine      0.52 - 1.04 mg/dL 0.77  0.88 0.80   GFR Estimate      >60 mL/min/1.73m2 79  68 80   GFR Estimate If Black      >60 mL/min/1.73:m2 >90  79    Calcium      8.5 - 10.1 mg/dL 8.4 (L)  9.0 9.2   Bilirubin Total      0.2 - 1.3 mg/dL 0.4   0.5   Albumin      3.4 - 5.0 g/dL 3.7   3.8   Protein Total      6.8 - 8.8 g/dL 7.5    7.5   Alkaline Phosphatase      40 - 150 U/L 67      ALT      0 - 50 U/L 34   34   AST      0 - 45 U/L 40   44   Alkaline Phosphatase      40 - 150 U/L    58   WBC      4.0 - 11.0 10e3/uL    8.1   RBC Count      3.80 - 5.20 10e6/uL    4.36   Hemoglobin      11.7 - 15.7 g/dL    12.4   Hematocrit      35.0 - 47.0 %    39.7   MCV      78 - 100 fL    91   MCH      26.5 - 33.0 pg    28.4   MCHC      31.5 - 36.5 g/dL    31.2 (L)   RDW      10.0 - 15.0 %    13.6   Platelet Count      150 - 450 10e3/uL    365   Cholesterol      <200 mg/dL 124   152   Triglycerides      <150 mg/dL 160 (H)   145   HDL Cholesterol      >=50 mg/dL 39 (L)   43 (L)   LDL Cholesterol Calculated      <=100 mg/dL 53   80   Non HDL Cholesterol      <130 mg/dL 85   109   Patient Fasting > 8hrs?          Yes   Creatinine Urine      mg/dL 214   112   Albumin Urine mg/L      mg/L 50   22   Albumin Urine mg/g Cr      0.00 - 25.00 mg/g Cr 23.32   19.64   Hemoglobin A1C POCT      0 - 5.6 % 6.4 (H)  6.4 (H)    T4 Free      0.76 - 1.46 ng/dL 1.39   1.38   TSH      0.40 - 4.00 mU/L  2.80  4.19 (H)   Magnesium      1.6 - 2.3 mg/dL   1.6    Ferritin      8 - 252 ng/mL   15    Vitamin D Deficiency screening      20 - 75 ug/L   32    Uric Acid      2.6 - 6.0 mg/dL    4.2   Hemoglobin A1C      0.0 - 5.6 %    6.2 (H)      Sugars:  106,81  85,113  107,89  91,98  111,117    FHS-7:   Breast CA Risk Assessment (FHS-7) 4/29/2022   Did any of your first-degree relatives have breast or ovarian cancer? No   Did any of your relatives have bilateral breast cancer? No   Did any man in your family have breast cancer? No   Did any woman in your family have breast and ovarian cancer? Yes   Did any woman in your family have breast cancer before age 50 y? Unknown   Do you have 2 or more relatives with breast and/or ovarian cancer? No   Do you have 2 or more relatives with breast and/or bowel cancer? No       Mammogram Screening: Recommended mammography every 1-2 years with patient  "discussion and risk factor consideration  Pertinent mammograms are reviewed under the imaging tab.    Review of Systems   Constitutional: Negative for chills and fever.   HENT: Negative for congestion, ear pain, hearing loss and sore throat.    Eyes: Negative for pain and visual disturbance.   Respiratory: Negative for cough and shortness of breath.    Cardiovascular: Negative for chest pain, palpitations and peripheral edema.   Gastrointestinal: Negative for abdominal pain, constipation, diarrhea, heartburn, hematochezia and nausea.   Breasts:  Negative for tenderness, breast mass and discharge.   Genitourinary: Negative for dysuria, frequency, genital sores, hematuria, pelvic pain, urgency, vaginal bleeding and vaginal discharge.   Musculoskeletal: Negative for arthralgias, joint swelling and myalgias.   Skin: Negative for rash.   Neurological: Negative for dizziness, weakness, headaches and paresthesias.   Psychiatric/Behavioral: Negative for mood changes. The patient is not nervous/anxious.      Weight down 7 pounds in 1 year  Eye exam in June. [facility]   Has some arthritis left knee. Not limiting activity. 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    Weight as of this encounter: 77.6 kg (171 lb).  Physical Exam  General appearance - healthy, alert, no distress  Skin - No rashes or lesions.  Scar left hand from prior skin excision  Head - normocephalic, atraumatic  Eyes - GIRMA, EOMI, fundi exam with nondilated pupils negative.  Ears - External ears normal. Canals clear. TM's normal.  Nose/Sinuses - Nares normal. Septum midline. Mucosa normal. No drainage or sinus tenderness.  Oropharynx - No " erythema, no adenopathy, no exudates.  Neck - Supple without adenopathy or thyromegaly. No bruits.  Lungs - Clear to auscultation without wheezes/rhonchi.  Heart - Regular rate and rhythm without murmurs, clicks, or gallops.  Nodes - No supraclavicular, axillary, or inguinal adenopathy palpable.  Breasts - deferred  Abdomen - Abdomen soft, non-tender. BS normal. No masses or hepatosplenomegaly palpable. No bruits.  Extremities -No cyanosis, clubbing or edema.    Musculoskeletal - Spine ROM normal. Muscular strength intact.  Mild osteoarthritis changes DIP and PI joints hands bilaterally  Peripheral pulses - radial=4/4, femoral=4/4, posterior tibial=4/4, dorsalis pedis=4/4,  Neuro - Gait normal. Reflexes normal and symmetric. Sensation grossly WNL.  Genital/Rectal - deferred         ASSESSMENT / PLAN:   1. Encounter for routine adult medical exam with abnormal findings  Colonoscopy due later in November 2022.  Mammogram due later in June.  COVID-vaccine as below.  Other healthcare maintenance up-to-date    2. Type 2 diabetes mellitus without complication, without long-term current use of insulin (H)  Controlled.  Continue current medication.  Recheck A1c 6 months  - Hemoglobin A1c; Future  - Albumin Random Urine Quantitative with Creat Ratio; Future  - FOOT EXAM  - blood glucose (NO BRAND SPECIFIED) test strip; Use to test blood sugar 1 time daily or as directed.  Dispense: 100 strip; Refill: 3  - metFORMIN (GLUCOPHAGE) 1000 MG tablet; TAKE ONE TABLET BY MOUTH TWICE DAILY WITH MEALS.  Dispense: 180 tablet; Refill: 3    3. Hypothyroidism, unspecified type  TSH minimally elevated with normal free T4.  Continue current levothyroxine dose and recheck  - TSH; Future  - T3 Free; Future  - T4 free; Future  - levothyroxine (SYNTHROID/LEVOTHROID) 88 MCG tablet; Take 1 tablet (88 mcg) by mouth daily  Dispense: 90 tablet; Refill: 3    4. Hyperlipidemia LDL goal <100  Controlled.  Continue statin therapy.  Repeat lipids 1  "year  - Lipid panel reflex to direct LDL Fasting; Future  - Comprehensive metabolic panel; Future  - atorvastatin (LIPITOR) 20 MG tablet; Take 1 tablet (20 mg) by mouth daily  Dispense: 90 tablet; Refill: 3    5. Chronic gout without tophus, unspecified cause, unspecified site  No recent gout flares.  Uric acid at goal.  Continue current medication  - allopurinol (ZYLOPRIM) 300 MG tablet; Take 1 tablet (300 mg) by mouth daily  Dispense: 90 tablet; Refill: 3    6. Essential hypertension  Controlled.  Continue current medication.  Future labs ordered  - amLODIPine (NORVASC) 5 MG tablet; Take 1 tablet (5 mg) by mouth daily  Dispense: 90 tablet; Refill: 3  - atenolol (TENORMIN) 50 MG tablet; Take 1 tablet (50 mg) by mouth daily  Dispense: 90 tablet; Refill: 3  - hydrochlorothiazide (HYDRODIURIL) 25 MG tablet; Take 1 tablet (25 mg) by mouth every morning  Dispense: 90 tablet; Refill: 3  - losartan (COZAAR) 100 MG tablet; Take 1 tab by mouth daily  Dispense: 90 tablet; Refill: 3    7. BIJU on CPAP  Good energy.  Compliant with CPAP use per patient    8. Encounter for screening mammogram for breast cancer  Due for mammogram later in June 2022  - *MA Screening Digital Bilateral; Future    9. High priority for 2019-nCoV vaccine  Candidate for booster vaccination  - COVID-19,PF,MODERNA (18+ Yrs BOOSTER .25mL)      Patient has been advised of split billing requirements and indicates understanding: Yes    COUNSELING:  Reviewed preventive health counseling, as reflected in patient instructions    Estimated body mass index is 27.6 kg/m  as calculated from the following:    Height as of this encounter: 1.676 m (5' 6\").    Weight as of this encounter: 77.6 kg (171 lb).        She reports that she has never smoked. She has never used smokeless tobacco.      Appropriate preventive services were discussed with this patient, including applicable screening as appropriate for cardiovascular disease, diabetes, osteopenia/osteoporosis, and " glaucoma.  As appropriate for age/gender, discussed screening for colorectal cancer, prostate cancer, breast cancer, and cervical cancer. Checklist reviewing preventive services available has been given to the patient.    Reviewed patients plan of care and provided an AVS. The Basic Care Plan (routine screening as documented in Health Maintenance) for Jenna meets the Care Plan requirement. This Care Plan has been established and reviewed with the Patient.    Counseling Resources:  ATP IV Guidelines  Pooled Cohorts Equation Calculator  Breast Cancer Risk Calculator  Breast Cancer: Medication to Reduce Risk  FRAX Risk Assessment  ICSI Preventive Guidelines  Dietary Guidelines for Americans, 2010  Topple Track's MyPlate  ASA Prophylaxis  Lung CA Screening      PLAN:  Continue current meds  Prescriptions refilled on file    Call  [phone] or use [email or web address] to schedule a future lab appointment  non-fasting in 3 months for thyroid and A1C in 6 months for diabetes.   Mammogram    6/4/22 or later  Vaccinations: Moderna covid vaccine  Schedule an eye exam appointment in July  Colonoscopy with [facility] in November. Will send note in mid October with reminder  Pt was informed regarding extra E&M billing for management of new or established medical issues not related to today's wellness visit    Wilmar Boyer MD  [facility]

## 2022-04-29 NOTE — PATIENT INSTRUCTIONS
Continue current meds  Prescriptions refilled on file    Call  [phone] or use [email or web address] to schedule a future lab appointment  non-fasting in 3 months for thyroid and A1C in 6 months for diabetes.   Mammogram    6/4/22 or later  Vaccinations: Moderna covid vaccine  Schedule an eye exam appointment in July  Colonoscopy with [facility] in November. Will send note in mid October with reminder  Pt was informed regarding extra E&M billing for management of new or established medical issues not related to today's wellness visit

## 2022-04-30 RX ORDER — LEVOTHYROXINE SODIUM 88 UG/1
88 TABLET ORAL DAILY
Qty: 90 TABLET | Refills: 3 | Status: SHIPPED | OUTPATIENT
Start: 2022-04-30 | End: 2023-05-25

## 2022-05-03 NOTE — TELEPHONE ENCOUNTER
Call patient.  Insurance letter states patient will now have to use    Contour Next brand  of glucometer and test strips. Will no longer cover Accu Chek Landy Plus glucometer.  Prescription for new glucometer, test strips and calibrating solution sent to patient's pharmacy.  Inform patient   History  Chief Complaint   Patient presents with    Cough     Patient's mother states patient was sent by  due to cough and runny cyndi  Mother also states cough and runny nose started 3 days ago  Patient acting appropriately at this time  33 year old otherwise healthy male presents with mom for evaluation of runny nose and cough  Symptoms started x 3 days ago  Reports runny nose, congestion and cough  No fevers reported  No wheezing or SOB  No vomiting, diarrhea  No rashes  Child is eating, drinking and behaving appropriately  Mom notes appetite slightly decreased  Urine output has been normal   No reported aggravating or alleviating factors  No specific treatments tried  PMH unremarkable  Pediatric immunizations reported to be UTD  Grandmother is reported to be sick with a cold  Mom notes  told her to have him evaluated  No known exposure to covid  History provided by: Mother  History limited by:  Age   used: No    Cough  Duration:  3 days  Timing:  Intermittent  Chronicity:  New  Context: sick contacts and upper respiratory infection    Relieved by:  None tried  Worsened by:  Nothing  Ineffective treatments:  None tried  Associated symptoms: rhinorrhea and sinus congestion    Associated symptoms: no ear pain, no fever, no rash, no shortness of breath and no wheezing    Behavior:     Behavior:  Normal    Intake amount:  Eating less than usual    Urine output:  Normal    Last void:  Less than 6 hours ago  Risk factors: no recent infection and no recent travel        None       History reviewed  No pertinent past medical history  History reviewed  No pertinent surgical history  History reviewed  No pertinent family history  I have reviewed and agree with the history as documented      E-Cigarette/Vaping     E-Cigarette/Vaping Substances     Social History     Tobacco Use    Smoking status: Never Smoker    Smokeless tobacco: Never Used   Substance Use Topics    Alcohol use: Not on file    Drug use: Not on file       Review of Systems   Unable to perform ROS: Age   Constitutional: Negative  Negative for activity change and fever  HENT: Positive for congestion and rhinorrhea  Negative for ear pain  Eyes: Negative  Negative for redness  Respiratory: Positive for cough  Negative for shortness of breath and wheezing  Cardiovascular: Negative  Gastrointestinal: Negative  Negative for diarrhea and vomiting  Genitourinary: Negative  Negative for decreased urine volume  Skin: Negative for rash  Neurological: Negative  All other systems reviewed and are negative  Physical Exam  Physical Exam  Vitals and nursing note reviewed  Constitutional:       General: He is active and playful  He is not in acute distress  Appearance: He is well-developed  He is not toxic-appearing  HENT:      Head: Normocephalic and atraumatic  Right Ear: Hearing, tympanic membrane, ear canal and external ear normal       Left Ear: Hearing, tympanic membrane, ear canal and external ear normal       Nose: Congestion present  Mouth/Throat:      Mouth: Mucous membranes are moist  No oral lesions  Pharynx: Oropharynx is clear  Uvula midline  Eyes:      General: Visual tracking is normal  Lids are normal       Conjunctiva/sclera: Conjunctivae normal    Neck:      Trachea: Trachea normal    Cardiovascular:      Rate and Rhythm: Normal rate and regular rhythm  Pulses: Normal pulses  Heart sounds: Normal heart sounds, S1 normal and S2 normal  No murmur heard  Pulmonary:      Effort: Pulmonary effort is normal  No tachypnea, accessory muscle usage or respiratory distress  Breath sounds: Normal breath sounds and air entry  No stridor  No wheezing or rhonchi  Abdominal:      Palpations: Abdomen is not rigid  Skin:     General: Skin is warm and moist       Capillary Refill: Capillary refill takes less than 2 seconds  Findings: No rash  Neurological:      Mental Status: He is alert and oriented for age  Gait: Gait normal    Psychiatric:         Behavior: Behavior normal  Behavior is cooperative  Vital Signs  ED Triage Vitals [05/03/22 1754]   Temperature Pulse Respirations Blood Pressure SpO2   98 7 °F (37 1 °C) (!) 128 24 (!) 115/67 96 %      Temp src Heart Rate Source Patient Position - Orthostatic VS BP Location FiO2 (%)   Temporal Monitor Sitting Left arm --      Pain Score       No Pain           Vitals:    05/03/22 1754   BP: (!) 115/67   Pulse: (!) 128   Patient Position - Orthostatic VS: Sitting         Visual Acuity      ED Medications  Medications - No data to display    Diagnostic Studies  Results Reviewed     Procedure Component Value Units Date/Time    COVID/FLU/RSV - 2 hour TAT [[id number]] Collected: 05/03/22 1804    Lab Status: In process Specimen: Nasopharyngeal from Nose Updated: 05/03/22 1808                 No orders to display              Procedures  Procedures         ED Course         Child afebrile, hemodynamically stable  Lungs are clear with normal O2 saturations  Discussed with mom symptoms likely viral in nature  Will swab for covid/flu/rsv and discharge with symptomatic management  Discussed continued symptomatic/supportive care  Advised rest, fluids, OTC meds as needed for symptoms  Strict return precautions outlined  Advised outpatient follow up with PCP in 3-5 days if not improving or return to ER for change in condition as outlined  Pt's mom verbalized understanding and had no further questions                                      MDM  Number of Diagnoses or Management Options  Viral URI with cough: new and requires workup     Amount and/or Complexity of Data Reviewed  Clinical lab tests: ordered  Decide to obtain previous medical records or to obtain history from someone other than the patient: yes  Obtain history from someone other than the patient: yes  Review and summarize past medical records: yes    Patient Progress  Patient progress: improved      Disposition  Final diagnoses:   Viral URI with cough     Time reflects when diagnosis was documented in both MDM as applicable and the Disposition within this note     Time User Action Codes Description Comment    5/3/2022  6:04 PM Johnna Calhoun Add [J06 9] Viral URI with cough       ED Disposition     ED Disposition Condition Date/Time Comment    Discharge Stable Tue May 3, 2022  6:04 PM Isaias Victoria  discharge to home/self care  Follow-up Information     Follow up With Specialties Details Why Contact Info Additional Information    [facility] [place] [facility]  As needed [id number]  [facility] Emergency Department, [address], [address]          Discharge Medication List as of 5/3/2022  6:08 PM      START taking these medications    Details   cetirizine (ZyrTEC) oral solution Take 2 5 mL (2 5 mg total) by mouth daily, Starting Tue 5/3/2022, Print             No discharge procedures on file      PDMP Review     None          ED Provider  Electronically Signed by           Marvin Waterman PA-C  05/03/22 0127

## 2022-06-09 ENCOUNTER — HOSPITAL ENCOUNTER (OUTPATIENT)
Dept: MAMMOGRAPHY | Facility: CLINIC | Age: 67
Discharge: HOME | End: 2022-06-09
Attending: INTERNAL MEDICINE | Admitting: INTERNAL MEDICINE
Payer: COMMERCIAL

## 2022-06-09 DIAGNOSIS — Z12.31 ENCOUNTER FOR SCREENING MAMMOGRAM FOR BREAST CANCER: ICD-10-CM

## 2022-06-09 PROCEDURE — 77067 SCR MAMMO BI INCL CAD: CPT

## 2022-07-14 ENCOUNTER — ANCILLARY PROCEDURE (OUTPATIENT)
Dept: BONE DENSITY | Facility: CLINIC | Age: 67
End: 2022-07-14
Attending: OBSTETRICS & GYNECOLOGY
Payer: COMMERCIAL

## 2022-07-14 DIAGNOSIS — Z13.9 ENCOUNTER FOR SCREENING, UNSPECIFIED: ICD-10-CM

## 2022-07-14 PROCEDURE — 77080 DXA BONE DENSITY AXIAL: CPT | Performed by: INTERNAL MEDICINE

## 2022-08-17 ENCOUNTER — LAB REQUISITION (OUTPATIENT)
Dept: LAB | Facility: CLINIC | Age: 67
End: 2022-08-17

## 2022-08-17 LAB — SARS-COV-2 RNA RESP QL NAA+PROBE: POSITIVE

## 2022-08-17 PROCEDURE — U0003 INFECTIOUS AGENT DETECTION BY NUCLEIC ACID (DNA OR RNA); SEVERE ACUTE RESPIRATORY SYNDROME CORONAVIRUS 2 (SARS-COV-2) (CORONAVIRUS DISEASE [COVID-19]), AMPLIFIED PROBE TECHNIQUE, MAKING USE OF HIGH THROUGHPUT TECHNOLOGIES AS DESCRIBED BY CMS-2020-01-R: HCPCS | Performed by: INTERNAL MEDICINE

## 2022-08-19 DIAGNOSIS — J06.9 UPPER RESPIRATORY TRACT INFECTION, UNSPECIFIED TYPE: ICD-10-CM

## 2022-08-19 RX ORDER — ALBUTEROL SULFATE 90 UG/1
2 AEROSOL, METERED RESPIRATORY (INHALATION) EVERY 6 HOURS PRN
Qty: 18 G | Refills: 1 | Status: SHIPPED | OUTPATIENT
Start: 2022-08-19 | End: 2023-04-06

## 2022-08-19 NOTE — TELEPHONE ENCOUNTER
Call received from patient. States she has COVID. States her albuterol inhaler is outdated. Requesting a refill.    Routing refill request to provider for review/approval because:  Last prescription 2 years ago

## 2022-10-10 ENCOUNTER — LAB (OUTPATIENT)
Dept: LAB | Facility: CLINIC | Age: 67
End: 2022-10-10
Payer: COMMERCIAL

## 2022-10-10 DIAGNOSIS — E11.9 TYPE 2 DIABETES MELLITUS WITHOUT COMPLICATION, WITHOUT LONG-TERM CURRENT USE OF INSULIN (H): ICD-10-CM

## 2022-10-10 DIAGNOSIS — E03.9 HYPOTHYROIDISM, UNSPECIFIED TYPE: ICD-10-CM

## 2022-10-10 LAB
HBA1C MFR BLD: 6.5 % (ref 0–5.6)
T3FREE SERPL-MCNC: 2.8 PG/ML (ref 2–4.4)
T4 FREE SERPL-MCNC: 1.22 NG/DL (ref 0.76–1.46)
TSH SERPL DL<=0.005 MIU/L-ACNC: 3.58 MU/L (ref 0.4–4)

## 2022-10-10 PROCEDURE — 84439 ASSAY OF FREE THYROXINE: CPT

## 2022-10-10 PROCEDURE — 36415 COLL VENOUS BLD VENIPUNCTURE: CPT

## 2022-10-10 PROCEDURE — 84481 FREE ASSAY (FT-3): CPT

## 2022-10-10 PROCEDURE — 84443 ASSAY THYROID STIM HORMONE: CPT

## 2022-10-10 PROCEDURE — 83036 HEMOGLOBIN GLYCOSYLATED A1C: CPT

## 2023-04-05 ENCOUNTER — NURSE TRIAGE (OUTPATIENT)
Dept: NURSING | Facility: CLINIC | Age: 68
End: 2023-04-05
Payer: COMMERCIAL

## 2023-04-05 NOTE — TELEPHONE ENCOUNTER
Called and spoke to the patient. Assisted in getting the patient scheduled for an appointment with Dr. Boyer.    Appointments in Next Year    Apr 06, 2023  5:30 PM  (Arrive by 5:10 PM)  Provider Visit with Wilmar Boyer MD  [facility] ([facility] ) [phone]        Patient expressed understanding and had no additional questions at this time.    Cleopatra Boyd RN

## 2023-04-05 NOTE — TELEPHONE ENCOUNTER
"Nurse Triage SBAR    Is this a 2nd Level Triage? YES, LICENSED PRACTITIONER REVIEW IS REQUIRED    Situation: Pt calling to report that since mid-February she has been having more frequent \"heart flutters\" and irregular heartbeats that she notices at rest    Background: Pt has a hx of Hypothyroidism, CAD, HTN    Assessment: Tried to make an appt online but it advised that she call and talk to a nurse    She has had some irregular heart beats and fluttering since mid feb on and off  They have been worse for the past few days  Got one \"flutter\" while on the phone with writer x1    When she is up and walking around she does not feel them  At rest she can feel them more often    Pt changed how she takes her thyroid medication recently: Was not taking her levothyroxine properly, so now take it on it's own in the morning.     Vitals today: BP: 149/76, Pulse:65    Pt reports that she is Hydrated   Only drinks 1 cup of coffee a day      Protocol Recommended Disposition:   See in Office Within 3 Days, See in Office Today    Recommendation: Writer is routing this to patient's PCP as she is asking for a same-day appt in clinic rather than C.     Routed to provider    Does the patient meet one of the following criteria for ADS visit consideration? 16+ years old, with an [facility] PCP     TIP  Providers, please consider if this condition is appropriate for management at one of our Acute and Diagnostic Services sites.     If patient is a good candidate, please use dotphrase <dot>triageresponse and select Refer to ADS to document.  Reason for Disposition    Taking water pill (i.e., diuretic) or heart medication (e.g., digoxin)    History of hyperthyroidism or taking thyroid medication    Additional Information    Negative: Passed out (i.e., lost consciousness, collapsed and was not responding)    Negative: Shock suspected (e.g., cold/pale/clammy skin, too weak to stand, low BP, rapid pulse)    Negative: Difficult to awaken or acting " confused (e.g., disoriented, slurred speech)    Negative: Visible sweat on face or sweat dripping down face    Negative: Unable to walk, or can only walk with assistance (e.g., requires support)    Negative: Dizziness, lightheadedness, or weakness and heart beating very rapidly (e.g., > 140 / minute)    Negative: Dizziness, lightheadedness, or weakness and heart beating very slowly (e.g., < 50 / minute)    Negative: Received SHOCK from implantable cardiac defibrillator and has persisting symptoms (i.e., palpitations, lightheadedness)    Negative: Sounds like a life-threatening emergency to the triager    Negative: Chest pain    Negative: Difficulty breathing    Negative: Dizziness, lightheadedness, or weakness    Negative: Heart beating very rapidly (e.g., > 140 / minute) and present now  (Exception: During exercise.)    Negative: Heart beating very slowly (e.g., < 50 / minute)  (Exception: Athlete and heart rate normal for caller.)    Negative: New or worsened shortness of breath with activity (dyspnea on exertion)    Negative: Patient sounds very sick or weak to the triager    Negative: Wearing a 'Holter monitor' or 'cardiac event monitor'    Negative: Received SHOCK from implantable cardiac defibrillator (and now feels well)    Negative: Heart beating very rapidly (e.g., > 140 / minute) and not present now  (Exception: During exercise.)    Negative: Skipped or extra beat(s) and increases with exercise or exertion    Negative: Skipped or extra beat(s) and occurs 4 or more times per minute    Negative: History of heart disease (i.e., heart attack, bypass surgery, angina, angioplasty)  (Exception: Brief heartbeat symptoms that went away and now feels well.)    Negative: Age > 60 years  (Exception: Brief heartbeat symptoms that went away and now feels well.)    Negative: Heart rhythm alert (e.g., 'you have irregular heartbeat') from personal wearable device (e.g., Apple Watch)    Negative: Patient wants to be seen     Negative: ADHD and taking stimulant medication    Protocols used: HEART RATE AND HEARTBEAT [id number]    Heaven Horton RN  [facility] Nurse Advisor 3:01 PM 4/5/2023

## 2023-04-05 NOTE — TELEPHONE ENCOUNTER
Pt to see me me tomorrow (Thursday) at 5:30pm in current virtual spot IN CLINIC in person if appt still open when message reviewed by triage nurse. If appt has already been filled, then route message back to MD to chose other time. Otherwise schedule appt and inform pt

## 2023-04-06 ENCOUNTER — OFFICE VISIT (OUTPATIENT)
Dept: INTERNAL MEDICINE | Facility: CLINIC | Age: 68
End: 2023-04-06
Payer: COMMERCIAL

## 2023-04-06 VITALS
HEIGHT: 66 IN | HEART RATE: 70 BPM | DIASTOLIC BLOOD PRESSURE: 72 MMHG | RESPIRATION RATE: 16 BRPM | WEIGHT: 183.1 LBS | BODY MASS INDEX: 29.43 KG/M2 | OXYGEN SATURATION: 100 % | TEMPERATURE: 97.8 F | SYSTOLIC BLOOD PRESSURE: 132 MMHG

## 2023-04-06 DIAGNOSIS — E03.9 HYPOTHYROIDISM, UNSPECIFIED TYPE: ICD-10-CM

## 2023-04-06 DIAGNOSIS — G47.33 OSA (OBSTRUCTIVE SLEEP APNEA): ICD-10-CM

## 2023-04-06 DIAGNOSIS — I10 ESSENTIAL HYPERTENSION: ICD-10-CM

## 2023-04-06 DIAGNOSIS — E11.9 TYPE 2 DIABETES MELLITUS WITHOUT COMPLICATION, WITHOUT LONG-TERM CURRENT USE OF INSULIN (H): ICD-10-CM

## 2023-04-06 DIAGNOSIS — Z12.31 ENCOUNTER FOR SCREENING MAMMOGRAM FOR BREAST CANCER: ICD-10-CM

## 2023-04-06 DIAGNOSIS — R00.2 PALPITATIONS: Primary | ICD-10-CM

## 2023-04-06 DIAGNOSIS — E78.5 HYPERLIPIDEMIA LDL GOAL <100: ICD-10-CM

## 2023-04-06 DIAGNOSIS — Z12.11 COLON CANCER SCREENING: ICD-10-CM

## 2023-04-06 LAB — HBA1C MFR BLD: 6.7 % (ref 0–5.6)

## 2023-04-06 PROCEDURE — 99214 OFFICE O/P EST MOD 30 MIN: CPT | Performed by: INTERNAL MEDICINE

## 2023-04-06 PROCEDURE — 84443 ASSAY THYROID STIM HORMONE: CPT | Performed by: INTERNAL MEDICINE

## 2023-04-06 PROCEDURE — 82043 UR ALBUMIN QUANTITATIVE: CPT | Performed by: INTERNAL MEDICINE

## 2023-04-06 PROCEDURE — 80053 COMPREHEN METABOLIC PANEL: CPT | Performed by: INTERNAL MEDICINE

## 2023-04-06 PROCEDURE — 36415 COLL VENOUS BLD VENIPUNCTURE: CPT | Performed by: INTERNAL MEDICINE

## 2023-04-06 PROCEDURE — 82570 ASSAY OF URINE CREATININE: CPT | Performed by: INTERNAL MEDICINE

## 2023-04-06 PROCEDURE — 83735 ASSAY OF MAGNESIUM: CPT | Performed by: INTERNAL MEDICINE

## 2023-04-06 PROCEDURE — 80061 LIPID PANEL: CPT | Performed by: INTERNAL MEDICINE

## 2023-04-06 PROCEDURE — 83036 HEMOGLOBIN GLYCOSYLATED A1C: CPT | Performed by: INTERNAL MEDICINE

## 2023-04-06 NOTE — PATIENT INSTRUCTIONS
Labs as ordered   Appt with [facility] cardiology at [facility] for placement of a 10-day Ziopatch monitor. Call [phone]   Continue current meds pending lab results    Stop Aspirin 7 days before the colonoscopy and restart the day after unless told otherwise   Take Metformin 1/2 tab  twice a day the day before the colonoscopy when undergoing the prep  Hold hydrochlorathiazide the day before and the day of the colonoscopy and then restart the day after   Hold metformin  the AM of the colonoscopy  Resume Metformin 1 tab twice a day starting the PM of the colonoscopy date when home and eating again   No ibuprofen 5 days prior to the colonoscopy  Mammogram 6/10/23 or later  If A fib seen on Ziopatch, then ECHO of the heart and refer for sleep apnea and discuss anticoagulation  If not heard from clinic within 7 days after turning in the Ziopatch, then let me know

## 2023-04-06 NOTE — PROGRESS NOTES
ASSESSMENT:   1. Palpitations  Probable A-fib but cannot rule out SVT versus PVCs versus other.  Symptoms not related to exercise.  Labs as ordered.  We will get Ziopatch 10 day study.  If A-fib found, will also get cardiac echo  - Comprehensive metabolic panel; Future  - Magnesium; Future  - TSH with free T4 reflex; Future  - Adult Leadless EKG Monitor 8 to 14 Days; Future  - Comprehensive metabolic panel  - Magnesium  - TSH with free T4 reflex    2. BIJU (obstructive sleep apnea)  History of sleep apnea.  Not on CPAP now.  Has some mild daytime fatigue.  Would benefit from CPAP therapy overall but especially if having A-fib.  Will await heart monitor results and if A-fib followed, will then request patient to follow-up with sleep clinic to consider restarting CPAP therapy    3. Type 2 diabetes mellitus without complication, without long-term current use of insulin (H)  Recent blood sugars well controlled.  Due for lab follow-up.  Continue current management with metformin  - Hemoglobin A1c; Future  - Comprehensive metabolic panel; Future  - Albumin Random Urine Quantitative with Creat Ratio; Future  - Hemoglobin A1c  - Comprehensive metabolic panel  - Albumin Random Urine Quantitative with Creat Ratio    4. Hypothyroidism, unspecified type  Weight up since last on levothyroxine.  Due for lab follow-up.  Previously controlled  - TSH with free T4 reflex; Future  - TSH with free T4 reflex    5. Hyperlipidemia LDL goal <100  On statin therapy.  Previously at goal.  Needs lab follow-up  - Comprehensive metabolic panel; Future  - Lipid panel reflex to direct LDL Non-fasting; Future  - Comprehensive metabolic panel  - Lipid panel reflex to direct LDL Non-fasting    6. Essential hypertension  Controlled.  Continue current medication.  Labs ordered  - Comprehensive metabolic panel; Future  - Albumin Random Urine Quantitative with Creat Ratio; Future  - Comprehensive metabolic panel  - Albumin Random Urine Quantitative  with Creat Ratio    7. Encounter for screening mammogram for breast cancer  Due for breast cancer screening.  Mammography ordered  - *MA Screening Digital Bilateral; Future    8. Colon cancer screening  Has colonoscopy scheduled 5/9/2023.  Discussed medication modifications prior to procedure.  See plan discussion below       PLAN:  Labs as ordered   Appt with [facility] cardiology at [facility] for placement of a 10-day Ziopatch monitor. Call [phone]   Continue current meds pending lab results    Stop Aspirin 7 days before the colonoscopy and restart the day after unless told otherwise   Take Metformin 1/2 tab  twice a day the day before the colonoscopy when undergoing the prep  Hold hydrochlorathiazide the day before and the day of the colonoscopy and then restart the day after   Hold metformin  the AM of the colonoscopy  Resume Metformin 1 tab twice a day starting the PM of the colonoscopy date when home and eating again   No ibuprofen 5 days prior to the colonoscopy  Mammogram 6/10/23 or later  If A fib seen on Ziopatch, then ECHO of the heart and refer for sleep apnea and discuss anticoagulation  If not heard from clinic within 7 days after turning in the Ziopatch, then let me know      (Chart documentation was completed, in part, with [employer] voice-recognition software. Even though reviewed, some grammatical, spelling, and word errors may remain.)    Wilmar Boyer MD  Internal Medicine Department  [facility]      Анна Hsu is a 67 year old, presenting for the following health issues:  Heart Problem        4/6/2023     5:01 PM   Additional Questions   Roomed by Toña     Heart Problem    History of Present Illness       Reason for visit:  Irregular heartbeat  Symptom onset:  More than a month  Symptoms include:  Irregular heartbeat or flutter  Symptom intensity:  Mild  Symptom progression:  Staying the same  Had these symptoms before:  Yes  Has tried/received treatment for  "these symptoms:  No    She eats 2-3 servings of fruits and vegetables daily.She consumes 1 sweetened beverage(s) daily.She exercises with enough effort to increase her heart rate 9 or less minutes per day.  She exercises with enough effort to increase her heart rate 3 or less days per week.   She is taking medications regularly.      Most recent lab results reviewed with pt.      Recent blood sugars in a.m., p.m. as below:    X, 129,  118, 109  108,  X  118/112  X, 121    Started noticing palpitation symptoms intermittently a couple months ago.  Gets a fluttering feeling in her chest and heartbeat usually feels slightly irregular.  Does not have lightheadedness, chest pain, shortness of breath with the symptoms..  Not related to activity.  Patient recently was shoveling snow at the [employer] and did not have any palpitations during that time.  No symptoms with walking.  Asymptomatic currently.  Noticed some symptoms briefly 3 times earlier today with the longest lasting about a minute that occurred at rest.  Has 1 cup of coffee but has been doing that for years.  Weight up 12 pounds from last visit.  History of sleep apnea.  Not currently using CPAP therapy.  Had some fatigue mildly during the day.  Snores at night.  Takes 1 nap each day about 1:30 in the afternoon for 50 minutes.  Works 3 PM to 11 PM       Additional ROS:   Constitutional, HEENT, Cardiovascular, Pulmonary, GI and , Neuro, MSK and Psych review of systems/symptoms are otherwise negative or unchanged from previous, except as noted above.      OBJECTIVE:  /72   Pulse 70   Temp 97.8  F (36.6  C) (Temporal)   Resp 16   Ht 1.676 m (5' 6\")   Wt 83.1 kg (183 lb 1.6 oz)   SpO2 100%   BMI 29.55 kg/m     Estimated body mass index is 29.55 kg/m  as calculated from the following:    Height as of this encounter: 1.676 m (5' 6\").    Weight as of this encounter: 83.1 kg (183 lb 1.6 oz).     HENT: ear canals and TM's normal and nose and mouth without " ulcers or lesions.  Mildly narrowed posterior pharyngeal airway with body weight  Neck: no adenopathy. Thyroid normal to palpation. No bruits  Pulm: Lungs clear to auscultation   CV: Regular rates and rhythm.  No ectopy appreciated with 30 seconds of auscultation  GI: Soft, nontender, Normal active bowel sounds, No hepatosplenomegaly or masses palpable  Ext: Peripheral pulses intact. No edema.

## 2023-04-07 LAB
ALBUMIN SERPL BCG-MCNC: 4.9 G/DL (ref 3.5–5.2)
ALP SERPL-CCNC: 56 U/L (ref 35–104)
ALT SERPL W P-5'-P-CCNC: 51 U/L (ref 10–35)
ANION GAP SERPL CALCULATED.3IONS-SCNC: 15 MMOL/L (ref 7–15)
AST SERPL W P-5'-P-CCNC: 64 U/L (ref 10–35)
BILIRUB SERPL-MCNC: 0.3 MG/DL
BUN SERPL-MCNC: 20.1 MG/DL (ref 8–23)
CALCIUM SERPL-MCNC: 9.9 MG/DL (ref 8.8–10.2)
CHLORIDE SERPL-SCNC: 101 MMOL/L (ref 98–107)
CHOLEST SERPL-MCNC: 146 MG/DL
CREAT SERPL-MCNC: 0.89 MG/DL (ref 0.51–0.95)
CREAT UR-MCNC: 48.8 MG/DL
DEPRECATED HCO3 PLAS-SCNC: 22 MMOL/L (ref 22–29)
GFR SERPL CREATININE-BSD FRML MDRD: 70 ML/MIN/1.73M2
GLUCOSE SERPL-MCNC: 125 MG/DL (ref 70–99)
HDLC SERPL-MCNC: 49 MG/DL
LDLC SERPL CALC-MCNC: 62 MG/DL
MAGNESIUM SERPL-MCNC: 1.8 MG/DL (ref 1.7–2.3)
MICROALBUMIN UR-MCNC: 56 MG/L
MICROALBUMIN/CREAT UR: 114.75 MG/G CR (ref 0–25)
NONHDLC SERPL-MCNC: 97 MG/DL
POTASSIUM SERPL-SCNC: 5.2 MMOL/L (ref 3.4–5.3)
PROT SERPL-MCNC: 7.8 G/DL (ref 6.4–8.3)
SODIUM SERPL-SCNC: 138 MMOL/L (ref 136–145)
TRIGL SERPL-MCNC: 175 MG/DL
TSH SERPL DL<=0.005 MIU/L-ACNC: 3.77 UIU/ML (ref 0.3–4.2)

## 2023-04-10 ENCOUNTER — MYC MEDICAL ADVICE (OUTPATIENT)
Dept: INTERNAL MEDICINE | Facility: CLINIC | Age: 68
End: 2023-04-10

## 2023-04-19 ENCOUNTER — HOSPITAL ENCOUNTER (OUTPATIENT)
Dept: CARDIOLOGY | Facility: CLINIC | Age: 68
Discharge: HOME | End: 2023-04-19
Attending: INTERNAL MEDICINE | Admitting: INTERNAL MEDICINE
Payer: COMMERCIAL

## 2023-04-19 DIAGNOSIS — R00.2 PALPITATIONS: ICD-10-CM

## 2023-04-19 PROCEDURE — 93246 EXT ECG>7D<15D RECORDING: CPT

## 2023-04-19 PROCEDURE — 93248 EXT ECG>7D<15D REV&INTERPJ: CPT | Performed by: INTERNAL MEDICINE

## 2023-05-15 ENCOUNTER — PATIENT OUTREACH (OUTPATIENT)
Dept: CARE COORDINATION | Facility: CLINIC | Age: 68
End: 2023-05-15
Payer: COMMERCIAL

## 2023-05-25 DIAGNOSIS — E11.9 TYPE 2 DIABETES MELLITUS WITHOUT COMPLICATION, WITHOUT LONG-TERM CURRENT USE OF INSULIN (H): ICD-10-CM

## 2023-05-25 DIAGNOSIS — E78.5 HYPERLIPIDEMIA LDL GOAL <100: ICD-10-CM

## 2023-05-25 DIAGNOSIS — E03.9 HYPOTHYROIDISM, UNSPECIFIED TYPE: ICD-10-CM

## 2023-05-25 DIAGNOSIS — I10 ESSENTIAL HYPERTENSION: ICD-10-CM

## 2023-05-25 DIAGNOSIS — M1A.9XX0 CHRONIC GOUT WITHOUT TOPHUS, UNSPECIFIED CAUSE, UNSPECIFIED SITE: ICD-10-CM

## 2023-05-25 RX ORDER — ATORVASTATIN CALCIUM 20 MG/1
20 TABLET, FILM COATED ORAL DAILY
Qty: 90 TABLET | Refills: 2 | Status: SHIPPED | OUTPATIENT
Start: 2023-05-25 | End: 2023-08-12

## 2023-05-25 RX ORDER — HYDROCHLOROTHIAZIDE 25 MG/1
25 TABLET ORAL EVERY MORNING
Qty: 90 TABLET | Refills: 2 | Status: SHIPPED | OUTPATIENT
Start: 2023-05-25 | End: 2023-08-12

## 2023-05-25 RX ORDER — ATENOLOL 50 MG/1
50 TABLET ORAL DAILY
Qty: 90 TABLET | Refills: 2 | Status: SHIPPED | OUTPATIENT
Start: 2023-05-25 | End: 2023-08-12

## 2023-05-25 RX ORDER — AMLODIPINE BESYLATE 5 MG/1
TABLET ORAL
Qty: 90 TABLET | Refills: 2 | Status: SHIPPED | OUTPATIENT
Start: 2023-05-25 | End: 2023-08-11 | Stop reason: DRUGHIGH

## 2023-05-25 RX ORDER — LEVOTHYROXINE SODIUM 88 UG/1
88 TABLET ORAL DAILY
Qty: 90 TABLET | Refills: 2 | Status: SHIPPED | OUTPATIENT
Start: 2023-05-25 | End: 2023-08-12

## 2023-05-25 RX ORDER — LOSARTAN POTASSIUM 100 MG/1
TABLET ORAL
Qty: 90 TABLET | Refills: 2 | Status: SHIPPED | OUTPATIENT
Start: 2023-05-25 | End: 2023-08-11 | Stop reason: ALTCHOICE

## 2023-05-25 NOTE — TELEPHONE ENCOUNTER
"    Requested Prescriptions   Pending Prescriptions Disp Refills     levothyroxine (SYNTHROID/LEVOTHROID) 88 MCG tablet [Pharmacy Med Name: Levothyroxine Sodium Oral Tablet 88 MCG] 90 tablet 0     Sig: Take 1 tablet (88 mcg) by mouth daily       Thyroid Protocol Passed - 5/25/2023  2:01 AM        Passed - Patient is 12 years or older        Passed - Recent (12 mo) or future (30 days) visit within the authorizing provider's specialty     Patient has had an office visit with the authorizing provider or a provider within the authorizing providers department within the previous 12 mos or has a future within next 30 days. See \"Patient Info\" tab in inbasket, or \"Choose Columns\" in Meds & Orders section of the refill encounter.              Passed - Medication is active on med list        Passed - Normal TSH on file in past 12 months     Recent Labs   Lab Test 04/06/23  1824   TSH 3.77              Passed - No active pregnancy on record     If patient is pregnant or has had a positive pregnancy test, please check TSH.          Passed - No positive pregnancy test in past 12 months     If patient is pregnant or has had a positive pregnancy test, please check TSH.             allopurinol (ZYLOPRIM) 300 MG tablet [Pharmacy Med Name: Allopurinol Oral Tablet 300 MG] 90 tablet 0     Sig: TAKE 1 TABLET (300 MG) BY MOUTH DAILY       Gout Agents Protocol Failed - 5/25/2023  2:01 AM        Failed - CBC on file in past 12 months     Recent Labs   Lab Test 04/20/22  0953   WBC 8.1   RBC 4.36   HGB 12.4   HCT 39.7                    Failed - Has Uric Acid on file in past 12 months and value is less than 6     Recent Labs   Lab Test 04/20/22  0953   URIC 4.2     If level is 6mg/dL or greater, ok to refill one time and refer to provider.           Passed - ALT on file in past 12 months     Recent Labs   Lab Test 04/06/23 1824   ALT 51*             Passed - Recent (12 mo) or future (30 days) visit within the authorizing provider's " "specialty     Patient has had an office visit with the authorizing provider or a provider within the authorizing providers department within the previous 12 mos or has a future within next 30 days. See \"Patient Info\" tab in inbasket, or \"Choose Columns\" in Meds & Orders section of the refill encounter.              Passed - Medication is active on med list        Passed - Patient is age 18 or older        Passed - No active pregnancy on record        Passed - Normal serum creatinine on file in the past 12 months     Recent Labs   Lab Test 04/06/23  1824   CR 0.89       Ok to refill medication if creatinine is low          Passed - No positive pregnancy test in past year           hydrochlorothiazide (HYDRODIURIL) 25 MG tablet [Pharmacy Med Name: hydroCHLOROthiazide Oral Tablet 25 MG] 90 tablet 0     Sig: TAKE 1 TABLET (25 MG) BY MOUTH EVERY MORNING       Diuretics (Including Combos) Protocol Passed - 5/25/2023  2:01 AM        Passed - Blood pressure under 140/90 in past 12 months     BP Readings from Last 3 Encounters:   04/06/23 132/72   04/29/22 128/76   04/08/21 132/74                 Passed - Recent (12 mo) or future (30 days) visit within the authorizing provider's specialty     Patient has had an office visit with the authorizing provider or a provider within the authorizing providers department within the previous 12 mos or has a future within next 30 days. See \"Patient Info\" tab in inbasket, or \"Choose Columns\" in Meds & Orders section of the refill encounter.              Passed - Medication is active on med list        Passed - Patient is age 18 or older        Passed - No active pregancy on record        Passed - Normal serum creatinine on file in past 12 months     Recent Labs   Lab Test 04/06/23  1824   CR 0.89              Passed - Normal serum potassium on file in past 12 months     Recent Labs   Lab Test 04/06/23  1824   POTASSIUM 5.2                    Passed - Normal serum sodium on file in past 12 " "months     Recent Labs   Lab Test 04/06/23 1824                 Passed - No positive pregnancy test in past 12 months           losartan (COZAAR) 100 MG tablet [Pharmacy Med Name: Losartan Potassium Oral Tablet 100 MG] 90 tablet 0     Sig: TAKE 1 TABLET BY MOUTH DAILY       Angiotensin-II Receptors Passed - 5/25/2023  2:01 AM        Passed - Last blood pressure under 140/90 in past 12 months     BP Readings from Last 3 Encounters:   04/06/23 132/72   04/29/22 128/76   04/08/21 132/74                 Passed - Recent (12 mo) or future (30 days) visit within the authorizing provider's specialty     Patient has had an office visit with the authorizing provider or a provider within the authorizing providers department within the previous 12 mos or has a future within next 30 days. See \"Patient Info\" tab in inbasket, or \"Choose Columns\" in Meds & Orders section of the refill encounter.              Passed - Medication is active on med list        Passed - Patient is age 18 or older        Passed - No active pregnancy on record        Passed - Normal serum creatinine on file in past 12 months     Recent Labs   Lab Test 04/06/23 1824   CR 0.89       Ok to refill medication if creatinine is low          Passed - Normal serum potassium on file in past 12 months     Recent Labs   Lab Test 04/06/23 1824   POTASSIUM 5.2                    Passed - No positive pregnancy test in past 12 months           metFORMIN (GLUCOPHAGE) 1000 MG tablet [Pharmacy Med Name: metFORMIN HCl Oral Tablet 1000 MG] 180 tablet 0     Sig: TAKE ONE TABLET BY MOUTH TWICE DAILY WITH MEALS.       Biguanide Agents Passed - 5/25/2023  2:01 AM        Passed - Patient is age 10 or older        Passed - Patient has documented A1c within the specified period of time.     If HgbA1C is 8 or greater, it needs to be on file within the past 3 months.  If less than 8, must be on file within the past 6 months.     Recent Labs   Lab Test 04/06/23 1824   A1C " "6.7*             Passed - Patient's CR is NOT>1.4 OR Patient's EGFR is NOT<45 within past 12 mos.     Recent Labs   Lab Test 04/06/23  1824 04/20/22  0953 04/15/21  1303   GFRESTIMATED 70   < > 68   GFRESTBLACK  --   --  79    < > = values in this interval not displayed.       Recent Labs   Lab Test 04/06/23  1824   CR 0.89             Passed - Patient does NOT have a diagnosis of CHF.        Passed - Medication is active on med list        Passed - Patient is not pregnant        Passed - Patient has not had a positive pregnancy test within the past 12 mos.         Passed - Recent (6 mo) or future (30 days) visit within the authorizing provider's specialty     Patient had office visit in the last 6 months or has a visit in the next 30 days with authorizing provider or within the authorizing provider's specialty.  See \"Patient Info\" tab in inbasket, or \"Choose Columns\" in Meds & Orders section of the refill encounter.               amLODIPine (NORVASC) 5 MG tablet [Pharmacy Med Name: amLODIPine Besylate Oral Tablet 5 MG] 90 tablet 0     Sig: TAKE ONE TABLET BY MOUTH ONE TIME DAILY       Calcium Channel Blockers Protocol  Passed - 5/25/2023  2:01 AM        Passed - Blood pressure under 140/90 in past 12 months     BP Readings from Last 3 Encounters:   04/06/23 132/72   04/29/22 128/76   04/08/21 132/74                 Passed - Recent (12 mo) or future (30 days) visit within the authorizing provider's specialty     Patient has had an office visit with the authorizing provider or a provider within the authorizing providers department within the previous 12 mos or has a future within next 30 days. See \"Patient Info\" tab in inbasket, or \"Choose Columns\" in Meds & Orders section of the refill encounter.              Passed - Medication is active on med list        Passed - Patient is age 18 or older        Passed - No active pregnancy on record        Passed - Normal serum creatinine on file in past 12 months     Recent " "Labs   Lab Test 04/06/23  1824   CR 0.89       Ok to refill medication if creatinine is low          Passed - No positive pregnancy test in past 12 months           atorvastatin (LIPITOR) 20 MG tablet [Pharmacy Med Name: Atorvastatin Calcium Oral Tablet 20 MG] 90 tablet 0     Sig: Take 1 tablet (20 mg) by mouth daily       Statins Protocol Passed - 5/25/2023  2:01 AM        Passed - LDL on file in past 12 months     Recent Labs   Lab Test 04/06/23  1824   LDL 62             Passed - No abnormal creatine kinase in past 12 months     No lab results found.             Passed - Recent (12 mo) or future (30 days) visit within the authorizing provider's specialty     Patient has had an office visit with the authorizing provider or a provider within the authorizing providers department within the previous 12 mos or has a future within next 30 days. See \"Patient Info\" tab in inbasket, or \"Choose Columns\" in Meds & Orders section of the refill encounter.              Passed - Medication is active on med list        Passed - Patient is age 18 or older        Passed - No active pregnancy on record        Passed - No positive pregnancy test in past 12 months           atenolol (TENORMIN) 50 MG tablet [Pharmacy Med Name: Atenolol Oral Tablet 50 MG] 90 tablet 0     Sig: Take 1 tablet (50 mg) by mouth daily       Beta-Blockers Protocol Passed - 5/25/2023  2:01 AM        Passed - Blood pressure under 140/90 in past 12 months     BP Readings from Last 3 Encounters:   04/06/23 132/72   04/29/22 128/76   04/08/21 132/74                 Passed - Patient is age 6 or older        Passed - Recent (12 mo) or future (30 days) visit within the authorizing provider's specialty     Patient has had an office visit with the authorizing provider or a provider within the authorizing providers department within the previous 12 mos or has a future within next 30 days. See \"Patient Info\" tab in inbasket, or \"Choose Columns\" in Meds & Orders section " of the refill encounter.              Passed - Medication is active on med list

## 2023-05-26 RX ORDER — ALLOPURINOL 300 MG/1
1 TABLET ORAL DAILY
Qty: 90 TABLET | Refills: 0 | Status: SHIPPED | OUTPATIENT
Start: 2023-05-26 | End: 2023-08-12

## 2023-06-02 ENCOUNTER — TRANSFERRED RECORDS (OUTPATIENT)
Dept: HEALTH INFORMATION MANAGEMENT | Facility: CLINIC | Age: 68
End: 2023-06-02
Payer: COMMERCIAL

## 2023-06-05 ENCOUNTER — MYC MEDICAL ADVICE (OUTPATIENT)
Dept: INTERNAL MEDICINE | Facility: CLINIC | Age: 68
End: 2023-06-05

## 2023-06-12 ENCOUNTER — HOSPITAL ENCOUNTER (OUTPATIENT)
Dept: MAMMOGRAPHY | Facility: CLINIC | Age: 68
Discharge: HOME | End: 2023-06-12
Attending: INTERNAL MEDICINE | Admitting: INTERNAL MEDICINE
Payer: COMMERCIAL

## 2023-06-12 DIAGNOSIS — Z12.31 ENCOUNTER FOR SCREENING MAMMOGRAM FOR BREAST CANCER: ICD-10-CM

## 2023-06-12 PROCEDURE — 77067 SCR MAMMO BI INCL CAD: CPT

## 2023-06-21 NOTE — TELEPHONE ENCOUNTER
Received a call from the patient following up on her [employer] message below...    Patient would like to know the interpretation of her Zio Patch? Patient states she re-scheduled her colonoscopy for July 28, 2023. Patient is needing clearance from the provider before she can proceed with the procedure.     Will route HP message to PCP for review. Patient would like [employer] message back with the provider's response.     Cleopatra Boyd RN

## 2023-07-27 DIAGNOSIS — E11.9 TYPE 2 DIABETES MELLITUS WITHOUT COMPLICATION, WITHOUT LONG-TERM CURRENT USE OF INSULIN (H): ICD-10-CM

## 2023-07-27 RX ORDER — BLOOD SUGAR DIAGNOSTIC
STRIP MISCELLANEOUS
Qty: 100 STRIP | Refills: 0 | Status: SHIPPED | OUTPATIENT
Start: 2023-07-27 | End: 2024-07-11

## 2023-07-28 ENCOUNTER — TRANSFERRED RECORDS (OUTPATIENT)
Dept: HEALTH INFORMATION MANAGEMENT | Facility: CLINIC | Age: 68
End: 2023-07-28
Payer: COMMERCIAL

## 2023-08-01 ENCOUNTER — TRANSFERRED RECORDS (OUTPATIENT)
Dept: HEALTH INFORMATION MANAGEMENT | Facility: CLINIC | Age: 68
End: 2023-08-01
Payer: COMMERCIAL

## 2023-08-01 LAB — RETINOPATHY: NEGATIVE

## 2023-08-07 ASSESSMENT — ENCOUNTER SYMPTOMS
NAUSEA: 0
ARTHRALGIAS: 1
CONSTIPATION: 0
ABDOMINAL PAIN: 0
PARESTHESIAS: 0
FEVER: 0
EYE PAIN: 0
JOINT SWELLING: 0
MYALGIAS: 0
WEAKNESS: 0
DIZZINESS: 0
HEMATOCHEZIA: 0
COUGH: 0
CHILLS: 0
HEADACHES: 0
NERVOUS/ANXIOUS: 0
SHORTNESS OF BREATH: 0
SORE THROAT: 0
DYSURIA: 0
HEMATURIA: 0
BREAST MASS: 0
HEARTBURN: 0
PALPITATIONS: 0
FREQUENCY: 0
DIARRHEA: 0

## 2023-08-07 ASSESSMENT — ACTIVITIES OF DAILY LIVING (ADL): CURRENT_FUNCTION: NO ASSISTANCE NEEDED

## 2023-08-11 ENCOUNTER — OFFICE VISIT (OUTPATIENT)
Dept: INTERNAL MEDICINE | Facility: CLINIC | Age: 68
End: 2023-08-11
Payer: COMMERCIAL

## 2023-08-11 VITALS
TEMPERATURE: 98.9 F | DIASTOLIC BLOOD PRESSURE: 70 MMHG | BODY MASS INDEX: 28.48 KG/M2 | HEART RATE: 61 BPM | HEIGHT: 66 IN | SYSTOLIC BLOOD PRESSURE: 162 MMHG | OXYGEN SATURATION: 98 % | WEIGHT: 177.2 LBS

## 2023-08-11 DIAGNOSIS — M1A.9XX0 CHRONIC GOUT WITHOUT TOPHUS, UNSPECIFIED CAUSE, UNSPECIFIED SITE: ICD-10-CM

## 2023-08-11 DIAGNOSIS — E03.9 HYPOTHYROIDISM, UNSPECIFIED TYPE: ICD-10-CM

## 2023-08-11 DIAGNOSIS — E78.5 HYPERLIPIDEMIA LDL GOAL <100: ICD-10-CM

## 2023-08-11 DIAGNOSIS — I10 HYPERTENSION, UNSPECIFIED TYPE: ICD-10-CM

## 2023-08-11 DIAGNOSIS — R80.9 PROTEINURIA, UNSPECIFIED TYPE: ICD-10-CM

## 2023-08-11 DIAGNOSIS — Z00.00 ENCOUNTER FOR MEDICARE ANNUAL WELLNESS EXAM: ICD-10-CM

## 2023-08-11 DIAGNOSIS — E11.9 TYPE 2 DIABETES MELLITUS WITHOUT COMPLICATION, WITHOUT LONG-TERM CURRENT USE OF INSULIN (H): ICD-10-CM

## 2023-08-11 DIAGNOSIS — K75.9 HEPATITIS: ICD-10-CM

## 2023-08-11 PROCEDURE — 99207 PR FOOT EXAM NO CHARGE: CPT | Mod: 25 | Performed by: INTERNAL MEDICINE

## 2023-08-11 PROCEDURE — 99397 PER PM REEVAL EST PAT 65+ YR: CPT | Performed by: INTERNAL MEDICINE

## 2023-08-11 PROCEDURE — 99214 OFFICE O/P EST MOD 30 MIN: CPT | Mod: 25 | Performed by: INTERNAL MEDICINE

## 2023-08-11 RX ORDER — AMLODIPINE BESYLATE 10 MG/1
10 TABLET ORAL DAILY
Qty: 90 TABLET | Refills: 3 | Status: SHIPPED | OUTPATIENT
Start: 2023-08-11 | End: 2023-08-12

## 2023-08-11 RX ORDER — IRBESARTAN 300 MG/1
300 TABLET ORAL AT BEDTIME
Qty: 90 TABLET | Refills: 3 | Status: SHIPPED | OUTPATIENT
Start: 2023-08-11 | End: 2024-07-31

## 2023-08-11 ASSESSMENT — ENCOUNTER SYMPTOMS
SORE THROAT: 0
DYSURIA: 0
HEMATURIA: 0
PARESTHESIAS: 0
FEVER: 0
BREAST MASS: 0
HEADACHES: 0
PALPITATIONS: 0
MYALGIAS: 0
ARTHRALGIAS: 1
ABDOMINAL PAIN: 0
HEMATOCHEZIA: 0
SHORTNESS OF BREATH: 0
JOINT SWELLING: 0
HEARTBURN: 0
NAUSEA: 0
CONSTIPATION: 0
CHILLS: 0
COUGH: 0
EYE PAIN: 0
DIZZINESS: 0
FREQUENCY: 0
DIARRHEA: 0
WEAKNESS: 0
NERVOUS/ANXIOUS: 0

## 2023-08-11 ASSESSMENT — ACTIVITIES OF DAILY LIVING (ADL): CURRENT_FUNCTION: NO ASSISTANCE NEEDED

## 2023-08-11 NOTE — PROGRESS NOTES
"SUBJECTIVE:   Aracelis is a 67 year old who presents for Preventive Visit. and follow-up regarding multiple medical issues as below      Are you in the first 12 months of your Medicare coverage?  No    Healthy Habits:     In general, how would you rate your overall health?  Good    Frequency of exercise:  1 day/week    Duration of exercise:  15-30 minutes    Do you usually eat at least 4 servings of fruit and vegetables a day, include whole grains    & fiber and avoid regularly eating high fat or \"junk\" foods?  Yes    Taking medications regularly:  Yes    Medication side effects:  None    Ability to successfully perform activities of daily living:  No assistance needed    Home Safety:  Lack of grab bars in the bathroom    Hearing Impairment:  Need to ask people to speak up or repeat themselves    In the past 6 months, have you been bothered by leaking of urine? Yes    In general, how would you rate your overall mental or emotional health?  Good      Have you ever done Advance Care Planning? (For example, a Health Directive, POLST, or a discussion with a medical provider or your loved ones about your wishes): No, advance care planning information given to patient to review.  Patient plans to discuss their wishes with loved ones or provider.        Fall risk  Fallen 2 or more times in the past year?: No  Any fall with injury in the past year?: No  Cognitive Screening   1) Repeat 3 items (Leader, Season, Table)    2) Clock draw: NORMAL  3) 3 item recall: Recalls 3 objects  Results: 3 items recalled: COGNITIVE IMPAIRMENT LESS LIKELY    Mini-CogTM Copyright SHANELLE Chandler. Licensed by the author for use in [facility]; reprinted with permission (isabel@.[email or web address]). All rights reserved.      Do you have sleep apnea, excessive snoring or daytime drowsiness? : no    Reviewed and updated as needed this visit by clinical staff                  Reviewed and updated as needed this visit by Provider                 Social History "     Tobacco Use    Smoking status: Never    Smokeless tobacco: Never   Substance Use Topics    Alcohol use: Yes     Comment: 1 drink weekly             8/7/2023     5:26 PM   Alcohol Use   Prescreen: >3 drinks/day or >7 drinks/week? No     Do you have a current opioid prescription? No  Do you use any other controlled substances or medications that are not prescribed by a provider? None              Current providers sharing in care for this patient include:   Patient Care Team:  Wilmar Boyer MD as PCP - General (Internal Medicine)  Wilmar Boyer MD as Assigned PCP    The following health maintenance items are reviewed in Epic and correct as of today:  Health Maintenance   Topic Date Due    EYE EXAM  07/09/2022    DIABETIC FOOT EXAM  04/29/2023    ANNUAL REVIEW OF HM ORDERS  04/29/2023    COVID-19 Vaccine (6 - Pfizer series) 05/09/2023    MEDICARE ANNUAL WELLNESS VISIT  06/13/2023    INFLUENZA VACCINE (1) 09/01/2023    A1C  10/06/2023    BMP  04/06/2024    LIPID  04/06/2024    MICROALBUMIN  04/06/2024    TSH W/FREE T4 REFLEX  04/06/2024    FALL RISK ASSESSMENT  08/11/2024    MAMMO SCREENING  06/12/2025    ADVANCE CARE PLANNING  05/01/2027    DTAP/TDAP/TD IMMUNIZATION (2 - Td or Tdap) 07/06/2027    COLORECTAL CANCER SCREENING  11/15/2029    DEXA  07/14/2037    HEPATITIS C SCREENING  Completed    PHQ-2 (once per calendar year)  Completed    Pneumococcal Vaccine: 65+ Years  Completed    ZOSTER IMMUNIZATION  Completed    IPV IMMUNIZATION  Aged Out    MENINGITIS IMMUNIZATION  Aged Out     Labs reviewed in EPIC    Component      Latest Ref Rng 4/6/2023  6:24 PM   Sodium      136 - 145 mmol/L 138    Potassium      3.4 - 5.3 mmol/L 5.2    Chloride      98 - 107 mmol/L 101    Carbon Dioxide (CO2)      22 - 29 mmol/L 22    Anion Gap      7 - 15 mmol/L 15    Urea Nitrogen      8.0 - 23.0 mg/dL 20.1    Creatinine      0.51 - 0.95 mg/dL 0.89    Calcium      8.8 - 10.2 mg/dL 9.9    Glucose      70 - 99 mg/dL 125 (H)    Alkaline  Phosphatase      35 - 104 U/L 56    AST      10 - 35 U/L 64 (H)    ALT      10 - 35 U/L 51 (H)    Protein Total      6.4 - 8.3 g/dL 7.8    Albumin      3.5 - 5.2 g/dL 4.9    Bilirubin Total      <=1.2 mg/dL 0.3    GFR Estimate      >60 mL/min/1.73m2 70    Cholesterol      <200 mg/dL 146    Triglycerides      <150 mg/dL 175 (H)    HDL Cholesterol      >=50 mg/dL 49 (L)    LDL Cholesterol Calculated      <=100 mg/dL 62    Non HDL Cholesterol      <130 mg/dL 97    Creatinine Urine      mg/dL 48.8    Albumin Urine mg/L      mg/L 56.0    Albumin Urine mg/g Cr      0.00 - 25.00 mg/g Cr 114.75 (H)    Hemoglobin A1C      0.0 - 5.6 % 6.7 (H)    Magnesium      1.7 - 2.3 mg/dL 1.8    TSH      0.30 - 4.20 uIU/mL 3.77       Legend:  (H) High  (L) Low      4/29/2022    12:43 PM 8/7/2023     5:27 PM   Breast CA Risk Assessment (FHS-7)   Do you have a family history of breast, colon, or ovarian cancer? Yes No / Unknown         Mammogram Screening: Recommended mammography every 1-2 years with patient discussion and risk factor consideration  Pertinent mammograms are reviewed under the imaging tab.    Review of Systems   Constitutional:  Negative for chills and fever.   HENT:  Positive for hearing loss. Negative for congestion, ear pain and sore throat.    Eyes:  Negative for pain and visual disturbance.   Respiratory:  Negative for cough and shortness of breath.    Cardiovascular:  Negative for chest pain, palpitations and peripheral edema.   Gastrointestinal:  Negative for abdominal pain, constipation, diarrhea, heartburn, hematochezia and nausea.   Breasts:  Negative for tenderness, breast mass and discharge.   Genitourinary:  Positive for urgency. Negative for dysuria, frequency, genital sores, hematuria, pelvic pain, vaginal bleeding and vaginal discharge.   Musculoskeletal:  Positive for arthralgias. Negative for joint swelling and myalgias.   Skin:  Negative for rash.   Neurological:  Negative for dizziness, weakness,  "headaches and paresthesias.   Psychiatric/Behavioral:  Negative for mood changes. The patient is not nervous/anxious.       Sugars   AM   120,113,103,128  ,121,100,123    Patient states she had a colonoscopy 7/28/2023 at [facility] with 1 polyp found.  Report has not been put in chart yet.  Supposed to repeat colonoscopy in 5 years  And eye exam at [facility] 8/1/2023.  Has cataracts but not to the point of requiring surgery  Working with pelvic floor center and Urology for urinary issues.  Has difficulty with bladder relaxation.  Trospium currently on hold.  Decreased hearing bilaterally but not to the point of wishing to use hearing aids.  Mild chronic left knee arthritis. Occ Tylenol use. Declines further treatment need now  Denies recent palpitations      OBJECTIVE:   BP (!) 162/70   Pulse 61   Temp 98.9  F (37.2  C) (Oral)   Ht 1.676 m (5' 6\")   Wt 80.4 kg (177 lb 3.2 oz)   SpO2 98%   BMI 28.60 kg/m   Estimated body mass index is 29.55 kg/m  as calculated from the following:    Height as of 4/6/23: 1.676 m (5' 6\").    Weight as of 4/6/23: 83.1 kg (183 lb 1.6 oz).  Physical Exam  General appearance -   alert, no distress  Skin - No rashes or lesions.  Head - normocephalic, atraumatic  Eyes - GIRMA, EOMI, fundi exam with nondilated pupils negative.  Ears - External ears normal. Canals clear. TM's normal.  Nose/Sinuses - Nares normal. Septum midline. Mucosa normal. No drainage or sinus tenderness.  Oropharynx - No erythema, no adenopathy, no exudates.  Neck - Supple without adenopathy or thyromegaly. No bruits.  Lungs - Clear to auscultation without wheezes/rhonchi.  Heart - Regular rate and rhythm without murmurs, clicks, or gallops.  Nodes - No supraclavicular, axillary, or inguinal adenopathy palpable.  Breasts - deferred  Abdomen - Abdomen soft, non-tender. BS normal. No masses or hepatosplenomegaly palpable. No bruits.  Extremities -No cyanosis, clubbing or edema.    Musculoskeletal - Spine ROM " normal. Muscular strength intact.    Peripheral pulses - radial=4/4, femoral=4/4, posterior tibial=4/4, dorsalis pedis=4/4,  Neuro - Gait stable. Reflexes normal and symmetric. Sensation grossly WNL.  Genital/Rectal - deferred          ASSESSMENT / PLAN:   1. Encounter for Medicare annual wellness exam  See plan discussion below regarding healthcare maintenance.  Otherwise up-to-date.  Independent in ADLs.  Current cataracts mild and not requiring surgical treatment per patient.  Declines hearing aid evaluation. Urinary issues being managed by Urology and pelvic floor center    2. Type 2 diabetes mellitus without complication, without long-term current use of insulin (H)  Controlled.  Continue current medication.  Repeat A1c 3 months  - Hemoglobin A1c; Future  - Comprehensive metabolic panel; Future  - metFORMIN (GLUCOPHAGE) 1000 MG tablet; Take 1 tablet (1,000 mg) by mouth 2 times daily (with meals)  Dispense: 180 tablet; Refill: 3  - Hemoglobin A1c; Future  - Albumin Random Urine Quantitative with Creat Ratio; Future  -FOOT EXAM    3. Hypertension, unspecified type  Uncontrolled.  Change losartan to irbesartan.  Increase amlodipine.  Continue other medications.  Blood pressure recheck in 3-4 weeks through pharmacy  - irbesartan (AVAPRO) 300 MG tablet; Take 1 tablet (300 mg) by mouth At Bedtime  Dispense: 90 tablet; Refill: 3  - Comprehensive metabolic panel; Future  - amLODIPine (NORVASC) 10 MG tablet; Take 1 tablet (10 mg) by mouth daily  Dispense: 90 tablet; Refill: 3  - atenolol (TENORMIN) 50 MG tablet; Take 1 tablet (50 mg) by mouth daily  Dispense: 90 tablet; Refill: 3  - hydrochlorothiazide (HYDRODIURIL) 25 MG tablet; Take 1 tablet (25 mg) by mouth every morning  Dispense: 90 tablet; Refill: 3    4. Chronic gout without tophus, unspecified cause, unspecified site  Denies recent symptoms.  Continue albuterol.  Future labs as ordered  - Comprehensive metabolic panel; Future  - CBC with platelets; Future  -  "Uric acid; Future  - allopurinol (ZYLOPRIM) 300 MG tablet; Take 1 tablet (300 mg) by mouth daily  Dispense: 90 tablet; Refill: 3    5. Hyperlipidemia LDL goal <100  Controlled.  Continue statin daily.  Future labs ordered  - Comprehensive metabolic panel; Future  - atorvastatin (LIPITOR) 20 MG tablet; Take 1 tablet (20 mg) by mouth daily  Dispense: 90 tablet; Refill: 3  - Lipid panel reflex to direct LDL Fasting; Future    6. Hypothyroidism, unspecified type  Controlled.  Continue levothyroxine.  Future labs ordered  - levothyroxine (SYNTHROID/LEVOTHROID) 88 MCG tablet; Take 1 tablet (88 mcg) by mouth daily  Dispense: 90 tablet; Refill: 3  - TSH with free T4 reflex; Future    7. Hepatitis  Mild hepatitis.  Possible steatohepatitis versus statin versus other.  Rare alcohol use.  Continue current medications for now, reduce carbohydrate intake and repeat lab 3 months  - Comprehensive metabolic panel; Future      Patient has been advised of split billing requirements and indicates understanding: Yes      COUNSELING:  Reviewed preventive health counseling, as reflected in patient instructions      BMI:   Estimated body mass index is 29.55 kg/m  as calculated from the following:    Height as of 4/6/23: 1.676 m (5' 6\").    Weight as of 4/6/23: 83.1 kg (183 lb 1.6 oz).         She reports that she has never smoked. She has never used smokeless tobacco.      Appropriate preventive services were discussed with this patient, including applicable screening as appropriate for cardiovascular disease, diabetes, osteopenia/osteoporosis, and glaucoma.  As appropriate for age/gender, discussed screening for colorectal cancer, prostate cancer, breast cancer, and cervical cancer. Checklist reviewing preventive services available has been given to the patient.    Reviewed patients plan of care and provided an AVS. The Basic Care Plan (routine screening as documented in Health Maintenance) for Jenna meets the Care Plan requirement. " This Care Plan has been established and reviewed with the Patient.       PLAN:   Stop Losartan   Start Irbesartan 300mg tab, 1 tab daily for blood pressure   Increase Amlodipine 5mg, 2 tabs (10mg) daily until runout. Then change to 10mg tab, 1  tab daily   Continue other medications  Call  [phone] or use [employer] to schedule a future lab appointment  non-fasting in 3 months.   Covid  booster  and influenza/flu vaccinations in the  Fall (October).  May get through a pharmacy or at clinic  Get a blood pressure recheck   in  3-4 weeks at the  [facility]  or [place] pharmacy. You will need to go to [email or web address] to schedule the blood pressure check appointment  Reduce carbohydrate (sugars, starchy foods such as bread, rice, potato, pasta, etc) intake  in your diet and increase the amount of color on your plate with fruits, vegetables and lean meats.  Increase frequency of walking or other exercise   Pt was informed regarding extra E&M billing for management of new or established medical issues not related to today's wellness/screening visit    Wilmar Boyer MD  [facility]      Identified Health Risks:  I have reviewed Opioid Use Disorder and Substance Use Disorder risk factors and made any needed referrals.   She is at risk for lack of exercise and has been provided with information to increase physical activity for the benefit of her well-being.  The patient was provided with written information regarding signs of hearing loss.  Information on urinary incontinence and treatment options given to patient.

## 2023-08-11 NOTE — PATIENT INSTRUCTIONS
"  Stop Losartan   Start Irbesartan 300mg tab, 1 tab daily for blood pressure   Increase Amlodipine 5mg, 2 tabs (10mg) daily until runout. Then change to 10mg tab, 1  tab daily   Continue other medications  Call  [phone] or use Smart Panel to schedule a future lab appointment  non-fasting in 3 months.   Covid  booster  and influenza/flu vaccinations in the  Fall (October).  May get through a pharmacy or at clinic  Get a blood pressure recheck   in  3-4 weeks at the  [facility]  or [facility]. You will need to go to [email or web address] to schedule the blood pressure check appointment  Reduce carbohydrate (sugars, starchy foods such as bread, rice, potato, pasta, etc) intake  in your diet and increase the amount of color on your plate with fruits, vegetables and lean meats.  Increase frequency of walking or other exercise   Pt was informed regarding extra E&M billing for management of new or established medical issues not related to today's wellness/screening visit        Patient Education   Personalized Prevention Plan  You are due for the preventive services outlined below.  Your care team is available to assist you in scheduling these services.  If you have already completed any of these items, please share that information with your care team to update in your medical record.  Health Maintenance Due   Topic Date Due    Eye Exam  07/09/2022    Diabetic Foot Exam  04/29/2023    ANNUAL REVIEW OF HM ORDERS  04/29/2023    COVID-19 Vaccine (6 - Pfizer series) 05/09/2023     Learning About Being Physically Active  What is physical activity?     Being physically active means doing any kind of activity that gets your body moving.  The types of physical activity that can help you get fit and stay healthy include:  Aerobic or \"cardio\" activities. These make your heart beat faster and make you breathe harder, such as brisk walking, riding a bike, or running. They strengthen your heart and lungs and build " "up your endurance.  Strength training activities. These make your muscles work against, or \"resist,\" something. Examples include lifting weights or doing push-ups. These activities help tone and strengthen your muscles and bones.  Stretches. These let you move your joints and muscles through their full range of motion. Stretching helps you be more flexible.  Reaching a balance between these three types of physical activity is important because each one contributes to your overall fitness.  What are the benefits of being active?  Being active is one of the best things you can do for your health. It helps you to:  Feel stronger and have more energy to do all the things you like to do.  Focus better at school or work.  Feel, think, and sleep better.  Reach and stay at a healthy weight.  Lose fat and build lean muscle.  Lower your risk for serious health problems, including diabetes, heart attack, high blood pressure, and some cancers.  Keep your heart, lungs, bones, muscles, and joints strong and healthy.  How can you make being active part of your life?  Start slowly. Make it your long-term goal to get at least 30 minutes of exercise on most days of the week. Walking is a good choice. You also may want to do other activities, such as running, swimming, cycling, or playing tennis or team sports.  Pick activities that you like--ones that make your heart beat faster, your muscles stronger, and your muscles and joints more flexible. If you find more than one thing you like doing, do them all. You don't have to do the same thing every day.  Get your heart pumping every day. Any activity that makes your heart beat faster and keeps it at that rate for a while counts.  Here are some great ways to get your heart beating faster:  Go for a brisk walk, run, or bike ride.  Go for a hike or swim.  Go in-line skating.  Play a game of touch football, basketball, or soccer.  Ride a bike.  Play tennis or racquetball.  Climb " "stairs.  Even some household chores can be aerobic--just do them at a faster pace. Vacuuming, raking or mowing the lawn, sweeping the garage, and washing and waxing the car all can help get your heart rate up.  Strengthen your muscles during the week. You don't have to lift heavy weights or grow big, bulky muscles to get stronger. Doing a few simple activities that make your muscles work against, or \"resist,\" something can help you get stronger.  For example, you can:  Do push-ups or sit-ups, which use your own body weight as resistance.  Lift weights or dumbbells or use stretch bands at home or in a gym or community center.  Stretch your muscles often. Stretching will help you as you become more active. It can help you stay flexible, loosen tight muscles, and avoid injury. It can also help improve your balance and posture and can be a great way to relax.  Be sure to stretch the muscles you'll be using when you work out. It's best to warm your muscles slightly before you stretch them. Walk or do some other light aerobic activity for a few minutes, and then start stretching.  When you stretch your muscles:  Do it slowly. Stretching is not about going fast or making sudden movements.  Don't push or bounce during a stretch.  Hold each stretch for at least 15 to 30 seconds, if you can. You should feel a stretch in the muscle, but not pain.  Breathe out as you do the stretch. Then breathe in as you hold the stretch. Don't hold your breath.  If you're worried about how more activity might affect your health, have a checkup before you start. Follow any special advice your doctor gives you for getting a smart start.  Where can you learn more?  Go to https://www.[employer].net/patiented  Enter W332 in the search box to learn more about \"Learning About Being Physically Active.\"  Current as of: October 10, 2022               Content Version: 13.7    1298-6232 [employer].   Care instructions adapted under license " by your healthcare professional. If you have questions about a medical condition or this instruction, always ask your healthcare professional. [employer], [employer] disclaims any warranty or liability for your use of this information.      Hearing Loss: Care Instructions  Overview     Hearing loss is a sudden or slow decrease in how well you hear. It can range from slight to profound. Permanent hearing loss can occur with aging. It also can happen when you are exposed long-term to loud noise. Examples include listening to loud music, riding motorcycles, or being around other loud machines.  Hearing loss can affect your work and home life. It can make you feel lonely or depressed. You may feel that you have lost your independence. But hearing aids and other devices can help you hear better and feel connected to others.  Follow-up care is a key part of your treatment and safety. Be sure to make and go to all appointments, and call your doctor if you are having problems. It's also a good idea to know your test results and keep a list of the medicines you take.  How can you care for yourself at home?  Avoid loud noises whenever possible. This helps keep your hearing from getting worse.  Always wear hearing protection around loud noises.  Wear a hearing aid as directed.  A professional can help you pick a hearing aid that will work best for you.  You can also get hearing aids over the counter for mild to moderate hearing loss.  Have hearing tests as your doctor suggests. They can show whether your hearing has changed. Your hearing aid may need to be adjusted.  Use other devices as needed. These may include:  Telephone amplifiers and hearing aids that can connect to a television, stereo, radio, or microphone.  Devices that use lights or vibrations. These alert you to the doorbell, a ringing telephone, or a baby monitor.  Television closed-captioning. This shows the words at the bottom of the screen. Most new TVs can do  "this.  TTY (text telephone). This lets you type messages back and forth on the telephone instead of talking or listening. These devices are also called TDD. When messages are typed on the keyboard, they are sent over the phone line to a receiving TTY. The message is shown on a monitor.  Use text messaging, social media, and email if it is hard for you to communicate by telephone.  Try to learn a listening technique called speechreading. It is not lipreading. You pay attention to people's gestures, expressions, posture, and tone of voice. These clues can help you understand what a person is saying. Face the person you are talking to, and have them face you. Make sure the lighting is good. You need to see the other person's face clearly.  Think about counseling if you need help to adjust to your hearing loss.  When should you call for help?  Watch closely for changes in your health, and be sure to contact your doctor if:    You think your hearing is getting worse.     You have new symptoms, such as dizziness or nausea.   Where can you learn more?  Go to https://www.[employer].net/patiented  Enter R798 in the search box to learn more about \"Hearing Loss: Care Instructions.\"  Current as of: March 1, 2023               Content Version: 13.7    6386-0396 [employer].   Care instructions adapted under license by your healthcare professional. If you have questions about a medical condition or this instruction, always ask your healthcare professional. [employer] disclaims any warranty or liability for your use of this information.      Bladder Training: Care Instructions  Your Care Instructions     Bladder training is used to treat urge incontinence and stress incontinence. Urge incontinence means that the need to urinate comes on so fast that you can't get to a toilet in time. Stress incontinence means that you leak urine because of pressure on your bladder. For example, it may happen when you " laugh, cough, or lift something heavy.  Bladder training can increase how long you can wait before you have to urinate. It can also help your bladder hold more urine. And it can give you better control over the urge to urinate.  It is important to remember that bladder training takes a few weeks to a few months to make a difference. You may not see results right away, but don't give up.  Follow-up care is a key part of your treatment and safety. Be sure to make and go to all appointments, and call your doctor if you are having problems. It's also a good idea to know your test results and keep a list of the medicines you take.  How can you care for yourself at home?  Work with your doctor to come up with a bladder training program that is right for you. You may use one or more of the following methods.  Delayed urination  In the beginning, try to keep from urinating for 5 minutes after you first feel the need to go.  While you wait, take deep, slow breaths to relax. Kegel exercises can also help you delay the need to go to the bathroom.  After some practice, when you can easily wait 5 minutes to urinate, try to wait 10 minutes before you urinate.  Slowly increase the waiting period until you are able to control when you have to urinate.  Scheduled urination  Empty your bladder when you first wake up in the morning.  Schedule times throughout the day when you will urinate.  Start by going to the bathroom every hour, even if you don't need to go.  Slowly increase the time between trips to the bathroom.  When you have found a schedule that works well for you, keep doing it.  If you wake up during the night and have to urinate, do it. Apply your schedule to waking hours only.  Kegel exercises  These tighten and strengthen pelvic muscles, which can help you control the flow of urine. (If doing these exercises causes pain, stop doing them and talk with your doctor.) To do Kegel exercises:  Squeeze your muscles as if you  "were trying not to pass gas. Or squeeze your muscles as if you were stopping the flow of urine. Your belly, legs, and buttocks shouldn't move.  Hold the squeeze for 3 seconds, then relax for 5 to 10 seconds.  Start with 3 seconds, then add 1 second each week until you are able to squeeze for 10 seconds.  Repeat the exercise 10 times a session. Do 3 to 8 sessions a day.  When should you call for help?  Watch closely for changes in your health, and be sure to contact your doctor if:    Your incontinence is getting worse.     You do not get better as expected.   Where can you learn more?  Go to https://www.[employer].net/patiented  Enter V684 in the search box to learn more about \"Bladder Training: Care Instructions.\"  Current as of: March 1, 2023               Content Version: 13.7    9559-7733 [employer].   Care instructions adapted under license by your healthcare professional. If you have questions about a medical condition or this instruction, always ask your healthcare professional. Healthwise, [employer] disclaims any warranty or liability for your use of this information.         "

## 2023-08-12 PROBLEM — R80.9 PROTEINURIA, UNSPECIFIED TYPE: Status: ACTIVE | Noted: 2023-08-12

## 2023-08-12 PROBLEM — K75.9 HEPATITIS: Status: ACTIVE | Noted: 2023-08-12

## 2023-08-12 PROBLEM — I10 HYPERTENSION, UNSPECIFIED TYPE: Status: ACTIVE | Noted: 2023-08-12

## 2023-08-12 RX ORDER — ALLOPURINOL 300 MG/1
1 TABLET ORAL DAILY
Qty: 90 TABLET | Refills: 3 | Status: SHIPPED | OUTPATIENT
Start: 2023-08-12 | End: 2024-08-21

## 2023-08-12 RX ORDER — LEVOTHYROXINE SODIUM 88 UG/1
88 TABLET ORAL DAILY
Qty: 90 TABLET | Refills: 3 | Status: SHIPPED | OUTPATIENT
Start: 2023-08-12 | End: 2024-08-23

## 2023-08-12 RX ORDER — ATORVASTATIN CALCIUM 20 MG/1
20 TABLET, FILM COATED ORAL DAILY
Qty: 90 TABLET | Refills: 3 | Status: SHIPPED | OUTPATIENT
Start: 2023-08-12 | End: 2024-08-21

## 2023-08-12 RX ORDER — AMLODIPINE BESYLATE 10 MG/1
10 TABLET ORAL DAILY
Qty: 90 TABLET | Refills: 3 | Status: SHIPPED | OUTPATIENT
Start: 2023-08-12 | End: 2024-08-21

## 2023-08-12 RX ORDER — HYDROCHLOROTHIAZIDE 25 MG/1
25 TABLET ORAL EVERY MORNING
Qty: 90 TABLET | Refills: 3 | Status: SHIPPED | OUTPATIENT
Start: 2023-08-12 | End: 2024-08-21

## 2023-08-12 RX ORDER — ATENOLOL 50 MG/1
50 TABLET ORAL DAILY
Qty: 90 TABLET | Refills: 3 | Status: SHIPPED | OUTPATIENT
Start: 2023-08-12 | End: 2024-08-21

## 2023-09-19 ENCOUNTER — ALLIED HEALTH/NURSE VISIT (OUTPATIENT)
Dept: INTERNAL MEDICINE | Facility: CLINIC | Age: 68
End: 2023-09-19
Payer: COMMERCIAL

## 2023-09-19 VITALS — SYSTOLIC BLOOD PRESSURE: 148 MMHG | DIASTOLIC BLOOD PRESSURE: 70 MMHG

## 2023-09-19 DIAGNOSIS — Z01.30 BP CHECK: Primary | ICD-10-CM

## 2023-09-19 PROCEDURE — 99207 PR NO CHARGE NURSE ONLY: CPT | Performed by: INTERNAL MEDICINE

## 2023-09-19 NOTE — PROGRESS NOTES
Jenna Card was evaluated at [facility] on September 19, 2023 at which time her blood pressure was:    BP Readings from Last 3 Encounters:   09/19/23 (!) 148/70   08/11/23 (!) 162/70   04/06/23 132/72     Pulse Readings from Last 3 Encounters:   08/11/23 61   04/06/23 70   04/29/22 78       Reviewed lifestyle modifications for blood pressure control and reduction: including making healthy food choices, managing weight, getting regular exercise, smoking cessation, reducing alcohol consumption, monitoring blood pressure regularly.     Symptoms: None    BP Goal:< 140/90 mmHg    BP Assessment:  BP too high    Potential Reasons for BP too high: NA - Not applicable    BP Follow-Up Plan: Recheck BP in 30 days at pharmacy    Recommendation to Provider: Recommended to patient to continue to recheck her blood pressure and see if there is a pattern. Possible increase in medication dose as well may be warranted.     Note completed by:   Thank you,   Deanna Acevedo, PharmD  [facility]

## 2023-09-23 NOTE — PROGRESS NOTES
BP noted. See [facility] message to pt with plan to increase exercise, continue same meds and repeat BP check 1 month

## 2023-09-25 NOTE — TELEPHONE ENCOUNTER
Discussed results with patient at appointment 8/11/2023.  Rare SVT.  Some PVCs..  Was asymptomatic when seen 8/11/2023.  Underwent colonoscopy 7/28/2023.  See report in chart

## 2023-11-04 ENCOUNTER — HEALTH MAINTENANCE LETTER (OUTPATIENT)
Age: 68
End: 2023-11-04

## 2023-12-13 ENCOUNTER — ALLIED HEALTH/NURSE VISIT (OUTPATIENT)
Dept: INTERNAL MEDICINE | Facility: CLINIC | Age: 68
End: 2023-12-13
Payer: COMMERCIAL

## 2023-12-13 VITALS — SYSTOLIC BLOOD PRESSURE: 118 MMHG | DIASTOLIC BLOOD PRESSURE: 66 MMHG

## 2023-12-13 DIAGNOSIS — Z01.30 BP CHECK: Primary | ICD-10-CM

## 2023-12-13 PROCEDURE — 99207 PR NO CHARGE NURSE ONLY: CPT | Performed by: INTERNAL MEDICINE

## 2023-12-13 NOTE — PROGRESS NOTES
Jenna Card was evaluated at [facility] on December 13, 2023 at which time her blood pressure was:    BP Readings from Last 3 Encounters:   12/13/23 118/66   09/19/23 (!) 148/70   08/11/23 (!) 162/70     Pulse Readings from Last 3 Encounters:   08/11/23 61   04/06/23 70   04/29/22 78       Reviewed lifestyle modifications for blood pressure control and reduction: including making healthy food choices, managing weight, getting regular exercise, smoking cessation, reducing alcohol consumption, monitoring blood pressure regularly.     Symptoms: None    BP Goal:< 140/90 mmHg    BP Assessment:  BP at goal    Potential Reasons for BP too high: NA - Not applicable    BP Follow-Up Plan: Recheck BP in 6 months at pharmacy    Recommendation to Provider: n/a    Note completed by:   Thank you,   Deanna Acevedo, PharmD  [facility] [place]

## 2023-12-14 ENCOUNTER — LAB (OUTPATIENT)
Dept: LAB | Facility: CLINIC | Age: 68
End: 2023-12-14
Payer: COMMERCIAL

## 2023-12-14 DIAGNOSIS — I10 HYPERTENSION, UNSPECIFIED TYPE: ICD-10-CM

## 2023-12-14 DIAGNOSIS — E78.5 HYPERLIPIDEMIA LDL GOAL <100: ICD-10-CM

## 2023-12-14 DIAGNOSIS — E11.9 TYPE 2 DIABETES MELLITUS WITHOUT COMPLICATION, WITHOUT LONG-TERM CURRENT USE OF INSULIN (H): ICD-10-CM

## 2023-12-14 DIAGNOSIS — M1A.9XX0 CHRONIC GOUT WITHOUT TOPHUS, UNSPECIFIED CAUSE, UNSPECIFIED SITE: ICD-10-CM

## 2023-12-14 DIAGNOSIS — K75.9 HEPATITIS: ICD-10-CM

## 2023-12-14 LAB
ALBUMIN SERPL BCG-MCNC: 4.4 G/DL (ref 3.5–5.2)
ALP SERPL-CCNC: 61 U/L (ref 40–150)
ALT SERPL W P-5'-P-CCNC: 30 U/L (ref 0–50)
ANION GAP SERPL CALCULATED.3IONS-SCNC: 11 MMOL/L (ref 7–15)
AST SERPL W P-5'-P-CCNC: 50 U/L (ref 0–45)
BILIRUB SERPL-MCNC: 0.5 MG/DL
BUN SERPL-MCNC: 24.3 MG/DL (ref 8–23)
CALCIUM SERPL-MCNC: 9.5 MG/DL (ref 8.8–10.2)
CHLORIDE SERPL-SCNC: 101 MMOL/L (ref 98–107)
CREAT SERPL-MCNC: 0.84 MG/DL (ref 0.51–0.95)
DEPRECATED HCO3 PLAS-SCNC: 25 MMOL/L (ref 22–29)
EGFRCR SERPLBLD CKD-EPI 2021: 74 ML/MIN/1.73M2
ERYTHROCYTE [DISTWIDTH] IN BLOOD BY AUTOMATED COUNT: 13.1 % (ref 10–15)
GLUCOSE SERPL-MCNC: 119 MG/DL (ref 70–99)
HBA1C MFR BLD: 6.5 % (ref 0–5.6)
HCT VFR BLD AUTO: 38.4 % (ref 35–47)
HGB BLD-MCNC: 12.6 G/DL (ref 11.7–15.7)
MCH RBC QN AUTO: 29.4 PG (ref 26.5–33)
MCHC RBC AUTO-ENTMCNC: 32.8 G/DL (ref 31.5–36.5)
MCV RBC AUTO: 90 FL (ref 78–100)
PLATELET # BLD AUTO: 339 10E3/UL (ref 150–450)
POTASSIUM SERPL-SCNC: 4.3 MMOL/L (ref 3.4–5.3)
PROT SERPL-MCNC: 7.2 G/DL (ref 6.4–8.3)
RBC # BLD AUTO: 4.28 10E6/UL (ref 3.8–5.2)
SODIUM SERPL-SCNC: 137 MMOL/L (ref 135–145)
URATE SERPL-MCNC: 4.7 MG/DL (ref 2.4–5.7)
WBC # BLD AUTO: 9.1 10E3/UL (ref 4–11)

## 2023-12-14 PROCEDURE — 83036 HEMOGLOBIN GLYCOSYLATED A1C: CPT

## 2023-12-14 PROCEDURE — 85027 COMPLETE CBC AUTOMATED: CPT

## 2023-12-14 PROCEDURE — 84550 ASSAY OF BLOOD/URIC ACID: CPT

## 2023-12-14 PROCEDURE — 36415 COLL VENOUS BLD VENIPUNCTURE: CPT

## 2023-12-14 PROCEDURE — 80053 COMPREHEN METABOLIC PANEL: CPT

## 2024-07-03 ENCOUNTER — TRANSFERRED RECORDS (OUTPATIENT)
Dept: HEALTH INFORMATION MANAGEMENT | Facility: CLINIC | Age: 69
End: 2024-07-03

## 2024-07-11 DIAGNOSIS — E11.9 TYPE 2 DIABETES MELLITUS WITHOUT COMPLICATION, WITHOUT LONG-TERM CURRENT USE OF INSULIN (H): ICD-10-CM

## 2024-07-11 RX ORDER — BLOOD SUGAR DIAGNOSTIC
STRIP MISCELLANEOUS
Qty: 100 STRIP | Refills: 0 | Status: SHIPPED | OUTPATIENT
Start: 2024-07-11

## 2024-07-11 NOTE — TELEPHONE ENCOUNTER
Medication filled one time only as pt is due for a follow-up for further refills.  Pharmacy has been notified to inform patient to call clinic and schedule appointment.    Cleopatra Boyd RN

## 2024-07-31 DIAGNOSIS — I10 HYPERTENSION, UNSPECIFIED TYPE: ICD-10-CM

## 2024-07-31 RX ORDER — IRBESARTAN 300 MG/1
300 TABLET ORAL AT BEDTIME
Qty: 90 TABLET | Refills: 0 | Status: SHIPPED | OUTPATIENT
Start: 2024-07-31

## 2024-08-01 ENCOUNTER — LAB (OUTPATIENT)
Dept: LAB | Facility: CLINIC | Age: 69
End: 2024-08-01
Payer: COMMERCIAL

## 2024-08-01 DIAGNOSIS — E03.9 HYPOTHYROIDISM, UNSPECIFIED TYPE: ICD-10-CM

## 2024-08-01 DIAGNOSIS — E78.5 HYPERLIPIDEMIA LDL GOAL <100: ICD-10-CM

## 2024-08-01 DIAGNOSIS — E11.9 TYPE 2 DIABETES MELLITUS WITHOUT COMPLICATION, WITHOUT LONG-TERM CURRENT USE OF INSULIN (H): ICD-10-CM

## 2024-08-01 DIAGNOSIS — R80.9 PROTEINURIA, UNSPECIFIED TYPE: ICD-10-CM

## 2024-08-01 LAB
CHOLEST SERPL-MCNC: 127 MG/DL
CREAT UR-MCNC: 77.6 MG/DL
FASTING STATUS PATIENT QL REPORTED: NO
HBA1C MFR BLD: 6.6 % (ref 0–5.6)
HDLC SERPL-MCNC: 40 MG/DL
LDLC SERPL CALC-MCNC: 55 MG/DL
MICROALBUMIN UR-MCNC: <12 MG/L
MICROALBUMIN/CREAT UR: NORMAL MG/G{CREAT}
NONHDLC SERPL-MCNC: 87 MG/DL
T4 FREE SERPL-MCNC: 1.67 NG/DL (ref 0.9–1.7)
TRIGL SERPL-MCNC: 162 MG/DL
TSH SERPL DL<=0.005 MIU/L-ACNC: 4.23 UIU/ML (ref 0.3–4.2)

## 2024-08-01 PROCEDURE — 80061 LIPID PANEL: CPT

## 2024-08-01 PROCEDURE — 84439 ASSAY OF FREE THYROXINE: CPT

## 2024-08-01 PROCEDURE — 82570 ASSAY OF URINE CREATININE: CPT

## 2024-08-01 PROCEDURE — 36415 COLL VENOUS BLD VENIPUNCTURE: CPT

## 2024-08-01 PROCEDURE — 84443 ASSAY THYROID STIM HORMONE: CPT

## 2024-08-01 PROCEDURE — 83036 HEMOGLOBIN GLYCOSYLATED A1C: CPT

## 2024-08-01 PROCEDURE — 82043 UR ALBUMIN QUANTITATIVE: CPT

## 2024-08-21 DIAGNOSIS — M1A.9XX0 CHRONIC GOUT WITHOUT TOPHUS, UNSPECIFIED CAUSE, UNSPECIFIED SITE: ICD-10-CM

## 2024-08-21 DIAGNOSIS — E03.9 HYPOTHYROIDISM, UNSPECIFIED TYPE: ICD-10-CM

## 2024-08-21 DIAGNOSIS — E11.9 TYPE 2 DIABETES MELLITUS WITHOUT COMPLICATION, WITHOUT LONG-TERM CURRENT USE OF INSULIN (H): ICD-10-CM

## 2024-08-21 DIAGNOSIS — E78.5 HYPERLIPIDEMIA LDL GOAL <100: ICD-10-CM

## 2024-08-21 DIAGNOSIS — I10 HYPERTENSION, UNSPECIFIED TYPE: ICD-10-CM

## 2024-08-21 RX ORDER — HYDROCHLOROTHIAZIDE 25 MG/1
25 TABLET ORAL EVERY MORNING
Qty: 90 TABLET | Refills: 0 | Status: SHIPPED | OUTPATIENT
Start: 2024-08-21

## 2024-08-21 RX ORDER — ATENOLOL 50 MG/1
50 TABLET ORAL DAILY
Qty: 90 TABLET | Refills: 0 | Status: SHIPPED | OUTPATIENT
Start: 2024-08-21

## 2024-08-21 RX ORDER — ATORVASTATIN CALCIUM 20 MG/1
20 TABLET, FILM COATED ORAL DAILY
Qty: 90 TABLET | Refills: 0 | Status: SHIPPED | OUTPATIENT
Start: 2024-08-21

## 2024-08-21 RX ORDER — AMLODIPINE BESYLATE 10 MG/1
10 TABLET ORAL DAILY
Qty: 90 TABLET | Refills: 0 | Status: SHIPPED | OUTPATIENT
Start: 2024-08-21

## 2024-08-21 RX ORDER — ALLOPURINOL 300 MG/1
1 TABLET ORAL DAILY
Qty: 90 TABLET | Refills: 0 | Status: SHIPPED | OUTPATIENT
Start: 2024-08-21

## 2024-08-23 RX ORDER — LEVOTHYROXINE SODIUM 88 UG/1
88 TABLET ORAL DAILY
Qty: 90 TABLET | Refills: 0 | Status: SHIPPED | OUTPATIENT
Start: 2024-08-23

## 2024-08-23 NOTE — TELEPHONE ENCOUNTER
Has appt in November. TSH minimally elevated. Will continue same dose for now and discuss with pt in November  level of energy, compliance with med use, etc and make decision at that time whether to increase dosage

## 2024-09-27 ENCOUNTER — TRANSFERRED RECORDS (OUTPATIENT)
Dept: HEALTH INFORMATION MANAGEMENT | Facility: CLINIC | Age: 69
End: 2024-09-27
Payer: COMMERCIAL

## 2024-09-27 LAB — RETINOPATHY: NEGATIVE

## 2024-10-14 ENCOUNTER — ALLIED HEALTH/NURSE VISIT (OUTPATIENT)
Dept: INTERNAL MEDICINE | Facility: CLINIC | Age: 69
End: 2024-10-14
Payer: COMMERCIAL

## 2024-10-14 VITALS — DIASTOLIC BLOOD PRESSURE: 78 MMHG | SYSTOLIC BLOOD PRESSURE: 151 MMHG

## 2024-10-14 DIAGNOSIS — I10 ESSENTIAL HYPERTENSION: Primary | ICD-10-CM

## 2024-10-14 PROCEDURE — 99207 PR NO CHARGE NURSE ONLY: CPT | Performed by: INTERNAL MEDICINE

## 2024-10-14 NOTE — PROGRESS NOTES
Jenna Card was evaluated at [facility] on October 14, 2024 at which time her blood pressure was:    BP Readings from Last 3 Encounters:   10/14/24 (!) 151/78   12/13/23 118/66   09/19/23 (!) 148/70     Pulse Readings from Last 3 Encounters:   08/11/23 61   04/06/23 70   04/29/22 78       Reviewed lifestyle modifications for blood pressure control and reduction: including making healthy food choices, managing weight, getting regular exercise, smoking cessation, reducing alcohol consumption, monitoring blood pressure regularly.     Symptoms: None    BP Goal:< 140/90 mmHg    BP Assessment:  BP too high    Potential Reasons for BP too high: Unknown/Other: patient states she gets whitecoat hypertension, took bp med 1/2 hour ago    BP Follow-Up Plan: Recheck BP in 30 days in the pharmacy.Date of next scheduled BP visit or call: .joan    Recommendation to Provider: bp check in 1 month     Note completed by:Payton Little, PharmD, [facility]  Pharmacist in Charge/  [facility]  [phone]

## 2024-10-15 NOTE — CONFIDENTIAL NOTE
Pharmacy note reviewed. Pt scheduled to see me 11/20/24 and will recheck blood pressure at that time

## 2024-10-25 DIAGNOSIS — I10 HYPERTENSION, UNSPECIFIED TYPE: ICD-10-CM

## 2024-10-26 RX ORDER — IRBESARTAN 300 MG/1
300 TABLET ORAL AT BEDTIME
Qty: 90 TABLET | Refills: 0 | Status: SHIPPED | OUTPATIENT
Start: 2024-10-26

## 2024-11-17 DIAGNOSIS — E03.9 HYPOTHYROIDISM, UNSPECIFIED TYPE: ICD-10-CM

## 2024-11-17 DIAGNOSIS — M1A.9XX0 CHRONIC GOUT WITHOUT TOPHUS, UNSPECIFIED CAUSE, UNSPECIFIED SITE: ICD-10-CM

## 2024-11-17 DIAGNOSIS — I10 HYPERTENSION, UNSPECIFIED TYPE: ICD-10-CM

## 2024-11-17 DIAGNOSIS — E78.5 HYPERLIPIDEMIA LDL GOAL <100: ICD-10-CM

## 2024-11-17 DIAGNOSIS — E11.9 TYPE 2 DIABETES MELLITUS WITHOUT COMPLICATION, WITHOUT LONG-TERM CURRENT USE OF INSULIN (H): ICD-10-CM

## 2024-11-18 RX ORDER — ALLOPURINOL 300 MG/1
1 TABLET ORAL DAILY
Qty: 90 TABLET | Refills: 0 | Status: SHIPPED | OUTPATIENT
Start: 2024-11-18 | End: 2024-11-20

## 2024-11-18 SDOH — HEALTH STABILITY: PHYSICAL HEALTH: ON AVERAGE, HOW MANY DAYS PER WEEK DO YOU ENGAGE IN MODERATE TO STRENUOUS EXERCISE (LIKE A BRISK WALK)?: 4 DAYS

## 2024-11-18 SDOH — HEALTH STABILITY: PHYSICAL HEALTH: ON AVERAGE, HOW MANY MINUTES DO YOU ENGAGE IN EXERCISE AT THIS LEVEL?: 150+ MIN

## 2024-11-18 ASSESSMENT — SOCIAL DETERMINANTS OF HEALTH (SDOH): HOW OFTEN DO YOU GET TOGETHER WITH FRIENDS OR RELATIVES?: ONCE A WEEK

## 2024-11-20 ENCOUNTER — OFFICE VISIT (OUTPATIENT)
Dept: INTERNAL MEDICINE | Facility: CLINIC | Age: 69
End: 2024-11-20
Payer: COMMERCIAL

## 2024-11-20 VITALS
TEMPERATURE: 97.6 F | HEART RATE: 67 BPM | DIASTOLIC BLOOD PRESSURE: 62 MMHG | HEIGHT: 66 IN | OXYGEN SATURATION: 99 % | BODY MASS INDEX: 27.4 KG/M2 | SYSTOLIC BLOOD PRESSURE: 128 MMHG | WEIGHT: 170.5 LBS

## 2024-11-20 DIAGNOSIS — M1A.9XX0 CHRONIC GOUT WITHOUT TOPHUS, UNSPECIFIED CAUSE, UNSPECIFIED SITE: ICD-10-CM

## 2024-11-20 DIAGNOSIS — G56.01 CARPAL TUNNEL SYNDROME OF RIGHT WRIST: ICD-10-CM

## 2024-11-20 DIAGNOSIS — I10 HYPERTENSION, UNSPECIFIED TYPE: ICD-10-CM

## 2024-11-20 DIAGNOSIS — E78.5 HYPERLIPIDEMIA LDL GOAL <100: ICD-10-CM

## 2024-11-20 DIAGNOSIS — Z00.01 ENCOUNTER FOR ROUTINE ADULT MEDICAL EXAM WITH ABNORMAL FINDINGS: Primary | ICD-10-CM

## 2024-11-20 DIAGNOSIS — E11.9 TYPE 2 DIABETES MELLITUS WITHOUT COMPLICATION, WITHOUT LONG-TERM CURRENT USE OF INSULIN (H): ICD-10-CM

## 2024-11-20 PROBLEM — K75.9 HEPATITIS: Status: RESOLVED | Noted: 2023-08-12 | Resolved: 2024-11-20

## 2024-11-20 RX ORDER — BLOOD SUGAR DIAGNOSTIC
STRIP MISCELLANEOUS
Qty: 100 STRIP | Refills: 3 | Status: SHIPPED | OUTPATIENT
Start: 2024-11-20

## 2024-11-20 RX ORDER — ATENOLOL 50 MG/1
50 TABLET ORAL DAILY
Qty: 90 TABLET | Refills: 3 | Status: SHIPPED | OUTPATIENT
Start: 2024-11-20

## 2024-11-20 RX ORDER — IRBESARTAN 300 MG/1
300 TABLET ORAL AT BEDTIME
Qty: 90 TABLET | Refills: 3 | Status: SHIPPED | OUTPATIENT
Start: 2024-11-20

## 2024-11-20 RX ORDER — AMLODIPINE BESYLATE 10 MG/1
10 TABLET ORAL DAILY
Qty: 90 TABLET | Refills: 3 | Status: SHIPPED | OUTPATIENT
Start: 2024-11-20

## 2024-11-20 RX ORDER — ATORVASTATIN CALCIUM 20 MG/1
20 TABLET, FILM COATED ORAL DAILY
Qty: 90 TABLET | Refills: 3 | Status: SHIPPED | OUTPATIENT
Start: 2024-11-20

## 2024-11-20 RX ORDER — AMLODIPINE BESYLATE 10 MG/1
10 TABLET ORAL DAILY
Qty: 90 TABLET | Refills: 0 | OUTPATIENT
Start: 2024-11-20

## 2024-11-20 RX ORDER — ATENOLOL 50 MG/1
50 TABLET ORAL DAILY
Qty: 90 TABLET | Refills: 0 | OUTPATIENT
Start: 2024-11-20

## 2024-11-20 RX ORDER — ALLOPURINOL 300 MG/1
1 TABLET ORAL DAILY
Qty: 90 TABLET | Refills: 3 | Status: SHIPPED | OUTPATIENT
Start: 2024-11-20

## 2024-11-20 RX ORDER — HYDROCHLOROTHIAZIDE 12.5 MG/1
12.5 TABLET ORAL DAILY
Qty: 90 TABLET | Refills: 3 | Status: SHIPPED | OUTPATIENT
Start: 2024-11-20

## 2024-11-20 RX ORDER — ATORVASTATIN CALCIUM 20 MG/1
20 TABLET, FILM COATED ORAL DAILY
Qty: 90 TABLET | Refills: 0 | OUTPATIENT
Start: 2024-11-20

## 2024-11-20 RX ORDER — LANCETS
EACH MISCELLANEOUS
Qty: 100 EACH | Refills: 3 | Status: SHIPPED | OUTPATIENT
Start: 2024-11-20

## 2024-11-20 NOTE — PROGRESS NOTES
Preventive Care Visit  [facility]  Wilmar Boyer MD, Internal Medicine  Nov 20, 2024       ASSESSMENT:    1. Encounter for routine adult medical exam with abnormal findings (Primary)  Will track down the mammogram report from GYN office.  Other healthcare maintenance up-to-date for age. DEXA. Eye exam. Colonoscopy UTD    2. Hypertension, unspecified type  Controlled but gets occasional lightheadedness.  Reduce hydrochlorothiazide to 12.5 mg daily.  Continue other medications.  Future labs ordered  - irbesartan (AVAPRO) 300 MG tablet; Take 1 tablet (300 mg) by mouth at bedtime.  Dispense: 90 tablet; Refill: 3  - Albumin Random Urine Quantitative with Creat Ratio; Future  - Comprehensive metabolic panel; Future  - amLODIPine (NORVASC) 10 MG tablet; Take 1 tablet (10 mg) by mouth daily.  Dispense: 90 tablet; Refill: 3  - atenolol (TENORMIN) 50 MG tablet; Take 1 tablet (50 mg) by mouth daily.  Dispense: 90 tablet; Refill: 3  - hydroCHLOROthiazide 12.5 MG tablet; Take 1 tablet (12.5 mg) by mouth daily.  Dispense: 90 tablet; Refill: 3    3. Type 2 diabetes mellitus without complication, without long-term current use of insulin (H)  Controlled.  Continue metformin, diet, exercise.  Repeat labs in 3 months  - blood glucose (ONETOUCH VERIO IQ) test strip; USE TO TEST BLOOD SUGAR 1 TIME DAILY OR AS DIRECTED.  Dispense: 100 strip; Refill: 3  - blood glucose monitoring (SOFTCLIX) lancets; Use to test blood sugar 1 times daily or as directed.  Dispense: 100 each; Refill: 3  - metFORMIN (GLUCOPHAGE) 1000 MG tablet; Take 1 tablet (1,000 mg) by mouth 2 times daily (with meals).  Dispense: 180 tablet; Refill: 3  - Hemoglobin A1c; Future  - Albumin Random Urine Quantitative with Creat Ratio; Future  - Comprehensive metabolic panel; Future    4. Chronic gout without tophus, unspecified cause, unspecified site  Controlled.  Denies recent gout flares.  Repeat lab 3 months.  Continue allopurinol  - allopurinol  (ZYLOPRIM) 300 MG tablet; Take 1 tablet (300 mg) by mouth daily.  Dispense: 90 tablet; Refill: 3  - Uric acid; Future  - CBC with platelets; Future    5. Hyperlipidemia LDL goal <100  Controlled except for minimal triglyceride elevation.  Continue current medication with statin.  Repeat lab 3 months  - Lipid panel reflex to direct LDL Fasting; Future  - Comprehensive metabolic panel; Future  - atorvastatin (LIPITOR) 20 MG tablet; Take 1 tablet (20 mg) by mouth daily.  Dispense: 90 tablet; Refill: 3    6. Carpal tunnel syndrome of right wrist  Mild symptoms.  Response to wrist brace.  Symptoms are always present.  Patient to evaluate continue conservative care for see hand surgeon at [facility] to discuss surgical management        PLAN:  Change hydrochlorothiazide to 12.5 mg tablet, 1 tablet daily for blood pressure control  Continue other medications.  Refills sent to your [facility] pharmacy   Will track down your mammogram report from  [facility] done sumner 2024  Call  [phone] or use [employer] to schedule a future lab appointment  fasting in 3 months.   For fasting labs, please refrain from eating for 8 hours or more.   Drink 2 glasses of water before your lab appointment. It is fine to take your  oral medications on the morning of the lab test as usual  Continue diet and exercise  Monitor carpal tunnel symptoms.  Wrist brace as needed. If worsens, then see Ortho hand Surgeon  ( Noelle Henderson at [facility])   Healthcare  Directive discussed and given copy.   Patient to complete and return to clinic  Schedule annual wellness  exam with me now for November 2025. See me earlier as needed  Pt was informed regarding extra E&M billing for management of new or established medical issues not related to today's wellness/screening visit           Анна Hsu is a 69 year old, presenting for the following:  Wellness Visit   And follow-up medical issues as above  Patient states she is not under Medicare at this  time      HPI     Health Care Directive  Patient does not have a Health Care Directive: Discussed advance care planning with patient; information given to patient to review.      11/18/2024   General Health   How would you rate your overall physical health? (!) FAIR   Feel stress (tense, anxious, or unable to sleep) Not at all            11/18/2024   Nutrition   Diet: Regular (no restrictions)    Diabetic       Multiple values from one day are sorted in reverse-chronological order         11/18/2024   Exercise   Days per week of moderate/strenous exercise 4 days   Average minutes spent exercising at this level 150+ min            11/18/2024   Social Factors   Frequency of gathering with friends or relatives Once a week   Worry food won't last until get money to buy more No   Food not last or not have enough money for food? No   Do you have housing? (Housing is defined as stable permanent housing and does not include staying ouside in a car, in a tent, in an abandoned building, in an overnight shelter, or couch-surfing.) Yes   Are you worried about losing your housing? No   Lack of transportation? No   Unable to get utilities (heat,electricity)? No            11/18/2024   Fall Risk   Fallen 2 or more times in the past year? No     No    Trouble with walking or balance? No     No        Patient-reported    Multiple values from one day are sorted in reverse-chronological order          11/18/2024   Activities of Daily Living- Home Safety   Needs help with the following daily activites None of the above   Safety concerns in the home Throw rugs in the hallway    No grab bars in the bathroom       Multiple values from one day are sorted in reverse-chronological order         11/18/2024   Dental   Dentist two times every year? Yes            11/18/2024   Hearing Screening   Hearing concerns? (!) I NEED TO ASK PEOPLE TO SPEAK UP OR REPEAT THEMSELVES.            11/18/2024   Driving Risk Screening   Patient/family members  have concerns about driving No            11/18/2024   General Alertness/Fatigue Screening   Have you been more tired than usual lately? (!) YES            11/18/2024   Urinary Incontinence Screening   Bothered by leaking urine in past 6 months No            11/18/2024   TB Screening   Were you born outside of the [place]? No            Today's PHQ-2 Score:       11/20/2024     2:10 PM   PHQ-2 ( 1999 [employer])   Q1: Little interest or pleasure in doing things 0    Q2: Feeling down, depressed or hopeless 0    PHQ-2 Score 0    Q1: Little interest or pleasure in doing things Not at all   Q2: Feeling down, depressed or hopeless Not at all   PHQ-2 Score 0       Patient-reported           11/18/2024   Substance Use   Alcohol more than 3/day or more than 7/wk No   Do you have a current opioid prescription? No   How severe/bad is pain from 1 to 10? 1/10   Do you use any other substances recreationally? (!) OTHER        Social History     Tobacco Use    Smoking status: Never    Smokeless tobacco: Never   Substance Use Topics    Alcohol use: Yes     Comment: 1 drink weekly    Drug use: No            6/12/2023   LAST FHS-7 RESULTS   1st degree relative breast or ovarian cancer No   Any relative bilateral breast cancer No   Any male have breast cancer No   Any ONE woman have BOTH breast AND ovarian cancer No   Any woman with breast cancer before 50yrs No   2 or more relatives with breast AND/OR ovarian cancer No   2 or more relatives with breast AND/OR bowel cancer No         Mammogram Screening - Mammogram every 1-2 years updated in Health Maintenance based on mutual decision making    ASCVD Risk   The ASCVD Risk score (Timothy DK, et al., 2019) failed to calculate for the following reasons:    The valid total cholesterol range is 130 to 320 mg/dL      DEXA 2022. Repeat 5 years    Current providers sharing in care for this patient include:  Patient Care Team:  Wilmar Boyer MD as PCP - General (Internal Medicine)  Wilmar Boyer  MD FLORENTINO as Assigned PCP    The following health maintenance items are reviewed in Epic and correct as of today:  Health Maintenance   Topic Date Due    ANNUAL REVIEW OF HM ORDERS  04/29/2023    DIABETIC FOOT EXAM  08/12/2024    BMP  12/14/2024    A1C  02/01/2025    MAMMO SCREENING  06/12/2025    MEDICARE ANNUAL WELLNESS VISIT  07/03/2025    LIPID  08/01/2025    MICROALBUMIN  08/01/2025    TSH W/FREE T4 REFLEX  08/01/2025    EYE EXAM  09/27/2025    FALL RISK ASSESSMENT  11/20/2025    DTAP/TDAP/TD IMMUNIZATION (2 - Td or Tdap) 07/06/2027    COLORECTAL CANCER SCREENING  07/28/2028    ADVANCE CARE PLANNING  08/12/2028    DEXA  07/14/2037    HEPATITIS C SCREENING  Completed    PHQ-2 (once per calendar year)  Completed    INFLUENZA VACCINE  Completed    Pneumococcal Vaccine: 65+ Years  Completed    ZOSTER IMMUNIZATION  Completed    RSV VACCINE  Completed    COVID-19 Vaccine  Completed    HPV IMMUNIZATION  Aged Out    MENINGITIS IMMUNIZATION  Aged Out    RSV MONOCLONAL ANTIBODY  Aged Out     Component      Latest Ref Rng 12/14/2023  11:35 AM 8/1/2024  11:07 AM 8/1/2024  11:15 AM   Sodium      135 - 145 mmol/L 137      Potassium      3.4 - 5.3 mmol/L 4.3      Carbon Dioxide (CO2)      22 - 29 mmol/L 25      Anion Gap      7 - 15 mmol/L 11      Urea Nitrogen      8.0 - 23.0 mg/dL 24.3 (H)      Creatinine      0.51 - 0.95 mg/dL 0.84      GFR Estimate      >60 mL/min/1.73m2 74      Calcium      8.8 - 10.2 mg/dL 9.5      Chloride      98 - 107 mmol/L 101      Glucose      70 - 99 mg/dL 119 (H)      Alkaline Phosphatase      40 - 150 U/L 61      AST      0 - 45 U/L 50 (H)      ALT      0 - 50 U/L 30      Protein Total      6.4 - 8.3 g/dL 7.2      Albumin      3.5 - 5.2 g/dL 4.4      Bilirubin Total      <=1.2 mg/dL 0.5      WBC      4.0 - 11.0 10e3/uL 9.1      RBC Count      3.80 - 5.20 10e6/uL 4.28      Hemoglobin      11.7 - 15.7 g/dL 12.6      Hematocrit      35.0 - 47.0 % 38.4      MCV      78 - 100 fL 90      MCH      26.5 -  33.0 pg 29.4      MCHC      31.5 - 36.5 g/dL 32.8      RDW      10.0 - 15.0 % 13.1      Platelet Count      150 - 450 10e3/uL 339      Cholesterol      <200 mg/dL  127     Triglycerides      <150 mg/dL  162 (H)     HDL Cholesterol      >=50 mg/dL  40 (L)     LDL Cholesterol Calculated      <=100 mg/dL  55     Non HDL Cholesterol      <130 mg/dL  87     Patient Fasting?  No     Creatinine Urine      mg/dL   77.6    Albumin Urine mg/L      mg/L   <12.0    Albumin Urine mg/g Cr   --    Hemoglobin A1C      0.0 - 5.6 % 6.5 (H)  6.6 (H)     Uric Acid      2.4 - 5.7 mg/dL 4.7      TSH      0.30 - 4.20 uIU/mL  4.23 (H)     T4 Free      0.90 - 1.70 ng/dL  1.67           Sugars (AM, supper, bedtime):  107,x,92  94,142  103,x,113  103,x,132  94,x,102  82    Review of Systems  CONSTITUTIONAL: NEGATIVE for fever, chills. Weight down 13 pounds in 18 mos  INTEGUMENTARY/SKIN: NEGATIVE for worrisome rashes, moles or lesions  EYES: NEGATIVE for  irritation. Has bilateral cataracts but not to the point of needing surgery. [facility] exam Sept [facility]24 at [facility]  ENT/MOUTH: NEGATIVE for ear, mouth and throat problems. Occ rhinorrhea that responds to Zyrtec  RESP: NEGATIVE for significant cough or SOB  BREAST: NEGATIVE for masses, tenderness or discharge.  Had mammogram summer 2024 through [facility] no report to review.   CV: NEGATIVE for chest pain, palpitations. Rare sock indent  skin end of the day  distal BLE peripheral edema  GI: NEGATIVE for nausea, abdominal pain, heartburn (except  with rare foods) , or change in bowel habits  : NEGATIVE for frequency, dysuria, or hematuria  MUSCULOSKELETAL:  POSITIVE for bilateral knee  and left hip arthritis pain. Hx osteoarthritis. Followed by [facility] and states x-rays have been done there.  Using occasional topical Voltaren with benefit  NEURO: NEGATIVE for weakness, dizziness. Has tingling right 1-3 fingers. Responds to use of a wrist brace  ENDOCRINE: NEGATIVE for temperature intolerance. Hx  "DM  HEME: NEGATIVE for bleeding problems  PSYCHIATRIC: NEGATIVE for changes in mood or affect     Objective    Exam  /62   Pulse 67   Temp 97.6  F (36.4  C) (Temporal)   Ht 1.676 m (5' 6\")   Wt 77.3 kg (170 lb 8 oz)   SpO2 99%   BMI 27.52 kg/m     Estimated body mass index is 27.52 kg/m  as calculated from the following:    Height as of this encounter: 1.676 m (5' 6\").    Weight as of this encounter: 77.3 kg (170 lb 8 oz).    Physical Exam  General appearance - alert, no distress  Skin - No rashes or lesions.  Head - normocephalic, atraumatic  Eyes - GIRMA, EOMI, fundi exam with nondilated pupils negative.  Ears - External ears normal. Canals clear. TM's normal.  Nose/Sinuses - Nares normal. Septum midline. Mucosa normal. No drainage or sinus tenderness.  Oropharynx - No erythema, no adenopathy, no exudates.  Neck - Supple without adenopathy or thyromegaly. No bruits.  Lungs - Clear to auscultation without wheezes/rhonchi.  Heart - Regular rate and rhythm without murmurs, clicks, or gallops.  Nodes - No supraclavicular, axillary, or inguinal adenopathy palpable.  Breasts - deferred  Abdomen - Abdomen soft, non-tender. BS normal. No masses or hepatosplenomegaly palpable. No bruits.  Extremities -No cyanosis, clubbing or edema.    Musculoskeletal - Spine ROM normal. Muscular strength intact.   Peripheral pulses - radial=4/4, femoral=4/4, posterior tibial=4/4, dorsalis pedis=4/4,  Neuro - Gait normal. Reflexes normal and symmetric. Sensation grossly WNL to LTS except  mild reduced sensation right first second and third fingers.  Genital/Rectal - deferred           11/20/2024   Mini Cog   Clock Draw Score 2 Normal   3 Item Recall 2 objects recalled   Mini Cog Total Score 4               Signed Electronically by: Wilmar Boyer MD     "

## 2024-11-20 NOTE — PATIENT INSTRUCTIONS
Change hydrochlorothiazide to 12.5 mg tablet, 1 tablet daily for blood pressure control  Continue other medications.  Refills sent to your [facility] pharmacy   Will track down [facility] lu[facility]  Call  [phone] or use [employer] to schedule a future lab appointment  fasting in 3 months.   For fasting labs, please refrain from eating for 8 hours or more.   Drink 2 glasses of water before your lab appointment. It is fine to take your  oral medications on the morning of the lab test as usual  Continue diet and exercise  Monitor carpal tunnel symptoms.  Wrist brace as needed. If worsens, then see Ortho hand Surgeon  ( Noelle Henderson at [facility])   Healthcare  Directive discussed and given copy.   Patient to complete and return to clinic  Schedule annual wellness  exam with me now for November 2025. See me earlier as needed  Pt was informed regarding extra E&M billing for management of new or established medical issues not related to today's wellness/screening visit

## 2024-11-21 RX ORDER — HYDROCHLOROTHIAZIDE 25 MG/1
25 TABLET ORAL EVERY MORNING
Qty: 90 TABLET | Refills: 0 | OUTPATIENT
Start: 2024-11-21

## 2024-11-21 RX ORDER — LEVOTHYROXINE SODIUM 88 UG/1
88 TABLET ORAL DAILY
Qty: 90 TABLET | Refills: 1 | Status: SHIPPED | OUTPATIENT
Start: 2024-11-21

## 2024-11-29 ENCOUNTER — TELEPHONE (OUTPATIENT)
Dept: INTERNAL MEDICINE | Facility: CLINIC | Age: 69
End: 2024-11-29
Payer: COMMERCIAL

## 2024-11-29 NOTE — TELEPHONE ENCOUNTER
----- Message from Wilmar Boyer sent at 11/20/2024  3:42 PM CST -----  Regarding: Mammogram report  Please call Mammogram report and get copy of patient's mammogram report done approximately July 2024 faxed to our clinic to be scanned into patient's medical records

## 2025-02-22 ENCOUNTER — HEALTH MAINTENANCE LETTER (OUTPATIENT)
Age: 70
End: 2025-02-22

## 2025-04-12 ENCOUNTER — HEALTH MAINTENANCE LETTER (OUTPATIENT)
Age: 70
End: 2025-04-12

## 2025-06-24 DIAGNOSIS — E03.9 HYPOTHYROIDISM, UNSPECIFIED TYPE: ICD-10-CM

## 2025-06-24 RX ORDER — LEVOTHYROXINE SODIUM 88 UG/1
88 TABLET ORAL DAILY
Qty: 90 TABLET | Refills: 0 | Status: SHIPPED | OUTPATIENT
Start: 2025-06-24

## 2025-06-25 NOTE — TELEPHONE ENCOUNTER
Patient rarely reading [employer].  Due for fasting labs.  Call patient and assist in scheduling a fasting lab appointment in the next few weeks.  Instruct patient to drink water prior to the lab test.  Medication refilled for 3 months

## 2025-07-09 ENCOUNTER — LAB (OUTPATIENT)
Dept: LAB | Facility: CLINIC | Age: 70
End: 2025-07-09
Payer: COMMERCIAL

## 2025-07-09 DIAGNOSIS — E78.5 HYPERLIPIDEMIA LDL GOAL <100: ICD-10-CM

## 2025-07-09 DIAGNOSIS — E11.9 TYPE 2 DIABETES MELLITUS WITHOUT COMPLICATION, WITHOUT LONG-TERM CURRENT USE OF INSULIN (H): ICD-10-CM

## 2025-07-09 DIAGNOSIS — I10 HYPERTENSION, UNSPECIFIED TYPE: ICD-10-CM

## 2025-07-09 DIAGNOSIS — M1A.9XX0 CHRONIC GOUT WITHOUT TOPHUS, UNSPECIFIED CAUSE, UNSPECIFIED SITE: ICD-10-CM

## 2025-07-09 DIAGNOSIS — E03.9 HYPOTHYROIDISM, UNSPECIFIED TYPE: ICD-10-CM

## 2025-07-09 LAB
ALBUMIN SERPL BCG-MCNC: 4.4 G/DL (ref 3.5–5.2)
ALP SERPL-CCNC: 57 U/L (ref 40–150)
ALT SERPL W P-5'-P-CCNC: 34 U/L (ref 0–50)
ANION GAP SERPL CALCULATED.3IONS-SCNC: 11 MMOL/L (ref 7–15)
AST SERPL W P-5'-P-CCNC: 48 U/L (ref 0–45)
BILIRUB SERPL-MCNC: 0.4 MG/DL
BUN SERPL-MCNC: 20 MG/DL (ref 8–23)
CALCIUM SERPL-MCNC: 9.7 MG/DL (ref 8.8–10.4)
CHLORIDE SERPL-SCNC: 102 MMOL/L (ref 98–107)
CHOLEST SERPL-MCNC: 117 MG/DL
CREAT SERPL-MCNC: 0.85 MG/DL (ref 0.51–0.95)
CREAT UR-MCNC: 104 MG/DL
EGFRCR SERPLBLD CKD-EPI 2021: 73 ML/MIN/1.73M2
ERYTHROCYTE [DISTWIDTH] IN BLOOD BY AUTOMATED COUNT: 13.3 % (ref 10–15)
EST. AVERAGE GLUCOSE BLD GHB EST-MCNC: 146 MG/DL
FASTING STATUS PATIENT QL REPORTED: YES
FASTING STATUS PATIENT QL REPORTED: YES
GLUCOSE SERPL-MCNC: 119 MG/DL (ref 70–99)
HBA1C MFR BLD: 6.7 % (ref 0–5.6)
HCO3 SERPL-SCNC: 23 MMOL/L (ref 22–29)
HCT VFR BLD AUTO: 36.2 % (ref 35–47)
HDLC SERPL-MCNC: 41 MG/DL
HGB BLD-MCNC: 12.1 G/DL (ref 11.7–15.7)
LDLC SERPL CALC-MCNC: 47 MG/DL
MCH RBC QN AUTO: 29.6 PG (ref 26.5–33)
MCHC RBC AUTO-ENTMCNC: 33.4 G/DL (ref 31.5–36.5)
MCV RBC AUTO: 89 FL (ref 78–100)
MICROALBUMIN UR-MCNC: 23.2 MG/L
MICROALBUMIN/CREAT UR: 22.31 MG/G CR (ref 0–25)
NONHDLC SERPL-MCNC: 76 MG/DL
PLATELET # BLD AUTO: 320 10E3/UL (ref 150–450)
POTASSIUM SERPL-SCNC: 5 MMOL/L (ref 3.4–5.3)
PROT SERPL-MCNC: 7.1 G/DL (ref 6.4–8.3)
RBC # BLD AUTO: 4.09 10E6/UL (ref 3.8–5.2)
SODIUM SERPL-SCNC: 136 MMOL/L (ref 135–145)
TRIGL SERPL-MCNC: 146 MG/DL
TSH SERPL DL<=0.005 MIU/L-ACNC: 3.1 UIU/ML (ref 0.3–4.2)
URATE SERPL-MCNC: 4.9 MG/DL (ref 2.4–5.7)
WBC # BLD AUTO: 9.3 10E3/UL (ref 4–11)

## 2025-07-09 PROCEDURE — 83036 HEMOGLOBIN GLYCOSYLATED A1C: CPT

## 2025-07-09 PROCEDURE — 80061 LIPID PANEL: CPT

## 2025-07-09 PROCEDURE — 85027 COMPLETE CBC AUTOMATED: CPT

## 2025-07-09 PROCEDURE — 82570 ASSAY OF URINE CREATININE: CPT

## 2025-07-09 PROCEDURE — 82043 UR ALBUMIN QUANTITATIVE: CPT

## 2025-07-09 PROCEDURE — 84443 ASSAY THYROID STIM HORMONE: CPT

## 2025-07-09 PROCEDURE — 80053 COMPREHEN METABOLIC PANEL: CPT

## 2025-07-09 PROCEDURE — 84550 ASSAY OF BLOOD/URIC ACID: CPT

## 2025-07-09 PROCEDURE — 3044F HG A1C LEVEL LT 7.0%: CPT

## 2025-07-09 PROCEDURE — 36415 COLL VENOUS BLD VENIPUNCTURE: CPT

## 2025-07-23 ENCOUNTER — HOSPITAL ENCOUNTER (OUTPATIENT)
Dept: MAMMOGRAPHY | Facility: CLINIC | Age: 70
Discharge: HOME | End: 2025-07-23
Attending: INTERNAL MEDICINE
Payer: COMMERCIAL

## 2025-07-23 DIAGNOSIS — Z12.31 VISIT FOR SCREENING MAMMOGRAM: ICD-10-CM

## 2025-07-23 PROCEDURE — 77063 BREAST TOMOSYNTHESIS BI: CPT

## (undated) DEVICE — ESU PENCIL W/HOLSTER E2350H

## (undated) DEVICE — ENDO POUCH UNIVERSAL RETRIEVAL SYSTEM INZII 5MM CD003

## (undated) DEVICE — TUBING CONMED AIRSEAL SMOKE EVAC INSUFFLATION ASM-EVAC

## (undated) DEVICE — LUBRICATING JELLY 4.25OZ

## (undated) DEVICE — SPONGE PACK VAGINAL 2"X9

## (undated) DEVICE — SU ETHIBOND 2-0 SHDA 30" X563H

## (undated) DEVICE — SU MONOCRYL 4-0 PS-2 18" UND Y496G

## (undated) DEVICE — DAVINCI HOT SHEARS TIP COVER  400180

## (undated) DEVICE — SU VICRYL 0 CT-2 CR 8X18" J727D

## (undated) DEVICE — DAVINCI XI NDL DRIVER MEGA SUTURE CUT 8MM 470309

## (undated) DEVICE — DAVINCI XI DRAPE COLUMN 470341

## (undated) DEVICE — DAVINCI XI SEAL UNIVERSAL 5-8MM 470361

## (undated) DEVICE — SU VICRYL 0 UR-6 27" J603H

## (undated) DEVICE — TUBING SUCTION 12"X1/4" N612

## (undated) DEVICE — BLADE CLIPPER 4406

## (undated) DEVICE — NDL INSUFFLATION 13GA 120MM C2201

## (undated) DEVICE — SYR 10ML FINGER CONTROL W/O NDL 309695

## (undated) DEVICE — LINEN TOWEL PACK X5 5464

## (undated) DEVICE — CATH TRAY FOLEY SURESTEP 16FR WDRAIN BAG STLK LATEX A300316A

## (undated) DEVICE — SU PDS II 3-0 SH 27" Z316H

## (undated) DEVICE — GLOVE PROTEXIS BLUE W/NEU-THERA 7.0  2D73EB70

## (undated) DEVICE — SUCTION IRR STRYKERFLOW II W/TIP 250-070-520

## (undated) DEVICE — ESU GROUND PAD UNIVERSAL W/O CORD

## (undated) DEVICE — SUCTION CANISTER MEDIVAC LINER 3000ML W/LID 65651-530

## (undated) DEVICE — Device

## (undated) DEVICE — SOL WATER IRRIG 1000ML BOTTLE 2F7114

## (undated) DEVICE — SOL NACL 0.9% INJ 1000ML BAG 2B1324X

## (undated) DEVICE — KIT PATIENT POSITIONING PIGAZZI LATEX FREE 40580

## (undated) DEVICE — GOWN IMPERVIOUS SPECIALTY XL/XLONG 39049

## (undated) DEVICE — DAVINCI XI MONOPOLAR SCISSORS HOT SHEARS 8MM 470179

## (undated) DEVICE — GLOVE PROTEXIS W/NEU-THERA 6.5  2D73TE65

## (undated) DEVICE — DAVINCI XI FCP CADIERE 470049

## (undated) DEVICE — DAVINCI XI OBTURATOR BLADELESS 8MM 470359

## (undated) DEVICE — SU VICRYL 2-0 SH 27" J317H

## (undated) DEVICE — SU VICRYL 2-0 CT-2 27" J333H

## (undated) DEVICE — ENDO TROCAR CONMED AIRSEAL BLADELESS 05X120MM IAS5-120LP

## (undated) DEVICE — SOL WATER IRRIG 3000ML BAG 2B7117

## (undated) DEVICE — DAVINCI XI GRASPER ENDOWRIST BIPOLAR LONG 470400

## (undated) DEVICE — PACK DAVINCI GYN SMA15GDFS1

## (undated) DEVICE — DRAPE IOBAN INCISE 23X17" 6650EZ

## (undated) DEVICE — DAVINCI XI DRAPE ARM 470015

## (undated) DEVICE — SU PROLENE 2-0 V-7 36" 8977H

## (undated) DEVICE — TUBING IRRIG TUR Y TYPE 96" LF 6543-01

## (undated) RX ORDER — NEOSTIGMINE METHYLSULFATE 1 MG/ML
VIAL (ML) INJECTION
Status: DISPENSED
Start: 2019-01-23

## (undated) RX ORDER — GLYCOPYRROLATE 0.2 MG/ML
INJECTION, SOLUTION INTRAMUSCULAR; INTRAVENOUS
Status: DISPENSED
Start: 2019-01-23

## (undated) RX ORDER — FENTANYL CITRATE 50 UG/ML
INJECTION, SOLUTION INTRAMUSCULAR; INTRAVENOUS
Status: DISPENSED
Start: 2019-01-23

## (undated) RX ORDER — CEFAZOLIN SODIUM 2 G/100ML
INJECTION, SOLUTION INTRAVENOUS
Status: DISPENSED
Start: 2019-01-23

## (undated) RX ORDER — PROPOFOL 10 MG/ML
INJECTION, EMULSION INTRAVENOUS
Status: DISPENSED
Start: 2019-01-23

## (undated) RX ORDER — HYDROMORPHONE HYDROCHLORIDE 1 MG/ML
INJECTION, SOLUTION INTRAMUSCULAR; INTRAVENOUS; SUBCUTANEOUS
Status: DISPENSED
Start: 2019-01-23

## (undated) RX ORDER — CEFAZOLIN SODIUM 1 G/3ML
INJECTION, POWDER, FOR SOLUTION INTRAMUSCULAR; INTRAVENOUS
Status: DISPENSED
Start: 2019-01-23

## (undated) RX ORDER — BUPIVACAINE HYDROCHLORIDE AND EPINEPHRINE 5; 5 MG/ML; UG/ML
INJECTION, SOLUTION EPIDURAL; INTRACAUDAL; PERINEURAL
Status: DISPENSED
Start: 2019-01-22

## (undated) RX ORDER — ONDANSETRON 2 MG/ML
INJECTION INTRAMUSCULAR; INTRAVENOUS
Status: DISPENSED
Start: 2019-01-23

## (undated) RX ORDER — LIDOCAINE HYDROCHLORIDE 20 MG/ML
INJECTION, SOLUTION EPIDURAL; INFILTRATION; INTRACAUDAL; PERINEURAL
Status: DISPENSED
Start: 2019-01-23

## (undated) RX ORDER — PHENAZOPYRIDINE HYDROCHLORIDE 200 MG/1
TABLET, FILM COATED ORAL
Status: DISPENSED
Start: 2019-01-23

## (undated) RX ORDER — VASOPRESSIN 20 U/ML
INJECTION PARENTERAL
Status: DISPENSED
Start: 2019-01-23

## (undated) RX ORDER — BUPIVACAINE HYDROCHLORIDE AND EPINEPHRINE 5; 5 MG/ML; UG/ML
INJECTION, SOLUTION EPIDURAL; INTRACAUDAL; PERINEURAL
Status: DISPENSED
Start: 2019-01-23

## (undated) RX ORDER — DEXAMETHASONE SODIUM PHOSPHATE 4 MG/ML
INJECTION, SOLUTION INTRA-ARTICULAR; INTRALESIONAL; INTRAMUSCULAR; INTRAVENOUS; SOFT TISSUE
Status: DISPENSED
Start: 2019-01-23

## (undated) RX ORDER — BUPIVACAINE HYDROCHLORIDE 2.5 MG/ML
INJECTION, SOLUTION EPIDURAL; INFILTRATION; INTRACAUDAL
Status: DISPENSED
Start: 2019-01-23